# Patient Record
Sex: MALE | Race: WHITE | NOT HISPANIC OR LATINO | Employment: OTHER | URBAN - METROPOLITAN AREA
[De-identification: names, ages, dates, MRNs, and addresses within clinical notes are randomized per-mention and may not be internally consistent; named-entity substitution may affect disease eponyms.]

---

## 2017-02-24 ENCOUNTER — OFFICE VISIT (OUTPATIENT)
Dept: INTERNAL MEDICINE | Facility: CLINIC | Age: 62
End: 2017-02-24
Payer: COMMERCIAL

## 2017-02-24 VITALS
WEIGHT: 173.5 LBS | OXYGEN SATURATION: 95 % | TEMPERATURE: 98 F | HEART RATE: 79 BPM | HEIGHT: 66 IN | DIASTOLIC BLOOD PRESSURE: 72 MMHG | SYSTOLIC BLOOD PRESSURE: 118 MMHG | BODY MASS INDEX: 27.88 KG/M2

## 2017-02-24 DIAGNOSIS — C61 PROSTATE CANCER (H): ICD-10-CM

## 2017-02-24 DIAGNOSIS — Z00.00 ENCOUNTER FOR ROUTINE ADULT HEALTH EXAMINATION WITHOUT ABNORMAL FINDINGS: Primary | ICD-10-CM

## 2017-02-24 DIAGNOSIS — E78.5 HYPERLIPIDEMIA LDL GOAL <130: ICD-10-CM

## 2017-02-24 DIAGNOSIS — N52.31 ERECTILE DYSFUNCTION FOLLOWING RADICAL PROSTATECTOMY: ICD-10-CM

## 2017-02-24 DIAGNOSIS — D12.6 SERRATED ADENOMA OF COLON: ICD-10-CM

## 2017-02-24 DIAGNOSIS — I10 ESSENTIAL HYPERTENSION, BENIGN: ICD-10-CM

## 2017-02-24 DIAGNOSIS — Z12.5 SPECIAL SCREENING FOR MALIGNANT NEOPLASM OF PROSTATE: ICD-10-CM

## 2017-02-24 LAB
ANION GAP SERPL CALCULATED.3IONS-SCNC: 6 MMOL/L (ref 3–14)
BASOPHILS # BLD AUTO: 0.1 10E9/L (ref 0–0.2)
BASOPHILS NFR BLD AUTO: 1.1 %
BUN SERPL-MCNC: 14 MG/DL (ref 7–30)
CALCIUM SERPL-MCNC: 9.6 MG/DL (ref 8.5–10.1)
CHLORIDE SERPL-SCNC: 104 MMOL/L (ref 94–109)
CHOLEST SERPL-MCNC: 192 MG/DL
CO2 SERPL-SCNC: 31 MMOL/L (ref 20–32)
CREAT SERPL-MCNC: 1.01 MG/DL (ref 0.66–1.25)
DIFFERENTIAL METHOD BLD: ABNORMAL
EOSINOPHIL # BLD AUTO: 0.3 10E9/L (ref 0–0.7)
EOSINOPHIL NFR BLD AUTO: 4.7 %
ERYTHROCYTE [DISTWIDTH] IN BLOOD BY AUTOMATED COUNT: 13.6 % (ref 10–15)
GFR SERPL CREATININE-BSD FRML MDRD: 75 ML/MIN/1.7M2
GLUCOSE SERPL-MCNC: 108 MG/DL (ref 70–99)
HCT VFR BLD AUTO: 47.3 % (ref 40–53)
HDLC SERPL-MCNC: 63 MG/DL
HGB BLD-MCNC: 15.5 G/DL (ref 13.3–17.7)
LDLC SERPL CALC-MCNC: 111 MG/DL
LYMPHOCYTES # BLD AUTO: 2 10E9/L (ref 0.8–5.3)
LYMPHOCYTES NFR BLD AUTO: 32.1 %
MCH RBC QN AUTO: 33.3 PG (ref 26.5–33)
MCHC RBC AUTO-ENTMCNC: 32.8 G/DL (ref 31.5–36.5)
MCV RBC AUTO: 102 FL (ref 78–100)
MONOCYTES # BLD AUTO: 0.6 10E9/L (ref 0–1.3)
MONOCYTES NFR BLD AUTO: 9.4 %
NEUTROPHILS # BLD AUTO: 3.2 10E9/L (ref 1.6–8.3)
NEUTROPHILS NFR BLD AUTO: 52.7 %
NONHDLC SERPL-MCNC: 129 MG/DL
PLATELET # BLD AUTO: 224 10E9/L (ref 150–450)
POTASSIUM SERPL-SCNC: 4.3 MMOL/L (ref 3.4–5.3)
PSA SERPL-ACNC: 0.45 UG/L (ref 0–4)
RBC # BLD AUTO: 4.65 10E12/L (ref 4.4–5.9)
SODIUM SERPL-SCNC: 141 MMOL/L (ref 133–144)
TRIGL SERPL-MCNC: 92 MG/DL
WBC # BLD AUTO: 6.2 10E9/L (ref 4–11)

## 2017-02-24 PROCEDURE — 36415 COLL VENOUS BLD VENIPUNCTURE: CPT | Performed by: INTERNAL MEDICINE

## 2017-02-24 PROCEDURE — 80048 BASIC METABOLIC PNL TOTAL CA: CPT | Performed by: INTERNAL MEDICINE

## 2017-02-24 PROCEDURE — 85025 COMPLETE CBC W/AUTO DIFF WBC: CPT | Performed by: INTERNAL MEDICINE

## 2017-02-24 PROCEDURE — 80061 LIPID PANEL: CPT | Performed by: INTERNAL MEDICINE

## 2017-02-24 PROCEDURE — 99396 PREV VISIT EST AGE 40-64: CPT | Performed by: INTERNAL MEDICINE

## 2017-02-24 PROCEDURE — G0103 PSA SCREENING: HCPCS | Performed by: INTERNAL MEDICINE

## 2017-02-24 RX ORDER — LOSARTAN POTASSIUM AND HYDROCHLOROTHIAZIDE 25; 100 MG/1; MG/1
1 TABLET ORAL EVERY MORNING
Qty: 90 TABLET | Refills: 3 | Status: SHIPPED | OUTPATIENT
Start: 2017-02-24 | End: 2018-02-26

## 2017-02-24 RX ORDER — SILDENAFIL 100 MG/1
50-100 TABLET, FILM COATED ORAL DAILY PRN
Qty: 6 TABLET | Refills: 11 | Status: SHIPPED | OUTPATIENT
Start: 2017-02-24 | End: 2018-02-26

## 2017-02-24 NOTE — PROGRESS NOTES
SUBJECTIVE:                                                    Juancarlos Ma is a 62 year old male who presents to clinic today for the following health issues:        SUBJECTIVE:     CC: Juancarlos Ma is an 62 year old male who presents for preventative health visit.     Healthy Habits:    Do you get at least three servings of calcium containing foods daily (dairy, green leafy vegetables, etc.)? yes    Amount of exercise or daily activities, outside of work:     Problems taking medications regularly No    Medication side effects: No    Have you had an eye exam in the past two years? yes    Do you see a dentist twice per year? yes    Do you have sleep apnea, excessive snoring or daytime drowsiness?no      1.    Blood presure remains well controlled at home  Readings outside clinic are within normal limits.  Reviewed last 6 BP readings in chart:  BP Readings from Last 6 Encounters:   02/24/17 118/72   06/09/16 140/76   02/23/16 134/80   02/03/16 (!) 154/91   11/11/15 120/86   02/23/15 130/80     He has not experienced any significant side effects from medicaiotns for hypertension.    NO active cardiac complaints or symptoms with exercise.       Today's PHQ-2 Score:   PHQ-2 ( 1999 Pfizer) 2/24/2017 2/23/2016   Q1: Little interest or pleasure in doing things 0 0   Q2: Feeling down, depressed or hopeless 0 0   PHQ-2 Score 0 0       Abuse: Current or Past(Physical, Sexual or Emotional)- No  Do you feel safe in your environment - Yes    Social History   Substance Use Topics     Smoking status: Current Some Day Smoker     Packs/day: 0.50     Years: 40.00     Types: Cigarettes     Last attempt to quit: 12/1/2014     Smokeless tobacco: Never Used     Alcohol use 10.0 oz/week     20 Cans of beer per week      Comment: daily     The patient does not drink >3 drinks per day nor >7 drinks per week.    Last PSA:   PSA   Date Value Ref Range Status   02/23/2016 0.37 0 - 4 ug/L Final       Recent Labs   Lab Test  02/23/15   0817   01/22/13   1035   CHOL  184  208*   HDL  66  78   LDL  105  116   TRIG  65  70   CHOLHDLRATIO  2.8  2.7       Reviewed orders with patient. Reviewed health maintenance and updated orders accordingly - Yes    All Histories reviewed and updated in Epic.    Past Medical History:  ---------------------------  Past Medical History   Diagnosis Date     Diverticulosis of colon 10/2/12     incidental sigmoid diverticulosis seen on routine colonoscopy, no h/o diverticulitis     Essential hypertension, benign      Prostate cancer (H)      Serrated adenoma of colon 10/2/12     2 sessile serrated adenomatous polyps removed at colonoscopy; 1 tubular adenoma       Past Surgical History:  ---------------------------  Past Surgical History   Procedure Laterality Date     Hc tooth extraction w/forcep  1974     4 wisdom teeth removed without complication     Davinci prostatectomy  6/10/2013     Procedure: DAVINCI PROSTATECTOMY;  ROBOTIC ASSIST PROSTATECTOMY;  Surgeon: Damon Jarrell MD;  Location: [facility] OR       Current Medications:  ---------------------------  Current Outpatient Prescriptions   Medication Sig Dispense Refill     losartan-hydrochlorothiazide (HYZAAR) 100-25 MG per tablet Take 1 tablet by mouth every morning 90 tablet 3     sildenafil (REVATIO/VIAGRA) 100 MG cap/tab Take 0.5-1 tablets ( mg) by mouth daily as needed for erectile dysfunction Take 30 min to 4 hours before intercourse.  Never use with nitroglycerin, terazosin or doxazosin. 6 tablet 11     ASPIRIN PO Take 81 mg by mouth daily        [DISCONTINUED] losartan-hydrochlorothiazide (HYZAAR) 100-25 MG per tablet Take 1 tablet by mouth every morning 90 tablet 3       Allergies:  -------------  Allergies   Allergen Reactions     Lisinopril      Cough with Lisinopril     Penicillin [Penicillins] Hives       Social History:  -------------------  Social History     Social History     Marital status: Single     Spouse name: N/A     Number of children: N/A  "    Years of education: N/A     Occupational History      [employer]     Social History Main Topics     Smoking status: Current Some Day Smoker     Packs/day: 0.50     Years: 40.00     Types: Cigarettes     Last attempt to quit: 2014     Smokeless tobacco: Never Used     Alcohol use 10.0 oz/week     20 Cans of beer per week      Comment: daily     Drug use: No     Sexual activity: No     Other Topics Concern     Not on file     Social History Narrative       Family Medical History:  ------------------------------  Family History   Problem Relation Age of Onset     Hypertension Father       of cancer      DIABETES Brother      type II DM     CANCER Father      father  lymphoma at age 68     Respiratory Mother       at age 72 from silicosis, COPD, smoking     Family History Negative Sister      Family History Negative Brother         ROS:  C: NEGATIVE for fever, chills, change in weight  I: NEGATIVE for worrisome rashes, moles or lesions  E: NEGATIVE for vision changes or irritation  ENT: NEGATIVE for ear, mouth and throat problems  R: NEGATIVE for significant cough or SOB  CV: NEGATIVE for chest pain, palpitations or peripheral edema  GI: NEGATIVE for nausea, abdominal pain, heartburn, or change in bowel habits   male: negative for dysuria, hematuria, decreased urinary stream, erectile dysfunction, urethral discharge  M: NEGATIVE for significant arthralgias or myalgia  N: NEGATIVE for weakness, dizziness or paresthesias  P: NEGATIVE for changes in mood or affect      OBJECTIVE:     /72  Pulse 79  Temp 98  F (36.7  C) (Oral)  Ht 5' 5.75\" (1.67 m)  Wt 173 lb 8 oz (78.7 kg)  SpO2 95%  BMI 28.22 kg/m2  EXAM:  GENERAL alert and no distress.  EYES conjunctivae/corneas clear. PERRL, EOM's intact  HENT: NCAT,oral and posterior pharynx without lesions or erythema, facies symmetric  NECK: Neck supple. No LAD, without thyroidmegaly or JVD.  RESP: Clear to ausculation bilaterally " without wheezes or crackles. Normal BS in all fields.  CV: RRR normal S1S2 without  murmurs, rubs or gallops. PMI normal  LYMPH: no cervical lymph adenopathy appreciated  GI: NTND, no organomegaly, normal BS in all quadrants, without rebound or guarding  MS: No cyanosis, clubbing or edema noted bilaterally in Upper and/or Lower Extremities  SKIN: no significant ulcers, lesions or rashes on the visualized portions of the skin  NEURO: Alert and Oriented x 3, Gait normal. Reflexes normal and symmetric. Sensation grossly WNL..  PSYCH: Alert and oriented times 3; speech- coherent , normal rate and volume; able to articulate logical thoughts, able to abstract reason, no tangential thoughts, no hallucinations or delusions, affect- normal     ASSESSMENT/PLAN:       (Z00.00) Encounter for routine adult health examination without abnormal findings  (primary encounter diagnosis)  Comment: Discussed cardiac disease risk factor modification including screening for and treating HTN, lipids, DM, and smoking cessation.  Also discussed age appropriate cancer screening recommendations including testicular, prostate, colon and lung cancer as dictated by age group.  Recommended low fat, low salt diet and moderation in any alcohol intake.  Recommended always using seatbelts when in a car.  Recommended never driving after drinking or riding with someone who has been drinking as well.       Plan:     (I10) Essential hypertension, benign  Comment: This condition is currently controlled on the current medical regimen.  Continue current therapy.   Discussed hypertension in detail including JNC VIII guidelines for blood pressure goals.  Discussed indication for treatment and treatment options.  Discussed the importance for aggressive management of HTN to prevent vascular complications later.  Recommended lower fat, lower carbohydrate, and lower sodium (<2000 mg)diet.  Discussed required intervals for follow up on HTN, lab studies, and the  need to aggresive management of other cardiac disease risk factors.  Recommened pt. follow their blood pressures outside the clinic to ensure that BPs are remaining within guidelines, and to contact me if the readings are not within guidelines so we can adjust treatment as needed.   Plan: losartan-hydrochlorothiazide (HYZAAR) 100-25 MG        per tablet, CBC with platelets and         differential, Basic metabolic panel            (E78.5) Hyperlipidemia LDL goal <130  Comment: Discussed new guidelines recommending a statin cholesterol lowering medication for any patient with either diabetes and/or vascular disease, aiming for a LDL goal under 100 for sure, ideally under 70.    Reviewed statins and their side effects including muscle pain, muscle inflammation, GI upset.  Told the patient to stop the medication in question and to call if any side effects develop.   Recommended CoQ10 200-300 mg at the same time as taking the statin medication to help reduce the chance of muscle side effects from the statin.    Plan: Lipid Profile with reflex to direct LDL            (D12.6) Serrated adenoma of colon  Comment: next due 2021  Plan:     (C61) Prostate cancer (H)  Comment: doing well, urologist wants me to check PSA annually, return to urology as indiacted.   Plan:     (N52.31) Erectile dysfunction following radical prostatectomy  Comment: ED since prostataectomy.  This diagnosis could qualify for coverage for Viagra, he would like to try again.   If this does not worek, then he may need to return to urology to discuss other options.   Plan: sildenafil (REVATIO/VIAGRA) 100 MG cap/tab            (Z12.5) Special screening for malignant neoplasm of prostate  Comment:   Plan: PSA, screen               COUNSELING:  Reviewed preventive health counseling, as reflected in patient instructions       Regular exercise       Healthy diet/nutrition       Vision screening       Hearing screening       Aspirin Prophylaxsis       Colon  "cancer screening       Prostate cancer screening         reports that he has been smoking Cigarettes.  He has a 20.00 pack-year smoking history. He has never used smokeless tobacco.    Estimated body mass index is 28.22 kg/(m^2) as calculated from the following:    Height as of this encounter: 5' 5.75\" (1.67 m).    Weight as of this encounter: 173 lb 8 oz (78.7 kg).       Counseling Resources:  ATP IV Guidelines  Pooled Cohorts Equation Calculator  FRAX Risk Assessment  ICSI Preventive Guidelines  Dietary Guidelines for Americans, 2010  USDA's MyPlate  ASA Prophylaxis  Lung CA Screening    Wili Arevalo MD  [facility]  "

## 2017-02-24 NOTE — MR AVS SNAPSHOT
After Visit Summary   2/24/2017    Juancarlos Ma    MRN: [id number]           Patient Information     Date Of Birth          1955        Visit Information        Provider Department      2/24/2017 8:20 AM Wili Arevalo MD [facility]        Today's Diagnoses     Encounter for routine adult health examination without abnormal findings    -  1    Essential hypertension, benign        Hyperlipidemia LDL goal <130        Serrated adenoma of colon        Prostate cancer (H)        Erectile dysfunction following radical prostatectomy          Care Instructions    *  Continue all medications at the same doses.  Contact your usual pharmacy if you need refills.     *  Colonoscopy next due 2021      VIAGRA (SILDENAFIL):     *  Trial of Sildenafil (Viagra) for help with erections.      *  Brand name Viagra is Sildenafil that comes in a 25, 50, or 100 mg size tablet that is manufactured and marketed by the drug company Pfizer.    *  Sildenafil is also available as a generic medication that comes in a 20 or 40 mg tablet.  It is not technically called Viagra, but is the same medication.      *  Visit the Viagra web site (http://www.Dojo/) for more information and also for possible discount cards.      *  Viagra and generic Sildenafil are potentially available at much cheaper prices from some [facility], which can be very important if you are paying for the cost of this medication.  One example is www.RingTu.Transcarga.pe    *  Depending on which version of Sildenafil you have, Take a 20 mg, 40 mg or 50 mg dose (1/2 of 100 mg tablet) 30-60 minutes before sexual intercourse.  If the first time lower dose does not work, then retry the same dose the next time.  If that dose dose not work, then try 100 mg dose.    The medication rarely works the very first time you try it since you are likely to be nervous about the medication itself, but there has to be a first time.   "    *  Beware of possible side effects including dizziness, headaches, colosr vision, upset stomach, nausea, passing out, problems with urination and sustained erections.  Never take the medication with nitroglycerin containing medications.  If you experience any severe side effects stop the medication and do not take it again until you speak with our clinic.     *  There may be an increased rate of side effects from Sildenafil/Viagra when alcohol is used too close to viagra    *  I will prescribe either Sildenafil or Viagra as long as it helps improve erectile dysfunction and does not cause side effects.  Contact me after you finish the first prescription.  If it works well with no major side effects, then I will give you further prescriptions.  If Sildenafil does not work as desired, then we may consider another similar medication (Levitra or Cialis)    *  Contact me after you use the first prescripton and let me know if it works and that there are no side effects.   If it works and there are no side effects, then I will continue it.  Also let me know what form of Sildenafil you would like to continue and where you would like to get the prescriptions.          5 GOALS TO PREVENT VASCULAR DISEASE:     1.  Aggressive blood pressure control, under 130/80 ideally.  Using medications if needed.    Your blood pressure is under good control    BP Readings from Last 4 Encounters:   02/24/17 118/72   06/09/16 140/76   02/23/16 134/80   02/03/16 (!) 154/91       2.  Aggressive LDL cholesterol (\"bad cholesterol\") lowering as indicated.    Your goal is an LDL under 130 for sure, preferably under 100.  (If you have diabetes or previous vascular disease, the the LDL goals would be under 100 for sure, preferably under 70.)    New guidelines identify four high-risk groups who could benefit from statins:   *people with pre-existing heart disease, such as those who have had a heart attack;   *people ages 40 to 75 who have diabetes " "of any type  *patients ages 40 to 75 with at least a 7.5% risk of developing cardiovascular disease over the next decade, according to a formula described in the guidelines  *patients with the sort of super-high cholesterol that sometimes runs in families, as evidenced by an LDL of 190 milligrams per deciliter or higher    Your cholesterol levels are well controlled.    Recent Labs   Lab Test  02/23/15   0831  01/22/13   1035   CHOL  184  208*   HDL  66  78   LDL  105  116   TRIG  65  70   CHOLHDLRATIO  2.8  2.7       3.  Aggressive diabetic prevention, screening and/or management.      You do not have diabetes as of the most recent blood tests.     4.  No smoking    5.  Consider taking low dose aspirin (81 mg) tablet once per day over the age of 50, every day unless there is a specific reason that you cannot take aspirin (such as side effect, allergy, or you are on another \"blood thinner\").        --Based on your current cardiac risk factors, you should take Aspirin 81 mg once per day if you are over 50 years of age.           Preventive Health Recommendations  Male Ages 50 - 64    Yearly exam:             See your health care provider every year in order to  o   Review health changes.   o   Discuss preventive care.    o   Review your medicines if your doctor has prescribed any.     Have a cholesterol test every 5 years, or more frequently if you are at risk for high cholesterol/heart disease.     Have a diabetes test (fasting glucose) every three years. If you are at risk for diabetes, you should have this test more often.     Have a colonoscopy at age 50, or have a yearly FIT test (stool test). These exams will check for colon cancer.      Talk with your health care provider about whether or not a prostate cancer screening test (PSA) is right for you.    You should be tested each year for STDs (sexually transmitted diseases), if you re at risk.     Shots: Get a flu shot each year. Get a tetanus shot every 10 " "years.     Nutrition:    Eat at least 5 servings of fruits and vegetables daily.     Eat whole-grain bread, whole-wheat pasta and brown rice instead of white grains and rice.     Talk to your provider about Calcium and Vitamin D.        --Good Grains:  Oats, brown rice, Quinoa (these do not raise the blood sugar as much)     --Bad grains:  Anything made from wheat or white rice     (because these raise the blood sugars significantly, and the possible gluten issue from wheat for some people).      --Proteins:  Aim for \"lean proteins\" including chicken, fish, seafood, pork, turkey, and eggs (in moderation); Eat red meat only occasionally          Lifestyle    Exercise for at least 150 minutes a week (30 minutes a day, 5 days a week). This will help you control your weight and prevent disease.     Limit alcohol to one drink per day.     No smoking.     Wear sunscreen to prevent skin cancer.     See your dentist every six months for an exam and cleaning.     See your eye doctor every 1 to 2 years.          Follow-ups after your visit        Who to contact     If you have questions or need follow up information about today's clinic visit or your schedule please contact [facility] directly at [phone].  Normal or non-critical lab and imaging results will be communicated to you by [employer], letter or phone within 4 business days after the clinic has received the results. If you do not hear from us within 7 days, please contact the clinic through [employer] or phone. If you have a critical or abnormal lab result, we will notify you by phone as soon as possible.  Submit refill requests through [employer] or call your pharmacy and they will forward the refill request to us. Please allow 3 business days for your refill to be completed.          Additional Information About Your Visit        Badge Information     Badge gives you secure access to your electronic health record. If you see a primary care " "provider, you can also send messages to your care team and make appointments. If you have questions, please call your primary care clinic.  If you do not have a primary care provider, please call [phone] and they will assist you.        Care EveryWhere ID     This is your Care EveryWhere ID. This could be used by other organizations to access your [facility] medical records  [id number]        Your Vitals Were     Pulse Temperature Height Pulse Oximetry BMI (Body Mass Index)       79 98  F (36.7  C) (Oral) 5' 5.75\" (1.67 m) 95% 28.22 kg/m2        Blood Pressure from Last 3 Encounters:   02/24/17 118/72   06/09/16 140/76   02/23/16 134/80    Weight from Last 3 Encounters:   02/24/17 173 lb 8 oz (78.7 kg)   06/09/16 178 lb (80.7 kg)   02/23/16 170 lb 11.2 oz (77.4 kg)              Today, you had the following     No orders found for display         Today's Medication Changes          These changes are accurate as of: 2/24/17  9:11 AM.  If you have any questions, ask your nurse or doctor.               Start taking these medicines.        Dose/Directions    sildenafil 100 MG cap/tab   Commonly known as:  REVATIO/VIAGRA   Used for:  Erectile dysfunction following radical prostatectomy   Started by:  Wili Arevalo MD        Dose:   mg   Take 0.5-1 tablets ( mg) by mouth daily as needed for erectile dysfunction Take 30 min to 4 hours before intercourse.  Never use with nitroglycerin, terazosin or doxazosin.   Quantity:  6 tablet   Refills:  11            Where to get your medicines      These medications were sent to [facility] [address] [place] [address] [address] [facility] [address] [facility]  [address] [facility] [facility] [address]     Phone:  [phone]     losartan-hydrochlorothiazide 100-25 MG per tablet         Some of these will need a paper prescription and others can be bought over the counter.  Ask your nurse if you have questions.     Bring a paper prescription for each of these " medications     sildenafil 100 MG cap/tab                Primary Care Provider Office Phone # Fax #    Wili Arevalo [phone]       [facility] [address] [facility] [address]        Thank you!     Thank you for choosing [facility]  for your care. Our goal is always to provide you with excellent care. Hearing back from our patients is one way we can continue to improve our services. Please take a few minutes to complete the written survey that you may receive in the mail after your visit with us. Thank you!             Your Updated Medication List - Protect others around you: Learn how to safely use, store and throw away your medicines at www.disposemymeds.org.          This list is accurate as of: 2/24/17  9:11 AM.  Always use your most recent med list.                   Brand Name Dispense Instructions for use    ASPIRIN PO      Take 81 mg by mouth daily       losartan-hydrochlorothiazide 100-25 MG per tablet    HYZAAR    90 tablet    Take 1 tablet by mouth every morning       sildenafil 100 MG cap/tab    REVATIO/VIAGRA    6 tablet    Take 0.5-1 tablets ( mg) by mouth daily as needed for erectile dysfunction Take 30 min to 4 hours before intercourse.  Never use with nitroglycerin, terazosin or doxazosin.

## 2017-02-24 NOTE — NURSING NOTE
"Chief Complaint   Patient presents with     Hypertension     med refill       Initial /72  Pulse 79  Temp 98  F (36.7  C) (Oral)  Ht 5' 5.75\" (1.67 m)  Wt 173 lb 8 oz (78.7 kg)  SpO2 95%  BMI 28.22 kg/m2 Estimated body mass index is 28.22 kg/(m^2) as calculated from the following:    Height as of this encounter: 5' 5.75\" (1.67 m).    Weight as of this encounter: 173 lb 8 oz (78.7 kg).  Medication Reconciliation: complete   Nicole Walden CMA    "

## 2017-11-08 ENCOUNTER — TRANSFERRED RECORDS (OUTPATIENT)
Dept: HEALTH INFORMATION MANAGEMENT | Facility: CLINIC | Age: 62
End: 2017-11-08

## 2018-02-07 ENCOUNTER — TRANSFERRED RECORDS (OUTPATIENT)
Dept: HEALTH INFORMATION MANAGEMENT | Facility: CLINIC | Age: 63
End: 2018-02-07

## 2018-02-26 ENCOUNTER — OFFICE VISIT (OUTPATIENT)
Dept: INTERNAL MEDICINE | Facility: CLINIC | Age: 63
End: 2018-02-26
Payer: COMMERCIAL

## 2018-02-26 VITALS
WEIGHT: 173 LBS | RESPIRATION RATE: 17 BRPM | HEART RATE: 83 BPM | SYSTOLIC BLOOD PRESSURE: 116 MMHG | OXYGEN SATURATION: 93 % | TEMPERATURE: 98 F | DIASTOLIC BLOOD PRESSURE: 74 MMHG | HEIGHT: 66 IN | BODY MASS INDEX: 27.8 KG/M2

## 2018-02-26 DIAGNOSIS — Z12.5 SPECIAL SCREENING FOR MALIGNANT NEOPLASM OF PROSTATE: ICD-10-CM

## 2018-02-26 DIAGNOSIS — Z01.818 PREOPERATIVE EXAMINATION: ICD-10-CM

## 2018-02-26 DIAGNOSIS — G56.02 CARPAL TUNNEL SYNDROME OF LEFT WRIST: ICD-10-CM

## 2018-02-26 DIAGNOSIS — E78.5 HYPERLIPIDEMIA LDL GOAL <130: ICD-10-CM

## 2018-02-26 DIAGNOSIS — I10 ESSENTIAL HYPERTENSION, BENIGN: Primary | ICD-10-CM

## 2018-02-26 DIAGNOSIS — C61 PROSTATE CANCER (H): ICD-10-CM

## 2018-02-26 DIAGNOSIS — S62.347D CLOSED NONDISPLACED FRACTURE OF BASE OF FIFTH METACARPAL BONE OF LEFT HAND WITH ROUTINE HEALING, SUBSEQUENT ENCOUNTER: ICD-10-CM

## 2018-02-26 DIAGNOSIS — Z11.59 NEED FOR HEPATITIS C SCREENING TEST: ICD-10-CM

## 2018-02-26 PROBLEM — S62.307A FRACTURE OF FIFTH METACARPAL BONE OF LEFT HAND: Status: ACTIVE | Noted: 2017-11-07

## 2018-02-26 LAB
ANION GAP SERPL CALCULATED.3IONS-SCNC: 5 MMOL/L (ref 3–14)
BUN SERPL-MCNC: 11 MG/DL (ref 7–30)
CALCIUM SERPL-MCNC: 9.5 MG/DL (ref 8.5–10.1)
CHLORIDE SERPL-SCNC: 103 MMOL/L (ref 94–109)
CHOLEST SERPL-MCNC: 205 MG/DL
CO2 SERPL-SCNC: 28 MMOL/L (ref 20–32)
CREAT SERPL-MCNC: 0.88 MG/DL (ref 0.66–1.25)
ERYTHROCYTE [DISTWIDTH] IN BLOOD BY AUTOMATED COUNT: 12.2 % (ref 10–15)
GFR SERPL CREATININE-BSD FRML MDRD: 88 ML/MIN/1.7M2
GLUCOSE SERPL-MCNC: 110 MG/DL (ref 70–99)
HCT VFR BLD AUTO: 46.8 % (ref 40–53)
HCV AB SERPL QL IA: NONREACTIVE
HDLC SERPL-MCNC: 69 MG/DL
HGB BLD-MCNC: 15.5 G/DL (ref 13.3–17.7)
LDLC SERPL CALC-MCNC: 121 MG/DL
MCH RBC QN AUTO: 33.6 PG (ref 26.5–33)
MCHC RBC AUTO-ENTMCNC: 33.1 G/DL (ref 31.5–36.5)
MCV RBC AUTO: 102 FL (ref 78–100)
NONHDLC SERPL-MCNC: 136 MG/DL
PLATELET # BLD AUTO: 212 10E9/L (ref 150–450)
POTASSIUM SERPL-SCNC: 4.2 MMOL/L (ref 3.4–5.3)
PSA SERPL-ACNC: 0.52 UG/L (ref 0–4)
RBC # BLD AUTO: 4.61 10E12/L (ref 4.4–5.9)
SODIUM SERPL-SCNC: 136 MMOL/L (ref 133–144)
TRIGL SERPL-MCNC: 74 MG/DL
WBC # BLD AUTO: 5.6 10E9/L (ref 4–11)

## 2018-02-26 PROCEDURE — 99215 OFFICE O/P EST HI 40 MIN: CPT | Performed by: INTERNAL MEDICINE

## 2018-02-26 PROCEDURE — 86803 HEPATITIS C AB TEST: CPT | Performed by: INTERNAL MEDICINE

## 2018-02-26 PROCEDURE — 85027 COMPLETE CBC AUTOMATED: CPT | Performed by: INTERNAL MEDICINE

## 2018-02-26 PROCEDURE — G0103 PSA SCREENING: HCPCS | Performed by: INTERNAL MEDICINE

## 2018-02-26 PROCEDURE — 80048 BASIC METABOLIC PNL TOTAL CA: CPT | Performed by: INTERNAL MEDICINE

## 2018-02-26 PROCEDURE — 80061 LIPID PANEL: CPT | Performed by: INTERNAL MEDICINE

## 2018-02-26 PROCEDURE — 36415 COLL VENOUS BLD VENIPUNCTURE: CPT | Performed by: INTERNAL MEDICINE

## 2018-02-26 RX ORDER — LOSARTAN POTASSIUM AND HYDROCHLOROTHIAZIDE 25; 100 MG/1; MG/1
1 TABLET ORAL EVERY MORNING
Qty: 90 TABLET | Refills: 3 | Status: SHIPPED | OUTPATIENT
Start: 2018-02-26 | End: 2018-12-28

## 2018-02-26 NOTE — PATIENT INSTRUCTIONS
"*  Contact us with the date, time, place of any future surgery.  I can complete a pre-op exam based on the information today.     *  Continue all medications at the same doses.  Contact your usual pharmacy if you need refills.     *  Return to see me in 1 year, sooner if needed.  Call [phone] to schedule this appointment.       SHINGLES VACCINE:     I would recommend that you consider getting a \"shingles vaccine\".  The shingles vaccine does not stop you from getting shingles, but it decreases the intensity of the event, the duration of the event, and decreases the painful nerve condition that results     There are two options available:     --Shingrix (approved late 2017), 2 shots, 2-6 months apart    OR   --Zostavax, one shot    --Based on the available data, the Shingrix vaccine has superior benefit and should be considered even if you have had the Zostavax vaccine before.      --Contact your insurance provider and ask them if either shingles vaccine is covered and is so, how much it will cost you.  Usually this will be cheaper to get in a pharmacy given by the pharmacist.    --Regardless of the coverage, I would recommend that you consider the vaccine since shingles is very painful, just ask anyone who has ever had it.                   5 GOALS TO PREVENT VASCULAR DISEASE:     1.  Aggressive blood pressure control, under 130/80 ideally.  Using medications if needed.    Your blood pressure is under good control    BP Readings from Last 4 Encounters:   02/26/18 116/74   02/24/17 118/72   06/09/16 140/76   02/23/16 134/80       2.  Aggressive LDL cholesterol (\"bad cholesterol\") lowering as indicated.    Your goal is an LDL under 130 for sure, preferably under 100.  (If you have diabetes or previous vascular disease, the the LDL goals would be under 100 for sure, preferably under 70.)    New guidelines identify four high-risk groups who could benefit from statins:   *people with pre-existing heart disease, such " "as those who have had a heart attack;   *people ages 40 to 75 who have diabetes of any type  *patients ages 40 to 75 with at least a 7.5% risk of developing cardiovascular disease over the next decade, according to a formula described in the guidelines  *patients with the sort of super-high cholesterol that sometimes runs in families, as evidenced by an LDL of 190 milligrams per deciliter or higher    Your cholesterol levels are well controlled.    Recent Labs   Lab Test  02/24/17   0919  02/23/15   0831  01/22/13   1035   CHOL  192  184  208*   HDL  63  66  78   LDL  111*  105  116   TRIG  92  65  70   CHOLHDLRATIO   --   2.8  2.7       3.  Aggressive diabetic prevention, screening and/or management.      You do not have diabetes as of the most recent blood tests.     4.  No smoking    5.  Consider taking low dose aspirin (81 mg) tablet once per day over the age of 50, every day unless there is a specific reason that you cannot take aspirin (such as side effect, allergy, or you are on another \"blood thinner\").        --Based on your current cardiac risk factors, you should take Aspirin 81 mg once per day if you are over 50 years of age.           PRE-OPERATIVE INSTRUCTIONS:     *  No aspirin or NSAIDs (Mortin, advil ibuprofen, aleve, etc) within 7 days of surgery.    *  Tylenol (acetaminophen) OK to take if needed for pain or headache.  Follow instructions on the bottle    *  Stop any Fish Oil or vitamin E supplements for 7 days prior to surgery because these can affect platelet function.      *  ON THE MORNING OF SURGERY:     --Do NOT take the Losartan on the morning of surgery.      *  Resume all medications at the same doses after surgery, unless instructed otherwise by the medical staff.     *  Attend all follow up appointments with the surgeons (and/or therapists if applicable) as instructed.     *  Contact the surgeon's office for any specific questions about after-surgery cares and follow up instructions. "

## 2018-02-26 NOTE — MR AVS SNAPSHOT
"              After Visit Summary   2/26/2018    Juancarlos Ma    MRN: [id number]           Patient Information     Date Of Birth          1955        Visit Information        Provider Department      2/26/2018 7:20 AM Wili Arevalo MD [facility]        Today's Diagnoses     Essential hypertension, benign    -  1    Closed nondisplaced fracture of base of fifth metacarpal bone of left hand with routine healing, subsequent encounter        Preoperative examination        Prostate cancer (H)        Hyperlipidemia LDL goal <130        Carpal tunnel syndrome of left wrist        Special screening for malignant neoplasm of prostate        Need for hepatitis C screening test          Care Instructions    *  Contact us with the date, time, place of any future surgery.  I can complete a pre-op exam based on the information today.     *  Continue all medications at the same doses.  Contact your usual pharmacy if you need refills.     *  Return to see me in 1 year, sooner if needed.  Call [phone] to schedule this appointment.       SHINGLES VACCINE:     I would recommend that you consider getting a \"shingles vaccine\".  The shingles vaccine does not stop you from getting shingles, but it decreases the intensity of the event, the duration of the event, and decreases the painful nerve condition that results     There are two options available:     --Shingrix (approved late 2017), 2 shots, 2-6 months apart    OR   --Zostavax, one shot    --Based on the available data, the Shingrix vaccine has superior benefit and should be considered even if you have had the Zostavax vaccine before.      --Contact your insurance provider and ask them if either shingles vaccine is covered and is so, how much it will cost you.  Usually this will be cheaper to get in a pharmacy given by the pharmacist.    --Regardless of the coverage, I would recommend that you consider the vaccine since shingles is very " "painful, just ask anyone who has ever had it.                   5 GOALS TO PREVENT VASCULAR DISEASE:     1.  Aggressive blood pressure control, under 130/80 ideally.  Using medications if needed.    Your blood pressure is under good control    BP Readings from Last 4 Encounters:   02/26/18 116/74   02/24/17 118/72   06/09/16 140/76   02/23/16 134/80       2.  Aggressive LDL cholesterol (\"bad cholesterol\") lowering as indicated.    Your goal is an LDL under 130 for sure, preferably under 100.  (If you have diabetes or previous vascular disease, the the LDL goals would be under 100 for sure, preferably under 70.)    New guidelines identify four high-risk groups who could benefit from statins:   *people with pre-existing heart disease, such as those who have had a heart attack;   *people ages 40 to 75 who have diabetes of any type  *patients ages 40 to 75 with at least a 7.5% risk of developing cardiovascular disease over the next decade, according to a formula described in the guidelines  *patients with the sort of super-high cholesterol that sometimes runs in families, as evidenced by an LDL of 190 milligrams per deciliter or higher    Your cholesterol levels are well controlled.    Recent Labs   Lab Test  02/24/17   0919  02/23/15   0831  01/22/13   1035   CHOL  192  184  208*   HDL  63  66  78   LDL  111*  105  116   TRIG  92  65  70   CHOLHDLRATIO   --   2.8  2.7       3.  Aggressive diabetic prevention, screening and/or management.      You do not have diabetes as of the most recent blood tests.     4.  No smoking    5.  Consider taking low dose aspirin (81 mg) tablet once per day over the age of 50, every day unless there is a specific reason that you cannot take aspirin (such as side effect, allergy, or you are on another \"blood thinner\").        --Based on your current cardiac risk factors, you should take Aspirin 81 mg once per day if you are over 50 years of age.           PRE-OPERATIVE INSTRUCTIONS:     *  " No aspirin or NSAIDs (Mortin, advil ibuprofen, aleve, etc) within 7 days of surgery.    *  Tylenol (acetaminophen) OK to take if needed for pain or headache.  Follow instructions on the bottle    *  Stop any Fish Oil or vitamin E supplements for 7 days prior to surgery because these can affect platelet function.      *  ON THE MORNING OF SURGERY:     --Do NOT take the Losartan on the morning of surgery.      *  Resume all medications at the same doses after surgery, unless instructed otherwise by the medical staff.     *  Attend all follow up appointments with the surgeons (and/or therapists if applicable) as instructed.     *  Contact the surgeon's office for any specific questions about after-surgery cares and follow up instructions.                              Follow-ups after your visit        Follow-up notes from your care team     Return in about 1 year (around 2/26/2019).      Who to contact     If you have questions or need follow up information about today's clinic visit or your schedule please contact [facility] directly at [phone].  Normal or non-critical lab and imaging results will be communicated to you by [employer], letter or phone within 4 business days after the clinic has received the results. If you do not hear from us within 7 days, please contact the clinic through [employer] or phone. If you have a critical or abnormal lab result, we will notify you by phone as soon as possible.  Submit refill requests through [employer] or call your pharmacy and they will forward the refill request to us. Please allow 3 business days for your refill to be completed.          Additional Information About Your Visit        [employer] Information     [employer] gives you secure access to your electronic health record. If you see a primary care provider, you can also send messages to your care team and make appointments. If you have questions, please call your primary care clinic.  If you do not  "have a primary care provider, please call [phone] and they will assist you.        Care EveryWhere ID     This is your Care EveryWhere ID. This could be used by other organizations to access your [facility] medical records  [id number]        Your Vitals Were     Pulse Temperature Respirations Height Pulse Oximetry BMI (Body Mass Index)    83 98  F (36.7  C) (Oral) 17 5' 5.75\" (1.67 m) 93% 28.14 kg/m2       Blood Pressure from Last 3 Encounters:   02/26/18 116/74   02/24/17 118/72   06/09/16 140/76    Weight from Last 3 Encounters:   02/26/18 173 lb (78.5 kg)   02/24/17 173 lb 8 oz (78.7 kg)   06/09/16 178 lb (80.7 kg)              We Performed the Following     Basic metabolic panel     CBC with platelets     Hepatitis C antibody     Lipid panel reflex to direct LDL Fasting     PSA, screen          Where to get your medicines      These medications were sent to [facility] [address] [place], [place] [address] [address] [address]     Phone:  [phone]     losartan-hydrochlorothiazide 100-25 MG per tablet          Primary Care Provider Office Phone # Fax #    Wili Arevalo [phone]       [address] [facility] [address]        Equal Access to Services     NETO MCGHEE : Hadii aad ku hadasho Soomaali, waaxda luqadaha, qaybta kaalmada adeegyada, camille upton. So [facility] [phone].    ATENCIÓN: Si habla español, tiene a dwyer disposición servicios gratuitos de asistencia lingüística. Rosa al [phone].    We comply with applicable federal civil rights laws and [place] laws. We do not discriminate on the basis of race, color, national origin, age, disability, sex, sexual orientation, or gender identity.            Thank you!     Thank you for choosing [facility]  for your care. Our goal is always to provide you with excellent care. Hearing back from our patients is one way we can " continue to improve our services. Please take a few minutes to complete the written survey that you may receive in the mail after your visit with us. Thank you!             Your Updated Medication List - Protect others around you: Learn how to safely use, store and throw away your medicines at www.disposemymeds.org.          This list is accurate as of 2/26/18  8:01 AM.  Always use your most recent med list.                   Brand Name Dispense Instructions for use Diagnosis    ASPIRIN PO      Take 81 mg by mouth daily        losartan-hydrochlorothiazide 100-25 MG per tablet    HYZAAR    90 tablet    Take 1 tablet by mouth every morning    Essential hypertension, benign

## 2018-02-26 NOTE — PROGRESS NOTES
SUBJECTIVE:   Juancarlos Ma is a 63 year old male who presents to clinic today for the following health issues:      Hypertension Follow-up      Outpatient blood pressures are not being checked.    Low Salt Diet: not monitoring salt    Blood presure remains well controlled at home  Readings outside clinic are within normal limits.  Reviewed last 6 BP readings in chart:  BP Readings from Last 6 Encounters:   02/26/18 116/74   02/24/17 118/72   06/09/16 140/76   02/23/16 134/80   02/03/16 (!) 154/91   11/11/15 120/86     He has not experienced any significant side effects from medicaiotns for hypertension.    NO active cardiac complaints or symptoms with exercise.       Amount of exercise or physical activity: at work    Problems taking medications regularly: No    Medication side effects: none    Diet: regular (no restrictions)        Joint or Musculoskeletal Pain  Duration of complaint: would like to discuss L hand pain since 11/2017. Has seen ortho with out any resolve.     HAS BEEN TOLD THAT HE MAY NEED SURGERY, BUT NOTHING PLANNED AT THIS TIME.         *  No recent infectious illnesses.    *  No recent cardiac or pulmonary issues or symptoms.    *  No problems performing vigorous physical activity, no changes in exercise tolerance.    *  No personal or family history of anesthesia complications.    *  No personal or family history of bleeding or clotting disorders.           Problem list and histories reviewed & adjusted, as indicated.  Additional history: as documented        Reviewed and updated as needed this visit by clinical staff  Tobacco  Allergies  Meds  Soc Hx      Reviewed and updated as needed this visit by Provider           Past Medical History:  ---------------------------  Past Medical History:   Diagnosis Date     Diverticulosis of colon 10/2/12    incidental sigmoid diverticulosis seen on routine colonoscopy, no h/o diverticulitis     Essential hypertension, benign      Prostate cancer (H)       Serrated adenoma of colon 10/2/12    2 sessile serrated adenomatous polyps removed at colonoscopy; 1 tubular adenoma       Past Surgical History:  ---------------------------  Past Surgical History:   Procedure Laterality Date     DAVINCI PROSTATECTOMY  6/10/2013    Procedure: DAVINCI PROSTATECTOMY;  ROBOTIC ASSIST PROSTATECTOMY;  Surgeon: Damon Jarrell MD;  Location:  OR      TOOTH EXTRACTION W/FORCE  1974    4 wisdom teeth removed without complication       Current Medications:  ---------------------------  Current Outpatient Prescriptions   Medication Sig Dispense Refill     losartan-hydrochlorothiazide (HYZAAR) 100-25 MG per tablet Take 1 tablet by mouth every morning 90 tablet 3     ASPIRIN PO Take 81 mg by mouth daily        sildenafil (REVATIO/VIAGRA) 100 MG cap/tab Take 0.5-1 tablets ( mg) by mouth daily as needed for erectile dysfunction Take 30 min to 4 hours before intercourse.  Never use with nitroglycerin, terazosin or doxazosin. (Patient not taking: Reported on 2/26/2018) 6 tablet 11       Allergies:  -------------  Allergies   Allergen Reactions     Lisinopril      Cough with Lisinopril     Penicillin [Penicillins] Hives       Social History:  -------------------  Social History     Social History     Marital status: Single     Spouse name: N/A     Number of children: N/A     Years of education: N/A     Occupational History      [employer]     Social History Main Topics     Smoking status: Former Smoker     Packs/day: 0.50     Years: 40.00     Types: Cigarettes     Quit date: 12/1/2014     Smokeless tobacco: Never Used      Comment: quit fall of 2017     Alcohol use 10.0 oz/week     20 Cans of beer per week      Comment: daily     Drug use: No     Sexual activity: No     Other Topics Concern     Not on file     Social History Narrative       Family Medical History:  ------------------------------  Family History   Problem Relation Age of Onset     Hypertension  "Father       of cancer      DIABETES Brother      type II DM     CANCER Father      father  lymphoma at age 68     Respiratory Mother       at age 72 from silicosis, COPD, smoking     Family History Negative Sister      Family History Negative Brother          ROS:  REVIEW OF SYSTEMS:    RESP: negative for cough, dyspnea, wheezing, hemoptysis  CV: negative for chest pain, palpitations, PND, DENTON, orthopnea; reports no changes in their ability to perform physical activity (from cardiovascular standpoint)  GI: negative for dysphagia, N/V, pain, melena, diarrhea and constipation  NEURO: negative for numbness/tingling, paralysis, incoordination, or focal weakness     OBJECTIVE:                                                    /74  Pulse 83  Temp 98  F (36.7  C) (Oral)  Resp 17  Ht 5' 5.75\" (1.67 m)  Wt 173 lb (78.5 kg)  SpO2 93%  BMI 28.14 kg/m2     GENERAL alert and no distress  EYES:  Normal sclera,conjunctiva, EOMI  HENT: oral and posterior pharynx without lesions or erythema, facies symmetric  NECK: Neck supple. No LAD, without thyroidmegaly or JVD., Carotids without bruits.  RESP: Clear to ausculation bilaterally without wheezes or crackles. Normal BS in all fields.  CV: RRR normal S1S2 without murmurs, rubs or gallops. PMI normal  LYMPH: no cervical lymph adenopathy appreciated  MS: extremities- no gross deformities of the visible extremities noted, no edema  PSYCH: Alert and oriented times 3; speech- coherent  SKIN:  No obvious significant skin lesions on visible portions of face          ASSESSMENT/PLAN:                                                      (I10) Essential hypertension, benign  (primary encounter diagnosis)  Comment: This condition is currently controlled on the current medical regimen.  Continue current therapy.   Rkrystina   Plan: losartan-hydrochlorothiazide (HYZAAR) 100-25 MG        per tablet, CBC with platelets, Basic metabolic        panel            (S62.480D) " Closed nondisplaced fracture of base of fifth metacarpal bone of left hand with routine healing, subsequent encounter  Comment: This condition is currently controlled on the current medical regimen.  Continue current therapy.   Plan:     (Z01.818) Preoperative examination  Comment: seeing other about the metacarpal fracture that does not appear to be healing normally  Was told he may need surgery for this.     If there is a surgery scheduled in the next couple of months, he does not need to return for preop exam.  I can clear him for the procedure based on this exam.     Approval given to proceed with proposed procedure, without further diagnostic evaluation.  Discontinue ASA 7 days prior to procedure to reduce bleeding risk.  Aspirin held for 7 days prior to scheduled surgery & will be resumed post-op  Discontinue NSAIDS 5 days prior to procedure to reduce bleeding risk.  Pre and Post-op Beta blockers for maximum CV risk reduction:  Beta blockers not indicated for this procedure  On the morning of surgery, DO NOT TAKE LOSARTAN   Resume all medications post-operatively at the same doses.     Plan:     (C61) Prostate cancer (H)  Comment:   Plan: PSA, screen            (E78.5) Hyperlipidemia LDL goal <130  Comment:   Plan: Lipid panel reflex to direct LDL Fasting            (G56.02) Carpal tunnel syndrome of left wrist  Comment:   Plan:     (Z12.5) Special screening for malignant neoplasm of prostate  Comment:   Plan: PSA, screen            (Z11.59) Need for hepatitis C screening test  Comment:   Plan: Hepatitis C antibody                 See Patient Instructions    ENID AVILA M.D., MD  [facility]

## 2018-12-11 ENCOUNTER — ANCILLARY PROCEDURE (OUTPATIENT)
Dept: GENERAL RADIOLOGY | Facility: CLINIC | Age: 63
End: 2018-12-11
Attending: PHYSICIAN ASSISTANT
Payer: COMMERCIAL

## 2018-12-11 ENCOUNTER — OFFICE VISIT (OUTPATIENT)
Dept: INTERNAL MEDICINE | Facility: CLINIC | Age: 63
End: 2018-12-11
Payer: COMMERCIAL

## 2018-12-11 VITALS
BODY MASS INDEX: 28.41 KG/M2 | HEART RATE: 60 BPM | WEIGHT: 170.5 LBS | DIASTOLIC BLOOD PRESSURE: 72 MMHG | HEIGHT: 65 IN | RESPIRATION RATE: 16 BRPM | TEMPERATURE: 98 F | SYSTOLIC BLOOD PRESSURE: 122 MMHG

## 2018-12-11 DIAGNOSIS — R10.32 ABDOMINAL PAIN, LEFT LOWER QUADRANT: ICD-10-CM

## 2018-12-11 DIAGNOSIS — R10.32 ABDOMINAL PAIN, LEFT LOWER QUADRANT: Primary | ICD-10-CM

## 2018-12-11 DIAGNOSIS — K57.32 DIVERTICULITIS OF COLON: ICD-10-CM

## 2018-12-11 PROCEDURE — 74019 RADEX ABDOMEN 2 VIEWS: CPT | Mod: FY

## 2018-12-11 PROCEDURE — 99214 OFFICE O/P EST MOD 30 MIN: CPT | Performed by: PHYSICIAN ASSISTANT

## 2018-12-11 ASSESSMENT — MIFFLIN-ST. JEOR: SCORE: 1490.26

## 2018-12-11 NOTE — PROGRESS NOTES
"  SUBJECTIVE:   Juancarlos Ma is a 63 year old male who presents to clinic today for the following health issues:      Abdominal issue     Onset: x1 wk     Description:   Frequency of bowel movements: every morning .  Stool consistency: a little bit of liquid-   Small amount of liquid stool        Progression of Symptoms:  worsening    Accompanying Signs & Symptoms:  Abdominal pain (cramping?): YES  Blood in stool: no   Rectal pain: no   Nausea/vomiting: no   Weight loss or gain: no   Chills last night  Left side abdominal pain and lower abdominal pain  Feeling need to defecate but not able to have a BM     History:   History of abdominal surgery: no   Hx of diverticulitis in the past     Precipitating factors:   Recent use of narcotics, anticholinergics, calcium channel blockers, antacids, or iron supplements: YES- antacids-1 eugene a day   Chronic Laxative Use: no          Therapies Tried and outcome: laxatives-only one time-with no relief           -------------------------------------    Problem list and histories reviewed & adjusted, as indicated.  Additional history: as documented    Labs reviewed in EPIC    Reviewed and updated as needed this visit by clinical staff  Tobacco  Allergies  Meds       Reviewed and updated as needed this visit by Provider  Allergies  Meds         ROS:  Constitutional, HEENT, cardiovascular, pulmonary, gi and gu systems are negative, except as otherwise noted.    OBJECTIVE:     /72 (BP Location: Left arm, Patient Position: Chair, Cuff Size: Adult Regular)   Pulse 60   Temp 98  F (36.7  C) (Oral)   Resp 16   Ht 1.651 m (5' 5\")   Wt 77.3 kg (170 lb 8 oz)   BMI 28.37 kg/m    Body mass index is 28.37 kg/m .  GENERAL: healthy, alert and no distress  RESP: lungs clear to auscultation - no rales, rhonchi or wheezes  CV: regular rate and rhythm, normal S1 S2, no S3 or S4, no murmur, click or rub, no peripheral edema and peripheral pulses strong  ABDOMEN: tenderness LLQ and " mild and bowel sounds normal  MS: no gross musculoskeletal defects noted, no edema  SKIN: no suspicious lesions or rashes    Diagnostic Test Results:  ABDOMEN TWO-THREE VIEWS  12/11/2018 6:22 PM      HISTORY: Abdominal pain, left lower quadrant.     COMPARISON: None.     FINDINGS: Small amount of stool. No free air. There are no air filled  distended loops of small bowel. The colon is not distended. The lung  bases are unremarkable.                                                                      IMPRESSION: Nonobstructed bowel gas pattern.    ASSESSMENT/PLAN:             1. Abdominal pain, left lower quadrant    - XR Abdomen 2 Views; Future    2. Diverticulitis of colon    - amoxicillin-clavulanate (AUGMENTIN) 875-125 MG tablet; Take 1 tablet by mouth 3 times daily for 10 days  Dispense: 30 tablet; Refill: 0    Reviewed symptoms   With chills and abdominal pain last night worsening treatment as above  Reviewed hx of PCN allergy on his chart- states as a child only with rash. Has not had PCN since.   Would like to try augmentin as does not like Flagyl - upset stomach with it metallic taste and no alcohol use.     Patient fully aware if any rash to stop med and take benadryl  Reviewed sxs of severe reaction as well - sob tongue or throat swelling to ER    If abdominal pain does not improve with abx course then would refer for colonoscopy due to stool changes.         25 minutes spent with patient > 50% of time on counseling and plan of care.      See Patient Instructions    Julianne Shepherd PA-C  [facility]

## 2018-12-12 NOTE — PATIENT INSTRUCTIONS
Patient Education     Low-Fiber Diet     Eggs are high in protein and easy to digest.   Eating a low-fiber diet means eating foods that don t have much fiber. These foods are easy to digest.  Most of the fiber that you eat passes undigested through your bowel. This is what forms stool. Low-fiber foods can help to slow down your bowel movements. When you eat a low-fiber diet, you have fewer stools. This lets your intestine rest.  Your healthcare provider will tell you how long you need to be on this diet. It may only be for a short time. Low-fiber foods often don t give you all the nutrients you need to stay healthy. Your healthcare provider may have you take certain vitamins while you are on this diet.  Reasons to eat a low-fiber diet  The goal of a low-fiber diet is to limit the size and number of your stools. It may be prescribed if you:    Are going through chemotherapy or radiation treatments    Have had intestinal surgery    Have a condition that affects your intestine, such as irritable bowel syndrome, Crohn s disease, ulcerative colitis, or diverticulitis  General guidelines for a low-fiber diet  In general, a low-fiber diet means having fewer than 13 grams of fiber a day. Your healthcare provider may give you a list of things you can and can t eat or drink. Read food labels. Choose foods and drinks that have as close to zero grams of fiber as possible. Here are general guidelines to follow:  Breads, pasta, cereal, rice, and other starches (6 to 11 servings daily)    What to choose: white bread, biscuits, muffins, and white rolls; plain crackers; waffles; white pasta; white rice; cream of wheat; grits; white pancakes; corn flakes; cooked potatoes without skin. Fiber content of these foods should be less than 0.5 (1/2) gram per serving.    What to avoid: whole-wheat or whole-grain breads, crackers, and pasta; breads with seeds or nuts; wheat germ; eli crackers; cornbread; wild or brown rice; cereals with  whole-grain, bran, and granola; cereals with seeds, nuts, coconut, or dried fruit; potatoes with skin  Milk and dairy (2 servings daily)    What to choose: milk, buttermilk; yogurt or ice cream without seeds or nuts; custard or pudding; sour cream; cheese and cottage cheese    What to avoid: ice cream and yogurt with seeds, nuts, or fruit chunks  Fruit (2 to 4 servings daily)    What to choose: ripe banana; ripe nectarine, peach, apricot, papaya, and plum; soft honeydew melon and cantaloupe; cooked or canned fruit without skin or seeds (not sweetened with sorbitol); applesauce; strained fruit juice (without pulp)    What to avoid: raw or dried fruit; all berries; raisins; canned and raw pineapple; prunes and prune juice; fruit juice with pulp  Vegetables (3 to 5 servings daily)    What to choose: well-cooked or canned vegetables without seeds, such as spinach, eggplant, green and wax beans, carrots, yellow squash, pumpkin; lettuce on a sandwich    What to avoid: all raw or steamed vegetables; vegetables with seeds, such as unstrained tomato sauce; green peas; lima beans; broccoli; corn; parsnips  Meats and protein (4 to 6 ounces daily)    What to choose: tender, well-cooked meat, including ground meat, poultry, and fish; eggs; tofu; creamy peanut butter    What to avoid: tough, chewy meat with gristle; peas, including split, yellow, and black-eyed; beans, including navy, lima, black, garbanzo, soy, ojeda, and lentil; peanuts and crunchy peanut butter   Fats, oils, sauces, condiments (fewer than 8 teaspoons daily)    What to choose: butter, margarine, oils, whipped cream, sour cream, mayonnaise, smooth dressings and sauces; plain gravy; smooth condiments    What to avoid: dressing with seeds or fruit chunks; pickles and relishes  Other foods and drinks    What to choose: water; plain gelatin; plain puddings; pretzels; plain cookies and cakes; honey, syrup; decaffeinated drinks, including tea and coffee      What to  avoid: popcorn; potato chips; spicy foods; fried, greasy foods; alcohol (ask your healthcare provider); marmalade, jam, and preserves; desserts that have seeds, nuts, coconut, dried fruit, whole grains, or bran; candy that has seeds or nuts; drinks sweetened with sorbitol or other sugar substitutes; caffeinated drinks, including tea, coffee, soda, and energy drinks  Date Last Reviewed: 6/1/2017 2000-2018 [employer]. [address] [place] [address]. All rights reserved. This information is not intended as a substitute for professional medical care. Always follow your healthcare professional's instructions.

## 2018-12-16 ENCOUNTER — HOSPITAL ENCOUNTER (INPATIENT)
Facility: CLINIC | Age: 63
LOS: 3 days | Discharge: HOME | DRG: 389 | End: 2018-12-19
Attending: PHYSICIAN ASSISTANT | Admitting: INTERNAL MEDICINE
Payer: COMMERCIAL

## 2018-12-16 ENCOUNTER — APPOINTMENT (OUTPATIENT)
Dept: CT IMAGING | Facility: CLINIC | Age: 63
DRG: 389 | End: 2018-12-16
Attending: PHYSICIAN ASSISTANT
Payer: COMMERCIAL

## 2018-12-16 DIAGNOSIS — K56.609 SMALL BOWEL OBSTRUCTION (H): ICD-10-CM

## 2018-12-16 DIAGNOSIS — K57.32 DIVERTICULITIS OF SIGMOID COLON: Primary | ICD-10-CM

## 2018-12-16 DIAGNOSIS — N17.9 AKI (ACUTE KIDNEY INJURY) (H): ICD-10-CM

## 2018-12-16 LAB
ALBUMIN SERPL-MCNC: 3.5 G/DL (ref 3.4–5)
ALBUMIN UR-MCNC: 10 MG/DL
ALP SERPL-CCNC: 65 U/L (ref 40–150)
ALT SERPL W P-5'-P-CCNC: 17 U/L (ref 0–70)
ANION GAP SERPL CALCULATED.3IONS-SCNC: 12 MMOL/L (ref 3–14)
APPEARANCE UR: CLEAR
AST SERPL W P-5'-P-CCNC: 14 U/L (ref 0–45)
BASOPHILS # BLD AUTO: 0 10E9/L (ref 0–0.2)
BASOPHILS NFR BLD AUTO: 0.3 %
BILIRUB SERPL-MCNC: 0.8 MG/DL (ref 0.2–1.3)
BILIRUB UR QL STRIP: NEGATIVE
BUN SERPL-MCNC: 30 MG/DL (ref 7–30)
CALCIUM SERPL-MCNC: 9.8 MG/DL (ref 8.5–10.1)
CHLORIDE SERPL-SCNC: 101 MMOL/L (ref 94–109)
CO2 SERPL-SCNC: 25 MMOL/L (ref 20–32)
COLOR UR AUTO: YELLOW
CREAT SERPL-MCNC: 1.63 MG/DL (ref 0.66–1.25)
DIFFERENTIAL METHOD BLD: ABNORMAL
EOSINOPHIL # BLD AUTO: 0.2 10E9/L (ref 0–0.7)
EOSINOPHIL NFR BLD AUTO: 1.3 %
ERYTHROCYTE [DISTWIDTH] IN BLOOD BY AUTOMATED COUNT: 12.4 % (ref 10–15)
GFR SERPL CREATININE-BSD FRML MDRD: 43 ML/MIN/1.7M2
GLUCOSE SERPL-MCNC: 109 MG/DL (ref 70–99)
GLUCOSE UR STRIP-MCNC: NEGATIVE MG/DL
HCT VFR BLD AUTO: 40.8 % (ref 40–53)
HGB BLD-MCNC: 14.2 G/DL (ref 13.3–17.7)
HGB UR QL STRIP: NEGATIVE
HYALINE CASTS #/AREA URNS LPF: 49 /LPF (ref 0–2)
IMM GRANULOCYTES # BLD: 0 10E9/L (ref 0–0.4)
IMM GRANULOCYTES NFR BLD: 0.2 %
KETONES UR STRIP-MCNC: NEGATIVE MG/DL
LEUKOCYTE ESTERASE UR QL STRIP: NEGATIVE
LIPASE SERPL-CCNC: 100 U/L (ref 73–393)
LYMPHOCYTES # BLD AUTO: 1.6 10E9/L (ref 0.8–5.3)
LYMPHOCYTES NFR BLD AUTO: 12.5 %
MCH RBC QN AUTO: 33.3 PG (ref 26.5–33)
MCHC RBC AUTO-ENTMCNC: 34.8 G/DL (ref 31.5–36.5)
MCV RBC AUTO: 96 FL (ref 78–100)
MONOCYTES # BLD AUTO: 0.9 10E9/L (ref 0–1.3)
MONOCYTES NFR BLD AUTO: 7.3 %
MUCOUS THREADS #/AREA URNS LPF: PRESENT /LPF
NEUTROPHILS # BLD AUTO: 9.7 10E9/L (ref 1.6–8.3)
NEUTROPHILS NFR BLD AUTO: 78.4 %
NITRATE UR QL: NEGATIVE
NRBC # BLD AUTO: 0 10*3/UL
NRBC BLD AUTO-RTO: 0 /100
PH UR STRIP: 5.5 PH (ref 5–7)
PLATELET # BLD AUTO: 298 10E9/L (ref 150–450)
POTASSIUM SERPL-SCNC: 3.8 MMOL/L (ref 3.4–5.3)
PROT SERPL-MCNC: 8.1 G/DL (ref 6.8–8.8)
RBC # BLD AUTO: 4.27 10E12/L (ref 4.4–5.9)
RBC #/AREA URNS AUTO: <1 /HPF (ref 0–2)
SODIUM SERPL-SCNC: 138 MMOL/L (ref 133–144)
SOURCE: ABNORMAL
SP GR UR STRIP: 1.01 (ref 1–1.03)
UROBILINOGEN UR STRIP-MCNC: NORMAL MG/DL (ref 0–2)
WBC # BLD AUTO: 12.4 10E9/L (ref 4–11)
WBC #/AREA URNS AUTO: 1 /HPF (ref 0–5)

## 2018-12-16 PROCEDURE — 25000128 H RX IP 250 OP 636: Performed by: INTERNAL MEDICINE

## 2018-12-16 PROCEDURE — 96361 HYDRATE IV INFUSION ADD-ON: CPT

## 2018-12-16 PROCEDURE — 74176 CT ABD & PELVIS W/O CONTRAST: CPT

## 2018-12-16 PROCEDURE — 25000128 H RX IP 250 OP 636: Performed by: PHYSICIAN ASSISTANT

## 2018-12-16 PROCEDURE — 81001 URINALYSIS AUTO W/SCOPE: CPT | Performed by: PHYSICIAN ASSISTANT

## 2018-12-16 PROCEDURE — 99285 EMERGENCY DEPT VISIT HI MDM: CPT | Mod: 25

## 2018-12-16 PROCEDURE — 96365 THER/PROPH/DIAG IV INF INIT: CPT

## 2018-12-16 PROCEDURE — 85025 COMPLETE CBC W/AUTO DIFF WBC: CPT | Performed by: PHYSICIAN ASSISTANT

## 2018-12-16 PROCEDURE — 80053 COMPREHEN METABOLIC PANEL: CPT | Performed by: PHYSICIAN ASSISTANT

## 2018-12-16 PROCEDURE — 83690 ASSAY OF LIPASE: CPT | Performed by: PHYSICIAN ASSISTANT

## 2018-12-16 PROCEDURE — 99223 1ST HOSP IP/OBS HIGH 75: CPT | Mod: AI | Performed by: INTERNAL MEDICINE

## 2018-12-16 PROCEDURE — 25000128 H RX IP 250 OP 636: Performed by: EMERGENCY MEDICINE

## 2018-12-16 PROCEDURE — C9113 INJ PANTOPRAZOLE SODIUM, VIA: HCPCS | Performed by: INTERNAL MEDICINE

## 2018-12-16 PROCEDURE — 12000000 ZZH R&B MED SURG/OB

## 2018-12-16 RX ORDER — CIPROFLOXACIN 2 MG/ML
400 INJECTION, SOLUTION INTRAVENOUS ONCE
Status: DISCONTINUED | OUTPATIENT
Start: 2018-12-16 | End: 2018-12-16

## 2018-12-16 RX ORDER — NALOXONE HYDROCHLORIDE 0.4 MG/ML
.1-.4 INJECTION, SOLUTION INTRAMUSCULAR; INTRAVENOUS; SUBCUTANEOUS
Status: DISCONTINUED | OUTPATIENT
Start: 2018-12-16 | End: 2018-12-19 | Stop reason: HOSPADM

## 2018-12-16 RX ORDER — ONDANSETRON 4 MG/1
4 TABLET, ORALLY DISINTEGRATING ORAL EVERY 6 HOURS PRN
Status: DISCONTINUED | OUTPATIENT
Start: 2018-12-16 | End: 2018-12-19 | Stop reason: HOSPADM

## 2018-12-16 RX ORDER — OXYCODONE AND ACETAMINOPHEN 5; 325 MG/1; MG/1
1-2 TABLET ORAL EVERY 4 HOURS PRN
Status: DISCONTINUED | OUTPATIENT
Start: 2018-12-16 | End: 2018-12-19 | Stop reason: HOSPADM

## 2018-12-16 RX ORDER — PROCHLORPERAZINE 25 MG
25 SUPPOSITORY, RECTAL RECTAL EVERY 12 HOURS PRN
Status: DISCONTINUED | OUTPATIENT
Start: 2018-12-16 | End: 2018-12-19 | Stop reason: HOSPADM

## 2018-12-16 RX ORDER — DIPHENHYDRAMINE HCL 25 MG
25 TABLET ORAL EVERY 8 HOURS PRN
COMMUNITY
End: 2019-06-15

## 2018-12-16 RX ORDER — HYDROMORPHONE HYDROCHLORIDE 1 MG/ML
.3-.5 INJECTION, SOLUTION INTRAMUSCULAR; INTRAVENOUS; SUBCUTANEOUS
Status: DISCONTINUED | OUTPATIENT
Start: 2018-12-16 | End: 2018-12-19 | Stop reason: HOSPADM

## 2018-12-16 RX ORDER — ACETAMINOPHEN 325 MG/1
650 TABLET ORAL EVERY 4 HOURS PRN
Status: DISCONTINUED | OUTPATIENT
Start: 2018-12-16 | End: 2018-12-19 | Stop reason: HOSPADM

## 2018-12-16 RX ORDER — PROCHLORPERAZINE MALEATE 5 MG
10 TABLET ORAL EVERY 6 HOURS PRN
Status: DISCONTINUED | OUTPATIENT
Start: 2018-12-16 | End: 2018-12-19 | Stop reason: HOSPADM

## 2018-12-16 RX ORDER — CIPROFLOXACIN 2 MG/ML
400 INJECTION, SOLUTION INTRAVENOUS EVERY 12 HOURS
Status: DISCONTINUED | OUTPATIENT
Start: 2018-12-17 | End: 2018-12-19 | Stop reason: HOSPADM

## 2018-12-16 RX ORDER — SODIUM CHLORIDE 9 MG/ML
INJECTION, SOLUTION INTRAVENOUS CONTINUOUS
Status: DISCONTINUED | OUTPATIENT
Start: 2018-12-16 | End: 2018-12-19 | Stop reason: HOSPADM

## 2018-12-16 RX ORDER — ONDANSETRON 2 MG/ML
4 INJECTION INTRAMUSCULAR; INTRAVENOUS EVERY 6 HOURS PRN
Status: DISCONTINUED | OUTPATIENT
Start: 2018-12-16 | End: 2018-12-19 | Stop reason: HOSPADM

## 2018-12-16 RX ORDER — ASPIRIN 81 MG/1
81 TABLET ORAL DAILY
Status: ON HOLD | COMMUNITY
End: 2019-06-18

## 2018-12-16 RX ADMIN — SODIUM CHLORIDE 1000 ML: 9 INJECTION, SOLUTION INTRAVENOUS at 13:55

## 2018-12-16 RX ADMIN — PANTOPRAZOLE SODIUM 40 MG: 40 INJECTION, POWDER, FOR SOLUTION INTRAVENOUS at 19:20

## 2018-12-16 RX ADMIN — METRONIDAZOLE 500 MG: 500 INJECTION, SOLUTION INTRAVENOUS at 16:15

## 2018-12-16 RX ADMIN — CIPROFLOXACIN 400 MG: 2 INJECTION, SOLUTION INTRAVENOUS at 17:47

## 2018-12-16 RX ADMIN — METRONIDAZOLE 500 MG: 500 INJECTION, SOLUTION INTRAVENOUS at 23:39

## 2018-12-16 RX ADMIN — SODIUM CHLORIDE: 9 INJECTION, SOLUTION INTRAVENOUS at 23:39

## 2018-12-16 RX ADMIN — SODIUM CHLORIDE: 9 INJECTION, SOLUTION INTRAVENOUS at 19:20

## 2018-12-16 ASSESSMENT — ENCOUNTER SYMPTOMS
BLOOD IN STOOL: 0
ABDOMINAL PAIN: 1
APPETITE CHANGE: 1
DIARRHEA: 1
CHILLS: 1
VOMITING: 1
NAUSEA: 1
UNEXPECTED WEIGHT CHANGE: 1
FEVER: 1
BACK PAIN: 1
DYSURIA: 1
HEMATURIA: 0

## 2018-12-16 ASSESSMENT — ACTIVITIES OF DAILY LIVING (ADL)
ADLS_ACUITY_SCORE: 11
TRANSFERRING: 0-->INDEPENDENT
AMBULATION: 0-->INDEPENDENT
COGNITION: 0 - NO COGNITION ISSUES REPORTED
FALL_HISTORY_WITHIN_LAST_SIX_MONTHS: NO
RETIRED_COMMUNICATION: 0-->UNDERSTANDS/COMMUNICATES WITHOUT DIFFICULTY
TOILETING: 0-->INDEPENDENT
SWALLOWING: 0-->SWALLOWS FOODS/LIQUIDS WITHOUT DIFFICULTY
DRESS: 0-->INDEPENDENT
BATHING: 0-->INDEPENDENT
RETIRED_EATING: 0-->INDEPENDENT

## 2018-12-16 ASSESSMENT — MIFFLIN-ST. JEOR: SCORE: 1472.11

## 2018-12-16 NOTE — ED NOTES
"[facility]  ED Nurse Handoff Report    ED Chief complaint: Abdominal Pain (Patient taking antibiotics for diverticulitis on 12/11. Developed nausea and vomiting last night. Diarrhea today.)      ED Diagnosis:   Final diagnoses:   Small bowel obstruction (H)   MAG (acute kidney injury) (H)       Code Status: Full Code    Allergies:   Allergies   Allergen Reactions     Lisinopril      Cough with Lisinopril     Penicillin [Penicillins] Hives       Activity level - Baseline/Home:  Independent    Activity Level - Current:   Independent     Needed?: No    Isolation: No  Infection: Not Applicable  Bariatric?: No    Vital Signs:   Vitals:    12/16/18 1325   BP: 105/62   Pulse: 84   Resp: 16   Temp: 97.2  F (36.2  C)   TempSrc: Temporal   SpO2: 97%   Weight: 73.9 kg (163 lb)   Height: 1.676 m (5' 6\")       Cardiac Rhythm: ,        Pain level: 0-10 Pain Scale: 3    Is this patient confused?: No   Does this patient have a guardian?  No         If yes, is there guardianship documents in the Epic \"Code/ACP\" activity?  No         Guardian Notified?  N/A  [place] - Suicide Severity Rating Scale Completed?  No, secondary to presentation  If yes, what color did the patient score?  White    Patient Report: Initial Complaint: Abdominal pain. Vomiting last night but patient states it's because he ate too much applesauce.   Focused Assessment: A&Ox4. Denies/underrates pain.  Tests Performed: CT w/out contrast, Labs, urine.  Abnormal Results:   Results for orders placed or performed during the hospital encounter of 12/16/18   Abd/pelvis CT no contrast - Stone Protocol    Narrative    CT ABDOMEN AND PELVIS WITHOUT MJDGOLPU83/16/2018 3:01 PM     HISTORY: Abdominal pain, diverticulitis suspected. Concern for  diverticulitis, started on Augmentin 4 days ago, vomiting and  diarrhea. Elevated creat so no contrast.    TECHNIQUE:  Axial images with reconstructions. No IV contrast.  Radiation dose for this scan was " reduced using automated exposure  control, adjustment of the mA and/or kV according to patient size, or  iterative reconstruction technique.    COMPARISON:  2-3-16    FINDINGS:  Colonic diverticulosis. There appears to be sigmoid and  rectosigmoid wall thickening. The length of involvement is more  suggestive of colitis than diverticulitis. There are dilated small  bowel loops and decompressed distal small bowel loops, consistent with  obstruction. Unremarkable appendix. Minimal free fluid in the lower  pelvis.      Impression    IMPRESSION:  1. Small bowel obstruction.  2. Suspected sigmoid colitis.     CBC with platelets differential   Result Value Ref Range    WBC 12.4 (H) 4.0 - 11.0 10e9/L    RBC Count 4.27 (L) 4.4 - 5.9 10e12/L    Hemoglobin 14.2 13.3 - 17.7 g/dL    Hematocrit 40.8 40.0 - 53.0 %    MCV 96 78 - 100 fl    MCH 33.3 (H) 26.5 - 33.0 pg    MCHC 34.8 31.5 - 36.5 g/dL    RDW 12.4 10.0 - 15.0 %    Platelet Count 298 150 - 450 10e9/L    Diff Method Automated Method     % Neutrophils 78.4 %    % Lymphocytes 12.5 %    % Monocytes 7.3 %    % Eosinophils 1.3 %    % Basophils 0.3 %    % Immature Granulocytes 0.2 %    Nucleated RBCs 0 0 /100    Absolute Neutrophil 9.7 (H) 1.6 - 8.3 10e9/L    Absolute Lymphocytes 1.6 0.8 - 5.3 10e9/L    Absolute Monocytes 0.9 0.0 - 1.3 10e9/L    Absolute Eosinophils 0.2 0.0 - 0.7 10e9/L    Absolute Basophils 0.0 0.0 - 0.2 10e9/L    Abs Immature Granulocytes 0.0 0 - 0.4 10e9/L    Absolute Nucleated RBC 0.0    Comprehensive metabolic panel   Result Value Ref Range    Sodium 138 133 - 144 mmol/L    Potassium 3.8 3.4 - 5.3 mmol/L    Chloride 101 94 - 109 mmol/L    Carbon Dioxide 25 20 - 32 mmol/L    Anion Gap 12 3 - 14 mmol/L    Glucose 109 (H) 70 - 99 mg/dL    Urea Nitrogen 30 7 - 30 mg/dL    Creatinine 1.63 (H) 0.66 - 1.25 mg/dL    GFR Estimate 43 (L) >60 mL/min/1.7m2    GFR Estimate If Black 52 (L) >60 mL/min/1.7m2    Calcium 9.8 8.5 - 10.1 mg/dL    Bilirubin Total 0.8 0.2 - 1.3  mg/dL    Albumin 3.5 3.4 - 5.0 g/dL    Protein Total 8.1 6.8 - 8.8 g/dL    Alkaline Phosphatase 65 40 - 150 U/L    ALT 17 0 - 70 U/L    AST 14 0 - 45 U/L   Lipase   Result Value Ref Range    Lipase 100 73 - 393 U/L       Treatments provided: IV fluids, NPO    Family Comments: Sister bedside.    OBS brochure/video discussed/provided to patient/family: No              Name of person given brochure if not patient: N/a              Relationship to patient: N/A    ED Medications:   Medications   0.9% sodium chloride BOLUS (1,000 mLs Intravenous New Bag 12/16/18 1355)       Drips infusing?:  Yes, NS    For the majority of the shift this patient was Green.   Interventions performed were IV fluid, ice chips.    Severe Sepsis OR Septic Shock Diagnosis Present: No    To be done/followed up on inpatient unit:  N/A    ED NURSE PHONE NUMBER: [phone]

## 2018-12-16 NOTE — ED PROVIDER NOTES
History     Chief Complaint:  Abdominal pain    HPI   Juancarlos Ma is a 63 year old male with a history of diverticulitis and prostate cancer s/p prostatectomy, in remission, who presents to the emergency department for evaluation of abdominal pain. The patient saw a PA on 12/11 after a week of consistent abdominal pain, chills, back pain, diarrhea and low grade fever. XR of his abdomen was taken (see below), and he was started on Augmentin 875-125 TID for suspected diverticulitis. Today, the patient reports that his symptoms have continued with abdominal pain across his low abdomen, radiating around into his back, diarrhea and decreased stool, nausea, a weight loss of five pounds this week, decreased appetite, and his sister adds that he vomited twice last night.  He did not vomit today. He feels that the Augmentin has not helped his symptoms; he notes a history of allergic reactions to Penicillin, wherein he broke out in hives. However, he states that he did not want to take Flagyl (does not like side effects) and so took the Augmentin instead. Because his symptoms have not improved, he stopped taking the Augmentin last night, per his report. He also has stopped taking his daily 81mg Aspirin because he says that it was making his abdominal pain worse. The patient denies any other recent illness, melena, hematochezia, hematuria, hematemesis, and any injury to his abdomen or back recently. Of note, his last colonoscopy was in 2016 and he had one polyp retrieved then. The PA the patient saw on 12/11 discussed with him that colonoscopy was the next likely step if the abdominal pain did not improve with his antibiotics. He has no further concerns at this time.     ABDOMEN TWO-THREE VIEWS  12/11/2018 6:22 PM             IMPRESSION: Nonobstructed bowel gas pattern.     PATRICK LOZADA MD    Most recent Creatinine 02/2018  Component      Latest Ref Rng & Units 2/26/2018   Creatinine      0.66 - 1.25 mg/dL 0.88   GFR  "Estimate      >60 mL/min/1.7m2 88       Allergies:  Lisinopril  Penicillin [Penicillins]      Medications:    Augmentin  Aspirin    Hyzaar     Past Medical History:    Diverticulosis of colon  Essential hypertension   Fracture of fifth metacarpal bone of left hand  Prostate cancer  Tobacco use disorder  Hyperlipidemia    Advance care planning     Past Surgical History:    DaVinci prostatectomy  Tooth extraction w Forcep    Family History:    Hypertension  Type II DM   Lymphoma   COPD   Scoliosis    Social History:  The patient was accompanied to the ED by his sister.  Smoking Status: Former smoker 0.5 PPD for 40 years; cigarettes   Smokeless Tobacco: Never  Alcohol Use: Yes   Marital Status:  Single      Review of Systems   Constitutional: Positive for appetite change, chills, fever and unexpected weight change.   Gastrointestinal: Positive for abdominal pain, diarrhea, nausea and vomiting (no hematemesis). Negative for blood in stool.   Genitourinary: Positive for dysuria. Negative for hematuria.   Musculoskeletal: Positive for back pain.   All other systems reviewed and are negative.    Physical Exam     Patient Vitals for the past 24 hrs:   BP Temp Temp src Pulse Resp SpO2 Height Weight   12/16/18 1325 105/62 97.2  F (36.2  C) Temporal 84 16 97 % 1.676 m (5' 6\") 73.9 kg (163 lb)      Physical Exam  General: Resting comfortably.  Alert and oriented.   Head:  The scalp, face, and head appear normal   Eyes:  Conjunctivae and sclerae are normal    ENT:    The oropharynx is normal    Uvula is in the midline     Moist mucous membranes   Neck:  No lymphadenopathy  CV:  Regular rate and rhythm     Normal S1/S2  Resp:  Lungs are clear to auscultation    Non-labored    No rales or wheezing   GI:  Abdomen is soft, non-distended    Mild tenderness to his left side. No definite LLQ and RLQ tenderness.     No rebound or guarding    Normal bowel sounds   MS:  Moving all 4 extremities  Skin:  No rash or acute skin lesions noted "   Neuro: Speech is normal and fluent.     Emergency Department Course     Imaging:  Radiology findings were communicated with the patient and family who voiced understanding of the findings.    Abdominal/pelvis CT no contrast - Stone Protocol:  IMPRESSION:  1. Small bowel obstruction.  2. Suspected sigmoid colitis.  Report per radiology     Laboratory:  Laboratory findings were communicated with the patient and family who voiced understanding of the findings.    CBC: WBC 12.4 (H) o/w WNL. (HGB 14.2, )   CMP: Glucose 109 (H), Creatinine 1.63 (H), GFR Estimate 43 (L) o/w WNL     Lipase: 100    Interventions:  1355 - NS Bolus 1,000mL IV   1615 - Flagyl 500mg IV    Medications   ciprofloxacin (CIPRO) infusion 400 mg (not administered)   metroNIDAZOLE (FLAGYL) infusion 500 mg (500 mg Intravenous New Bag 12/16/18 1615)   0.9% sodium chloride BOLUS (1,000 mLs Intravenous New Bag 12/16/18 1355)        Emergency Department Course:  Nursing notes and vitals reviewed.  IV was inserted and blood was drawn for laboratory testing, results above.  The patient provided a urine sample here in the emergency department. This was sent for laboratory testing, findings above.    1345: I performed an exam of the patient as documented above.     1434: Patient rechecked and updated.    1535: Patient rechecked and updated.  I spoke to Dr. Bandar Calles about the patient's presentation and plan of care, and he agreed to staff the patient with me.    1547: I spoke with Dr. Bustos of the Hospitalist service regarding patient's presentation, findings, and plan of care.    1554: I spoke with Dr. Bustos of the Hospitalist service regarding patient's presentation, findings, and plan of care.    Findings and plan explained to the Patient and sister who consents to admission. Discussed the patient with Dr. Bustos, who will admit the patient to a inpatient medical bed for further monitoring, evaluation, and treatment.  I personally reviewed the  laboratory and imaging results with the Patient and sister and answered all related questions prior to admission.    Impression & Plan      Medical Decision Making:  Juancarlos Ma is a 63 year old male with a history of diverticulitis who presents to the emergency department for evaluation of abdominal pain.  He presents vitally stable and afebrile. He is well-appearing.  He states this has has abdominal pain ongoing over the last week and a half.  He did see his primary care doctor on 12/11 and was diagnosed with presumed diverticulitis and was started on Augmentin based on his symptoms and history.  He has been taking this medication, but despite this he has ongoing abdominal pain albeit slight and did have a couple of episodes of vomiting yesterday.  He is also noted loose stools in association with this.  He denies any fevers, chills, or any infectious symptoms.  CBC demonstrates mild leukocytosis.  BMP demonstrates acute kidney injury with a creatinine of 1.63.  His most recent creatinine on 02/26/2018 was 0.88.  CT demonstrates a small bowel obstruction. Given the need for admission, Dr. Calles was asked to staff this patient with me.  Please refer to his note for further details. The patient is not any any significant discomfort nor is he vomiting, therefore I do not believe NG tube is needed at this point.  Dr. Bustos was contacted and agrees to admission of this patient.  Patient is agreeable to admission.  All questions were answered prior to admission.    Disposition:  Admitted to an inpatient medical bed.    Scribe Disclosure:  Lizeth GUEVARA, am serving as a scribe at 1:54 PM on 12/16/2018 to document services personally performed by Mirtha Mendoza PA based on my observations and the provider's statements to me.   12/16/2018    EMERGENCY DEPARTMENT       Mirtha Mendoza PA-C  12/16/18 [id number]

## 2018-12-16 NOTE — PHARMACY-ADMISSION MEDICATION HISTORY
"Admission medication history interview status for the 12/16/2018  admission is complete. See EPIC admission navigator for prior to admission medications     Medication history source reliability:Good    Actions taken by pharmacist (provider contacted, etc):None     Additional medication history information not noted on PTA med list : patient stopped taking his Augmentin and aspirin recently due to stomach discomfort (he was \"doubled over\"). Previously, he was told by his pharmacist to take diphenhydramine before taking Augmentin to avoid an allergic reaction. Patient did as instructed and did not experience any allergy symptoms while on Augmentin.    Medication added: diphenhydramine    Medication reconciliation/reorder completed by provider prior to medication history? Yes    Time spent in this activity: 10 minutes    Prior to Admission medications    Medication Sig Last Dose Taking? Auth Provider   amoxicillin-clavulanate (AUGMENTIN) 875-125 MG tablet Take 1 tablet by mouth 3 times daily For 10 days. Day 1 was 12/11/2018 - patient had one dose on that day. 12/15/2018 at 2x Yes Unknown, Entered By History   aspirin 81 MG EC tablet Take 81 mg by mouth daily 12/15/2018 at Unknown time Yes Unknown, Entered By History   diphenhydrAMINE (BENADRYL) 25 MG tablet Take 25 mg by mouth every 8 hours as needed for itching or allergies  at prn Yes Unknown, Entered By History   losartan-hydrochlorothiazide (HYZAAR) 100-25 MG per tablet Take 1 tablet by mouth every morning 12/16/2018 at Unknown time Yes Wili Arevalo MD       "

## 2018-12-16 NOTE — LETTER
[facility]  [address] [place] [address]  [phone]          December 19, 2018    RE:  Juancarlos Ma                                                                                                                                                       [address] [facility]  [facility] [address]            To whom it may concern:    Juancarlos Ma is under my professional care for his recent hospitalization. Please excuse him from any missed work from 12/16/18 - 12/19/18. He is cleared to return to work on 12/20/18.        Sincerely,        Gaby Lopez PA-C  [facility]

## 2018-12-16 NOTE — PLAN OF CARE
RECEIVING UNIT ED HANDOFF REVIEW    ED Nurse Handoff Report was reviewed by: Rachel Acosta on December 16, 2018 at 4:33 PM

## 2018-12-16 NOTE — ED PROVIDER NOTES
"Emergency Department Attending Supervision Note  12/16/2018  3:39 PM      I evaluated this patient in conjunction with Mirtha Mendoza      Briefly, the patient presented with  Abdominal pain, vomiting yesterday      On my exam,   Vitals:    12/16/18 1325   BP: 105/62   Pulse: 84   Resp: 16   Temp: 97.2  F (36.2  C)   TempSrc: Temporal   SpO2: 97%   Weight: 73.9 kg (163 lb)   Height: 1.676 m (5' 6\")      Sitting Upright  Appears comfortable    Results:  Abd/pelvis CT no contrast - Stone Protocol   Preliminary Result   IMPRESSION:   1. Small bowel obstruction.   2. Suspected sigmoid colitis.            ED course:    My impression is colitis with small bowel obstruction.    Hospitalist recommends cipro + flagyl.    Admit      Diagnosis    ICD-10-CM    1. Small bowel obstruction (H) K56.609    2. MAG (acute kidney injury) (H) N17.9          Bandar Calles MD, Aaron Joseph, MD  12/16/18 1606    "

## 2018-12-17 LAB
ANION GAP SERPL CALCULATED.3IONS-SCNC: 9 MMOL/L (ref 3–14)
BUN SERPL-MCNC: 19 MG/DL (ref 7–30)
C COLI+JEJUNI+LARI FUSA STL QL NAA+PROBE: NOT DETECTED
C DIFF TOX B STL QL: NEGATIVE
CALCIUM SERPL-MCNC: 8.6 MG/DL (ref 8.5–10.1)
CHLORIDE SERPL-SCNC: 106 MMOL/L (ref 94–109)
CO2 SERPL-SCNC: 25 MMOL/L (ref 20–32)
CREAT SERPL-MCNC: 1.06 MG/DL (ref 0.66–1.25)
EC STX1 GENE STL QL NAA+PROBE: NOT DETECTED
EC STX2 GENE STL QL NAA+PROBE: NOT DETECTED
ENTERIC PATHOGEN COMMENT: NORMAL
ERYTHROCYTE [DISTWIDTH] IN BLOOD BY AUTOMATED COUNT: 12.4 % (ref 10–15)
GFR SERPL CREATININE-BSD FRML MDRD: 70 ML/MIN/1.7M2
GLUCOSE BLDC GLUCOMTR-MCNC: 102 MG/DL (ref 70–99)
GLUCOSE SERPL-MCNC: 96 MG/DL (ref 70–99)
HCT VFR BLD AUTO: 39.1 % (ref 40–53)
HGB BLD-MCNC: 13.3 G/DL (ref 13.3–17.7)
MCH RBC QN AUTO: 32.8 PG (ref 26.5–33)
MCHC RBC AUTO-ENTMCNC: 34 G/DL (ref 31.5–36.5)
MCV RBC AUTO: 97 FL (ref 78–100)
NOROV GI+II ORF1-ORF2 JNC STL QL NAA+PR: NOT DETECTED
PLATELET # BLD AUTO: 303 10E9/L (ref 150–450)
POTASSIUM SERPL-SCNC: 3.9 MMOL/L (ref 3.4–5.3)
RBC # BLD AUTO: 4.05 10E12/L (ref 4.4–5.9)
RVA NSP5 STL QL NAA+PROBE: NOT DETECTED
SALMONELLA SP RPOD STL QL NAA+PROBE: NOT DETECTED
SHIGELLA SP+EIEC IPAH STL QL NAA+PROBE: NOT DETECTED
SODIUM SERPL-SCNC: 140 MMOL/L (ref 133–144)
SPECIMEN SOURCE: NORMAL
V CHOL+PARA RFBL+TRKH+TNAA STL QL NAA+PR: NOT DETECTED
WBC # BLD AUTO: 9.3 10E9/L (ref 4–11)
Y ENTERO RECN STL QL NAA+PROBE: NOT DETECTED

## 2018-12-17 PROCEDURE — 99222 1ST HOSP IP/OBS MODERATE 55: CPT | Performed by: SURGERY

## 2018-12-17 PROCEDURE — 87493 C DIFF AMPLIFIED PROBE: CPT | Performed by: INTERNAL MEDICINE

## 2018-12-17 PROCEDURE — 85027 COMPLETE CBC AUTOMATED: CPT | Performed by: INTERNAL MEDICINE

## 2018-12-17 PROCEDURE — C9113 INJ PANTOPRAZOLE SODIUM, VIA: HCPCS | Performed by: INTERNAL MEDICINE

## 2018-12-17 PROCEDURE — 00000146 ZZHCL STATISTIC GLUCOSE BY METER IP

## 2018-12-17 PROCEDURE — 12000000 ZZH R&B MED SURG/OB

## 2018-12-17 PROCEDURE — 25000132 ZZH RX MED GY IP 250 OP 250 PS 637: Performed by: INTERNAL MEDICINE

## 2018-12-17 PROCEDURE — 80048 BASIC METABOLIC PNL TOTAL CA: CPT | Performed by: INTERNAL MEDICINE

## 2018-12-17 PROCEDURE — 36415 COLL VENOUS BLD VENIPUNCTURE: CPT | Performed by: INTERNAL MEDICINE

## 2018-12-17 PROCEDURE — 87506 IADNA-DNA/RNA PROBE TQ 6-11: CPT | Performed by: INTERNAL MEDICINE

## 2018-12-17 PROCEDURE — 99232 SBSQ HOSP IP/OBS MODERATE 35: CPT | Performed by: PHYSICIAN ASSISTANT

## 2018-12-17 PROCEDURE — 25000128 H RX IP 250 OP 636: Performed by: INTERNAL MEDICINE

## 2018-12-17 RX ADMIN — METRONIDAZOLE 500 MG: 500 INJECTION, SOLUTION INTRAVENOUS at 07:46

## 2018-12-17 RX ADMIN — OXYCODONE HYDROCHLORIDE AND ACETAMINOPHEN 1 TABLET: 5; 325 TABLET ORAL at 21:49

## 2018-12-17 RX ADMIN — PANTOPRAZOLE SODIUM 40 MG: 40 INJECTION, POWDER, FOR SOLUTION INTRAVENOUS at 09:24

## 2018-12-17 RX ADMIN — SODIUM CHLORIDE: 9 INJECTION, SOLUTION INTRAVENOUS at 10:39

## 2018-12-17 RX ADMIN — CIPROFLOXACIN 400 MG: 2 INJECTION, SOLUTION INTRAVENOUS at 06:09

## 2018-12-17 RX ADMIN — METRONIDAZOLE 500 MG: 500 INJECTION, SOLUTION INTRAVENOUS at 16:40

## 2018-12-17 RX ADMIN — OXYCODONE HYDROCHLORIDE AND ACETAMINOPHEN 1 TABLET: 5; 325 TABLET ORAL at 12:41

## 2018-12-17 RX ADMIN — SODIUM CHLORIDE: 9 INJECTION, SOLUTION INTRAVENOUS at 20:51

## 2018-12-17 RX ADMIN — CIPROFLOXACIN 400 MG: 2 INJECTION, SOLUTION INTRAVENOUS at 18:10

## 2018-12-17 ASSESSMENT — MIFFLIN-ST. JEOR: SCORE: 1464.86

## 2018-12-17 ASSESSMENT — ACTIVITIES OF DAILY LIVING (ADL)
ADLS_ACUITY_SCORE: 11

## 2018-12-17 NOTE — PLAN OF CARE
Pt up independently. Pt did complain of pain with increase in pain and medicated with Percocet.  Pt states decrease in pain. Pt remains NPO with small sips with water. Pt having some small brown loose stools.

## 2018-12-17 NOTE — PLAN OF CARE
Pt is A/Ox4. VSS on RA, except soft BP. Denies pain. Independent. LS clear. No SOB reported. Denies chest pain. +BS, denies abdominal pain. No nausea overnight. NPO. Surgery consulted. Stool sample sent for c.Diff rule out, awaiting results. Enteric isolation maintained. IVF infusing at 125 ml/hr. IV Flagyl and Cipro given overnight. Nursing continue to monitor.

## 2018-12-17 NOTE — PLAN OF CARE
Pt A&Ox4, up independently. NPO, using swabs for dry mouth. Denies N/V. VSS on RA, denies abdominal pain. States some discomfort but refused intervention. PIV infusing NS @ 125ml/hr, intermittent abx. Enteric precautions to r/o C diff, awaiting sample. LS diminished. BS hyperactive (+) flatus, pebble sized amount of loose stool. WBC  12.4, Creatinine 1.63, surgery consulted. Discharge date pending.

## 2018-12-17 NOTE — PROGRESS NOTES
[facility]    Hospitalist Progress Note    Assessment & Plan   A 63-year-old male with history of diverticulitis, prostate cancer, status post prostatectomy, hypertension presenting with:     Colitis with small-bowel obstruction.    The patient has been having diarrhea for the last 2 weeks or so intermittently and he started having nausea, vomiting and abdominal pain persisting since 12/05. Started Augmentin in outpatient setting with worsened symptoms. CT A/P on admission with dilated small bowel loops c/w obstruction as well as sigmoid and rectosigmoid wall thickening suggestive of colitis with diverticulitis.  - Continues on IV Cipro/Flagyl  - NPO, IVF  - Surgery consulted, following, anticipate transition to PO intake in AM  - Continues with PRN antiemetics  - Pain control with PRN dilaudid, oxycodone    Acute kidney injury   Cr 1.63 on admission, elevated from baseline of 0.8. Due to dehydration in the setting of the small-bowel obstruction and colitis contributing.  - Holding PTA losartan/HCTZ  - Continues on IVF while NPO  - Monitor BMP    Hypertension  PTA regimen consists of losartan/HCTZ 100/25mg daily. BP controlled presently.  - Holding regimen in setting of MAG      DVT Prophylaxis: Pneumatic Compression Devices  Code Status: Full Code    Disposition: Expected discharge in >2 days pending pain control, ability to tolerate oral intake, transition to PO antibiotics.    Derick Jansen PA-C    Interval History   Pt seen & evaluated. Resting in bed with RN at bedside. Reports he recently had some worsened pain up to 6/18, recently received oxycodone with improvement to current rate of 2/10. Denies nausea, vomiting. Is passing flatus. Bedside RN without concerns. Pt hopeful to start PO intake in AM.    -Data reviewed today: I reviewed all new labs and imaging results over the last 24 hours. I personally reviewed no images or EKG's today.    Physical Exam   Temp: 97.9  F (36.6  C) Temp src:  Oral BP: 118/65 Pulse: 78 Heart Rate: 71 Resp: 16 SpO2: 95 % O2 Device: None (Room air)    Vitals:    12/16/18 1325 12/17/18 0608   Weight: 73.9 kg (163 lb) 73.2 kg (161 lb 6.4 oz)     Vital Signs with Ranges  Temp:  [97.9  F (36.6  C)-99.2  F (37.3  C)] 97.9  F (36.6  C)  Pulse:  [74-78] 78  Heart Rate:  [52-77] 71  Resp:  [16-18] 16  BP: (102-120)/(63-79) 118/65  SpO2:  [94 %-97 %] 95 %  I/O last 3 completed shifts:  In: 2723 [P.O.:20; I.V.:2703]  Out: 1590 [Urine:1550; Stool:40]    GEN: well-developed, well-nourished, appears comfortable  PULM: lungs CTA bilaterally, no increased work of breathing, no wheeze, crackles, rhonchi  CV: RRR, S1 & S2, no murmur  GI: soft, nontender, hyperactive BS located diffusely, no guarding or rigidity  SKIN: warm & dry without rash, no pedal edema    Medications     sodium chloride 125 mL/hr at 12/17/18 1039       ciprofloxacin  400 mg Intravenous Q12H     metroNIDAZOLE  500 mg Intravenous Q8H     pantoprazole (PROTONIX) IV  40 mg Intravenous Daily with breakfast       Data   Recent Labs   Lab 12/17/18  0910 12/16/18  1345   WBC 9.3 12.4*   HGB 13.3 14.2   MCV 97 96    298    138   POTASSIUM 3.9 3.8   CHLORIDE 106 101   CO2 25 25   BUN 19 30   CR 1.06 1.63*   ANIONGAP 9 12   HARDY 8.6 9.8   GLC 96 109*   ALBUMIN  --  3.5   PROTTOTAL  --  8.1   BILITOTAL  --  0.8   ALKPHOS  --  65   ALT  --  17   AST  --  14   LIPASE  --  100       No results found for this or any previous visit (from the past 24 hour(s)).

## 2018-12-17 NOTE — CONSULTS
General Surgery Consultation    Juancarlos Ma MRN#: [id number]   Age: 63 year old YOB: 1955     Date of Admission:          12/16/2018  Reason for consult: Colitis, small bowel obstruction   Surgeon:      Guido Jean MD   Requesting provider:     Izabela Bustos MD    We are asked by Dr. Bustos to see this patient in consultation for small bowel obstruction and colitis.        Chief Complaint:   Abdominal pain         History of Present Illness:   This patient is a 63 year old male with a history of diverticulitis and c diff infection who has had abdominal pain and irregular/loose stools for 12 days. He was seen by primary care on 12/11 - an XR was unremarkable and he was given Augmentin for diverticulitis. Since starting the augmentin his abdominal pain has worsened and he continues to have diarrhea, this prompted him to present to the [facility] ED yesterday. In the ED a CT scan was performed which revealed colonic diverticulosis, sigmoid and rectosigmoid wall thickening with a length of involvement more suggestive of colitis than diverticulitis. CT also showed dilated small bowel loops and decompressed distal small bowel loops, no free air. Labs included a white count of 12.4, absolute neutrophil 9.7, hemoglobin 14.2, creatinine 1.63.Total bili, ALT, AST, alk phos, lipase all normal.    Since being in the hospital the patient's pain has significantly improved. He reports minimal pain, is declining any pain meds. The pain the past 12 days has been across his lower abdomen, but currently he feels very comfortable. Vomited last night. Appetite has fluctuated, currently feels hungry. Denies fever, chills, nausea. No recent travel, no sick contacts.   Of note, in 2014 he was admitted to [facility] with diverticulitis and microperforation, positive for c diff during that admission.     History is obtained from the patient and chart. Last ate yesterday. Surgical history includes robotic lap retropubic prostatectomy with  Dr. Jarrell in .         Past Medical History:   Diverticulosis of colon (10/2/12)  Essential hypertension, benign  Fracture of fifth metacarpal bone of left hand (2017)  Prostate cancer (H)  Serrated adenoma of colon (10/2/12).          Past Surgical History:     Past Surgical History:   Procedure Laterality Date     DAVINCI PROSTATECTOMY  6/10/2013    Procedure: DAVINCI PROSTATECTOMY;  ROBOTIC ASSIST PROSTATECTOMY;  Surgeon: Damon Jarrell MD;  Location:  OR      TOOTH EXTRACTION W/FORCEP  1974    4 wisdom teeth removed without complication            Medications:     Prior to Admission medications    Medication Sig Start Date End Date Taking? Authorizing Provider   amoxicillin-clavulanate (AUGMENTIN) 875-125 MG tablet Take 1 tablet by mouth 3 times daily For 10 days. Day 1 was 2018 - patient had one dose on that day.   Yes Unknown, Entered By History   aspirin 81 MG EC tablet Take 81 mg by mouth daily   Yes Unknown, Entered By History   diphenhydrAMINE (BENADRYL) 25 MG tablet Take 25 mg by mouth every 8 hours as needed for itching or allergies   Yes Unknown, Entered By History   losartan-hydrochlorothiazide (HYZAAR) 100-25 MG per tablet Take 1 tablet by mouth every morning 18  Yes Wili Arevalo MD            Allergies:     Allergies   Allergen Reactions     Lisinopril      Cough with Lisinopril     Penicillin [Penicillins] Hives            Social History:     Social History     Tobacco Use     Smoking status: Former Smoker     Packs/day: 0.50     Years: 40.00     Pack years: 20.00     Types: Cigarettes     Last attempt to quit: 2014     Years since quittin.0     Smokeless tobacco: Never Used     Tobacco comment: quit    Substance Use Topics     Alcohol use: Yes     Alcohol/week: 10.0 oz     Types: 20 Cans of beer per week     Comment: daily             Family History:   POSITIVE for hypertension, diabetes, lymphoma, COPD, scoliosis.          Review of  "Systems:   Brief ROS is negative other than noted in the HPI.  Constitutional: NEGATIVE for fever, chills, POSITIVE For 5-lb weight loss  Respiratory: NEGATIVE for significant cough or SOB  Cardiovascular: NEGATIVE for chest pain, palpitations or peripheral edema  Gastrointestinal: POSITIVE for abdominal pain, vomiting, diarrhea  Hematologic: NEGATIVE for bleeding problems         Physical Exam:   Blood pressure 106/63, pulse 74, temperature 98.1  F (36.7  C), temperature source Oral, resp. rate 16, height 1.676 m (5' 6\"), weight 73.2 kg (161 lb 6.4 oz), SpO2 94 %.  I/O last 3 completed shifts:  In: 1700 [I.V.:1700]  Out: 1050 [Urine:1050]    General - This is a very pleasant, well-developed, well-nourished male in no apparent distress. Alert and oriented x 3. Converses normally  Head - normocephalic, atraumatic, normal hair distribution  Eyes - pupils equally round and reactive to light, no scleral icterus, extraocular movements intact  Neck - supple without masses, trachea midline, no lymphadenopathy or thyromegaly  Respiratory: lungs clear to auscultation bilaterally without wheezes, rales or rhonchi   Cardiovascular: regular rate and rhythm; S1 and S2 distinct without murmur   Abdomen - Soft, nontender, nondistended with + bowel sounds. No peritoneal signs, no guarding  Extremities - Moves all extremities. Warm without edema. No calf tenderness  Neurologic - Nonfocal          Data:     Labs:  Recent Labs   Lab Test 12/16/18  1345 02/26/18  0811 02/24/17  0919   WBC 12.4* 5.6 6.2   HGB 14.2 15.5 15.5   HCT 40.8 46.8 47.3    212 224     Recent Labs   Lab Test 12/16/18  1345 02/26/18  0811 02/24/17  0919   POTASSIUM 3.8 4.2 4.3   CHLORIDE 101 103 104   CO2 25 28 31   BUN 30 11 14   CR 1.63* 0.88 1.01     Recent Labs   Lab Test 12/16/18  1345 02/03/16  0928 11/11/15  1129  02/06/14  1745   BILITOTAL 0.8 0.5 1.1   < > 0.9   ALT 17 23 20   < > 36   AST 14 17 13   < > 25   ALKPHOS 65 73 80   < > 61   LIPASE 100 " 139  --   --  105    < > = values in this interval not displayed.     Recent Labs   Lab Test 12/16/18  1345 02/26/18  0811 02/24/17  0919   HARDY 9.8 9.5 9.6     Recent Labs   Lab Test 12/16/18  1515 12/16/18  1345 02/26/18  0811 02/24/17  0919 02/03/16  1039 02/03/16  0928 11/11/15  1133 11/11/15  1129   ANIONGAP  --  12 5 6  --  9  --  8   PROTEIN 10*  --   --   --  Negative  --  Negative  --    ALBUMIN  --  3.5  --   --   --  3.8  --  4.0       CT scan of the abdomen:  FINDINGS:  Colonic diverticulosis. There appears to be sigmoid and  rectosigmoid wall thickening. The length of involvement is more  suggestive of colitis than diverticulitis. There are dilated small  bowel loops and decompressed distal small bowel loops, consistent with  obstruction. Unremarkable appendix. Minimal free fluid in the lower  pelvis.                                                                      IMPRESSION:  1. Small bowel obstruction.  2. Suspected sigmoid colitis.           Assessment:   Juancarlos Ma is a 63 year old male with colitis and associated small bowel obstruction/ileus. Benign abdominal exam. Possible sigmoid diverticulitis, but CT findings more consistent with colitis. No evidence of colonic perforation.         Plan:   - Small bowel obstruction vs ileus likely reactive in setting of colitis. Continue conservative management. No plans for surgical intervention at this time  - Continue bowel rest. NPO, IV fluids, IV anti-emetics prn, scheduled IV PPI. May be able to trial clear liquids later today if patient continues to do well  - If patient develops nausea, vomiting, worsening pain would recommend NG tube placement but currently not needed  - Continue IV cipro + flagyl for colitis/possible diverticulitis. Stool studies pending - does have hx of c diff in 2014  - Medical management per hospitalist. Recommend monitoring for sx of alcohol withdrawal given patient's history of daily etoh use.   - We will continue to  follow along. Thank you very much for this consultation       I have discussed the history, physical, and plan with Dr. Jean who will independently interview and examine the patient and update the plan as appropriate.        Gaby Lopez PA-C  [facility]  [phone]

## 2018-12-17 NOTE — H&P
Pt's H&P note dictated  63 yr old male with h/o diverticulitis and HTN, prostate cancer started having abdominal pain on Dec 5th started on oral augmentin for possible diverticulitis by clinic presented today with nausea, vomiting twice overnight with ongoing diarrhoea. CT showed colitis with SBO:  NPO  IVF  Cipro, flagyl for colitis  Check C.diff, stool cultures  Hold off on BG for now if becomes nauseous will place NG  General surgery consult     Izabela Bustos MD

## 2018-12-17 NOTE — H&P
Admitted:     12/16/2018      PRIMARY CARE PHYSICIAN:  Wili Arevalo MD.      CHIEF COMPLAINT:  Abdominal pain, diarrhea, nausea and vomiting.      HISTORY OF PRESENT ILLNESS:  Mr. Juancarlos Ma is a 63-year-old male with history of diverticulitis and history of prostate cancer status post prostatectomy and hypertension who presented to the emergency room with complaints of abdominal pain, mainly lower abdominal pain and back pain, started on 12/05, persisted since then and 12/11 he went to see a primary care provider in the clinic and an x-ray of the abdomen was taken and he was started on Augmentin oral t.i.d.  The patient does have history of ALLERGY TO PENICILLIN, so was told to take Benadryl before the Augmentin.  Since he started taking the Augmentin on the 11th, he started feeling more sick with doubling over in pain, so he stopped taking the Augmentin last night.  Since he has been having this abdominal pain, he has been having some diarrhea with loose stools, but no formed bowel movements, but he is passing gas and is only having minimal intake associated with chills, no fever per se.  He vomited twice last night, so his sister brought him into the emergency room today.  In the emergency room, his creatinine was found to be elevated at 1.63 with baseline creatinine of 0.8 to 1.  CT scan of the abdomen and pelvis with no contrast was performed and it showed colonic diverticulitis.  There appeared to be sigmoid and rectosigmoid wall thickening.  The length of involvement is more suggestive of colitis and diverticulitis.  There are dilated small bowel loops and decompressed distal small bowel loops consistent with bowel obstruction.  Unremarkable appendix.  Minimal free fluid in the lower pelvis.  So request to hospitalization was made for colitis with a small-bowel obstruction.      PAST MEDICAL HISTORY:   1.  History of diverticulitis, last episode was 5 years ago.   2.  Hypertension.   3.  History of  prostate cancer, status post prostate removal, in remission.      ALLERGIES:  ALLERGIC TO PENICILLIN AND LISINOPRIL, EVEN THOUGH HE TOLERATED AUGMENTIN OKAY.      SOCIAL HISTORY:  He was a lifelong smoker, was smoking on and off since he was 11 years of age.  Quit smoking 1 month ago.  He drinks alcohol, 8 beers per day, but his last drink was 5 days ago.  No signs of alcohol withdrawal.  As per the patient, he is single.  He is a .  He lives in [place].      FAMILY HISTORY:  Mother had lung cancer and COPD and father also had cancer.  One of his brothers  of stage IV colon cancer.      REVIEW OF SYSTEMS:  Ten-point review of systems was done and is negative except as in HPI.      PHYSICAL EXAMINATION:   VITAL SIGNS:  His temperature is 97.2 degrees Fahrenheit, pulse rate is 84, blood pressure is 105/62, respiratory rate of 16.  He is satting 97% on room air.   GENERAL:  He is alert, oriented, pleasant male sitting comfortably in bed, not in any acute distress.   HEENT:  Atraumatic, normocephalic.   NECK:  Supple.   HEART:  S1, S2 heard, no murmurs, rubs or gallops.   LUNGS:  Clear to auscultation.  No rales, rhonchi or wheezing.   ABDOMEN:  Soft, nontender currently.  Bowel sounds positive.  No guarding, rigidity or rebound.   EXTREMITIES:  No clubbing, cyanosis or edema.   PSYCHIATRIC:  Mood and affect are normal.   NEUROLOGIC:  No focal weakness.   SKIN:  Warm and dry.      LABORATORY AND RADIOLOGICAL INVESTIGATIONS:  As dictated above.  WBC count is elevated at 12.4, hemoglobin 14.2, platelet count at 298.  CT abdomen showed colitis with small-bowel obstruction.  Sodium 138, potassium 3.8, chloride 101, bicarbonate 25, BUN 30, creatinine 1.63 with baseline creatinine of 0.8 to 1.  All of his LFTs are normal.      ASSESSMENT AND PLAN:  A 63-year-old male with history of diverticulitis, prostate cancer, status post prostatectomy, hypertension presenting with:   1.  Colitis with small-bowel  obstruction.  The patient has been having diarrhea for the last 2 weeks or so intermittently and he started having nausea, vomiting and abdominal pain persisting since .  He was taking Augmentin 2 to 3 times a day.  As per the patient, it made him ill, so he will be admitted to the hospital and we will make him n.p.o. except for ice chips and supportive treatment with IV fluids, analgesia.  Antiemetics will be provided and will treat him with IV Cipro and Flagyl for the colitis and will send stool cultures and stool for C. diff assay with the diarrhea and will request a General Surgery consultation for further evaluation of the small-bowel obstruction.   2.  Acute kidney injury due to dehydration in the setting of the small-bowel obstruction and colitis contributing, so we will hold his losartan/hydrochlorothiazide due to the elevated creatinine and avoid any NSAIDs or other nephrotoxins and hydrate him with IV fluids and follow BMP closely.    3.  Deep vein thrombosis prophylaxis with PCDs and ambulation.   3.  GI prophylaxis with IV Protonix.      He will be admitted as inpatient as I am anticipating more than 2 nights stay.      CODE STATUS:  Full code.         EDUARDO CARO MD             D: 2018   T: 2018   MT: MANOJ      Name:     LIVIA SEAY   MRN:      [id number]        Account:      [id number]   :      1955        Admitted:     2018                   Document: [id number]       cc: Wili Arevalo MD

## 2018-12-18 LAB
ANION GAP SERPL CALCULATED.3IONS-SCNC: 8 MMOL/L (ref 3–14)
BUN SERPL-MCNC: 15 MG/DL (ref 7–30)
CALCIUM SERPL-MCNC: 8 MG/DL (ref 8.5–10.1)
CHLORIDE SERPL-SCNC: 109 MMOL/L (ref 94–109)
CO2 SERPL-SCNC: 24 MMOL/L (ref 20–32)
CREAT SERPL-MCNC: 0.9 MG/DL (ref 0.66–1.25)
ERYTHROCYTE [DISTWIDTH] IN BLOOD BY AUTOMATED COUNT: 12.3 % (ref 10–15)
GFR SERPL CREATININE-BSD FRML MDRD: 84 ML/MIN/1.7M2
GLUCOSE SERPL-MCNC: 85 MG/DL (ref 70–99)
HCT VFR BLD AUTO: 35 % (ref 40–53)
HGB BLD-MCNC: 11.8 G/DL (ref 13.3–17.7)
MCH RBC QN AUTO: 32.2 PG (ref 26.5–33)
MCHC RBC AUTO-ENTMCNC: 33.7 G/DL (ref 31.5–36.5)
MCV RBC AUTO: 96 FL (ref 78–100)
PLATELET # BLD AUTO: 269 10E9/L (ref 150–450)
POTASSIUM SERPL-SCNC: 3.5 MMOL/L (ref 3.4–5.3)
RBC # BLD AUTO: 3.66 10E12/L (ref 4.4–5.9)
SODIUM SERPL-SCNC: 141 MMOL/L (ref 133–144)
WBC # BLD AUTO: 6.8 10E9/L (ref 4–11)

## 2018-12-18 PROCEDURE — 80048 BASIC METABOLIC PNL TOTAL CA: CPT | Performed by: INTERNAL MEDICINE

## 2018-12-18 PROCEDURE — 25000132 ZZH RX MED GY IP 250 OP 250 PS 637: Performed by: INTERNAL MEDICINE

## 2018-12-18 PROCEDURE — 99231 SBSQ HOSP IP/OBS SF/LOW 25: CPT | Performed by: PHYSICIAN ASSISTANT

## 2018-12-18 PROCEDURE — 85027 COMPLETE CBC AUTOMATED: CPT | Performed by: INTERNAL MEDICINE

## 2018-12-18 PROCEDURE — 25000128 H RX IP 250 OP 636: Performed by: INTERNAL MEDICINE

## 2018-12-18 PROCEDURE — 36415 COLL VENOUS BLD VENIPUNCTURE: CPT | Performed by: INTERNAL MEDICINE

## 2018-12-18 PROCEDURE — 12000000 ZZH R&B MED SURG/OB

## 2018-12-18 PROCEDURE — C9113 INJ PANTOPRAZOLE SODIUM, VIA: HCPCS | Performed by: INTERNAL MEDICINE

## 2018-12-18 RX ORDER — SIMETHICONE 80 MG
80 TABLET,CHEWABLE ORAL EVERY 6 HOURS PRN
Status: DISCONTINUED | OUTPATIENT
Start: 2018-12-18 | End: 2018-12-19 | Stop reason: HOSPADM

## 2018-12-18 RX ADMIN — SODIUM CHLORIDE: 9 INJECTION, SOLUTION INTRAVENOUS at 17:49

## 2018-12-18 RX ADMIN — METRONIDAZOLE 500 MG: 500 INJECTION, SOLUTION INTRAVENOUS at 15:52

## 2018-12-18 RX ADMIN — PANTOPRAZOLE SODIUM 40 MG: 40 INJECTION, POWDER, FOR SOLUTION INTRAVENOUS at 08:02

## 2018-12-18 RX ADMIN — METRONIDAZOLE 500 MG: 500 INJECTION, SOLUTION INTRAVENOUS at 00:34

## 2018-12-18 RX ADMIN — CIPROFLOXACIN 400 MG: 2 INJECTION, SOLUTION INTRAVENOUS at 17:48

## 2018-12-18 RX ADMIN — SODIUM CHLORIDE: 9 INJECTION, SOLUTION INTRAVENOUS at 05:59

## 2018-12-18 RX ADMIN — Medication 1 MG: at 21:33

## 2018-12-18 RX ADMIN — Medication 1 MG: at 00:40

## 2018-12-18 RX ADMIN — CIPROFLOXACIN 400 MG: 2 INJECTION, SOLUTION INTRAVENOUS at 05:59

## 2018-12-18 RX ADMIN — METRONIDAZOLE 500 MG: 500 INJECTION, SOLUTION INTRAVENOUS at 08:02

## 2018-12-18 RX ADMIN — OXYCODONE HYDROCHLORIDE AND ACETAMINOPHEN 1 TABLET: 5; 325 TABLET ORAL at 21:33

## 2018-12-18 ASSESSMENT — ACTIVITIES OF DAILY LIVING (ADL)
ADLS_ACUITY_SCORE: 11

## 2018-12-18 NOTE — PLAN OF CARE
A&Ox4. VSS on RA. Up independently, ambulates in halls. Diet advanced to clears, tolerating well, no increase in pain, nausea. IVF infusing; continues on IV abx. LS clear. Denies pain. BS hypoactive; passing gas; BM x1 today. Surgery following. Nursing will continue to monitor.

## 2018-12-18 NOTE — PLAN OF CARE
A&Ox4. VSS on RA. Denies pain or nausea. Reports passing gas. Independent. NS infusing at 125 mL/hr. NPO except meds. PRN Melatonin given per pt request, effective. Discharge pending clinical improvement. Nursing will continue to monitor.

## 2018-12-18 NOTE — PROGRESS NOTES
[facility]    Hospitalist Progress Note    Assessment & Plan   A 63-year-old male with history of diverticulitis, prostate cancer, status post prostatectomy, hypertension presenting with:      Colitis with small-bowel obstruction.    The patient has been having diarrhea for the last 2 weeks or so intermittently and he started having nausea, vomiting and abdominal pain persisting since 12/05. Started Augmentin in outpatient setting with worsened symptoms. CT A/P on admission with dilated small bowel loops c/w obstruction as well as sigmoid and rectosigmoid wall thickening suggestive of colitis with diverticulitis.  - Continues on IV Cipro/Flagyl  - ADAT, titrate IVF  - Surgery consulted, following  - Continues with PRN antiemetics  - Pain control with PRN dilaudid, oxycodone     Acute kidney injury   Cr 1.63 on admission, elevated from baseline of 0.8. Due to dehydration in the setting of the small-bowel obstruction and colitis contributing.  - Cr 1.63--1.06--0.9  - Holding PTA losartan/HCTZ  - Continues on IVF while NPO     Hypertension  PTA regimen consists of losartan/HCTZ 100/25mg daily. BP controlled presently.  - Holding regimen in setting of MAG    DVT Prophylaxis: Ambulate every shift  Code Status: Full Code    Disposition: Expected discharge tomorrow pending ability to tolerate advanced diet, pain controlled.    Derick Jansen PA-C    Interval History   Pt seen & evaluated. Ambulating in halls on arrival, more comfortable today. Has minimally required oxycodone for discomfort. Advanced to clear liquid diet this morning and has tolerated, hopeful to discharge in AM. Discouraged regarding recommendation for two weeks of Flagyl for treatment, agreeable after discussion regarding indications. No issues per bedside RN.    -Data reviewed today: I reviewed all new labs and imaging results over the last 24 hours. I personally reviewed no images or EKG's today.    Physical Exam   Temp: 98.1  F (36.7   C) Temp src: Oral BP: 123/75   Heart Rate: 64 Resp: 16 SpO2: 97 % O2 Device: None (Room air)    Vitals:    12/16/18 1325 12/17/18 0608   Weight: 73.9 kg (163 lb) 73.2 kg (161 lb 6.4 oz)     Vital Signs with Ranges  Temp:  [97.9  F (36.6  C)-98.1  F (36.7  C)] 98.1  F (36.7  C)  Heart Rate:  [59-75] 64  Resp:  [16] 16  BP: (110-123)/(65-75) 123/75  SpO2:  [92 %-97 %] 97 %  I/O last 3 completed shifts:  In: 2017 [I.V.:2017]  Out: 1235 [Urine:1175; Stool:60]    GEN: well-developed, well-nourished, appears comfortable  PULM: lungs CTA bilaterally, no increased work of breathing, no wheeze, crackles, rhonchi  CV: RRR, S1 & S2, no murmur  GI: soft, nontender, nondistended, +BS in all 4 quadrants  SKIN: warm & dry without rash or wound, no pedal edema    Medications     sodium chloride 125 mL/hr at 12/18/18 0559       ciprofloxacin  400 mg Intravenous Q12H     metroNIDAZOLE  500 mg Intravenous Q8H     pantoprazole (PROTONIX) IV  40 mg Intravenous Daily with breakfast       Data   Recent Labs   Lab 12/18/18  0909 12/17/18  0910 12/16/18  1345   WBC 6.8 9.3 12.4*   HGB 11.8* 13.3 14.2   MCV 96 97 96    303 298    140 138   POTASSIUM 3.5 3.9 3.8   CHLORIDE 109 106 101   CO2 24 25 25   BUN 15 19 30   CR 0.90 1.06 1.63*   ANIONGAP 8 9 12   HARDY 8.0* 8.6 9.8   GLC 85 96 109*   ALBUMIN  --   --  3.5   PROTTOTAL  --   --  8.1   BILITOTAL  --   --  0.8   ALKPHOS  --   --  65   ALT  --   --  17   AST  --   --  14   LIPASE  --   --  100       No results found for this or any previous visit (from the past 24 hour(s)).

## 2018-12-18 NOTE — PLAN OF CARE
Pt is A/Ox4. VSS on RA. Denies pain most of the shift, but 6/10 at hc, percocet given. Independent. LS clear. No SOB reported. Denies chest pain. +BS, passing min gas. No nausea. NPO. Surgery following. Neg c.Diff  and stool panel. IVF infusing at 125 ml/hr. IV Flagyl and Cipro. Nursing continue to monitor.

## 2018-12-18 NOTE — PROGRESS NOTES
"General Surgery  Admission Date: 12/16/2018  Today's Date: 12/18/2018      SUBJECTIVE  Mild lower abdominal pain yesterday, relieved with percocet. Pt reports minimal pain, no nausea. Continues to pass gas. Had 2 BMs yesterday, diarrhea seems to have slowed. Feels hungry.        OBJECTIVE  /68 (BP Location: Right arm)   Pulse 78   Temp 98.1  F (36.7  C) (Oral)   Resp 16   Ht 1.676 m (5' 6\")   Wt 73.2 kg (161 lb 6.4 oz)   SpO2 92%   BMI 26.05 kg/m    I/O last 3 completed shifts:  In: 3040 [P.O.:20; I.V.:3020]  Out: 915 [Urine:875; Stool:40]  General: awake, alert, NAD, oriented x 3  Respiratory: non-labored breathing  Abdomen: soft, nondistended, mild tenderness to palpation lower abdomen L>R, + bowel sounds        ASSESSMENT/PLAN  Juancarlos Ma is a 63 year old male with colitis and associated small bowel obstruction/ileus. Benign abdominal exam. Possible sigmoid diverticulitis, but CT findings more consistent with colitis. No evidence of colonic perforation.  - Trial of clear liquids this morning. If he tolerates well can advance to full liquids later today  - Encourage ambulation  - Anti-emetics prn. If develops nausea, worsening pain or distention resume NPO status  - Continue IV cipro + flagyl for colitis/possible diverticulitis. Stool studies negative. Will transition to PO antibiotics at discharge and complete full course for diverticulitis  - Medical management per hospitalist. No plans for surgery at this time. We will continue to follow along. Possible discharge tomorrow            Gaby Lopez PA-C  Office: [phone]  "

## 2018-12-19 VITALS
DIASTOLIC BLOOD PRESSURE: 61 MMHG | WEIGHT: 161.4 LBS | TEMPERATURE: 97.8 F | BODY MASS INDEX: 25.94 KG/M2 | HEART RATE: 69 BPM | SYSTOLIC BLOOD PRESSURE: 102 MMHG | RESPIRATION RATE: 18 BRPM | OXYGEN SATURATION: 97 % | HEIGHT: 66 IN

## 2018-12-19 PROCEDURE — 25000128 H RX IP 250 OP 636: Performed by: INTERNAL MEDICINE

## 2018-12-19 PROCEDURE — C9113 INJ PANTOPRAZOLE SODIUM, VIA: HCPCS | Performed by: INTERNAL MEDICINE

## 2018-12-19 PROCEDURE — 99238 HOSP IP/OBS DSCHRG MGMT 30/<: CPT | Performed by: PHYSICIAN ASSISTANT

## 2018-12-19 RX ORDER — OXYCODONE AND ACETAMINOPHEN 5; 325 MG/1; MG/1
1 TABLET ORAL EVERY 4 HOURS PRN
Qty: 8 TABLET | Refills: 0 | Status: ON HOLD | OUTPATIENT
Start: 2018-12-19 | End: 2019-06-18

## 2018-12-19 RX ORDER — AMOXICILLIN 250 MG
1-2 CAPSULE ORAL 2 TIMES DAILY PRN
Qty: 30 TABLET | Refills: 1 | Status: ON HOLD | OUTPATIENT
Start: 2018-12-19 | End: 2019-06-18

## 2018-12-19 RX ORDER — METRONIDAZOLE 500 MG/1
500 TABLET ORAL 3 TIMES DAILY
Qty: 21 TABLET | Refills: 0 | Status: ON HOLD | OUTPATIENT
Start: 2018-12-19 | End: 2019-06-18

## 2018-12-19 RX ORDER — CIPROFLOXACIN 500 MG/1
500 TABLET, FILM COATED ORAL 2 TIMES DAILY
Qty: 14 TABLET | Refills: 0 | Status: ON HOLD | OUTPATIENT
Start: 2018-12-19 | End: 2019-06-18

## 2018-12-19 RX ADMIN — METRONIDAZOLE 500 MG: 500 INJECTION, SOLUTION INTRAVENOUS at 00:02

## 2018-12-19 RX ADMIN — METRONIDAZOLE 500 MG: 500 INJECTION, SOLUTION INTRAVENOUS at 08:07

## 2018-12-19 RX ADMIN — SODIUM CHLORIDE: 9 INJECTION, SOLUTION INTRAVENOUS at 05:13

## 2018-12-19 RX ADMIN — PANTOPRAZOLE SODIUM 40 MG: 40 INJECTION, POWDER, FOR SOLUTION INTRAVENOUS at 08:06

## 2018-12-19 RX ADMIN — CIPROFLOXACIN 400 MG: 2 INJECTION, SOLUTION INTRAVENOUS at 05:13

## 2018-12-19 ASSESSMENT — ACTIVITIES OF DAILY LIVING (ADL)
ADLS_ACUITY_SCORE: 11

## 2018-12-19 NOTE — PLAN OF CARE
[id number]. Pt is A/OX4, VSS on RA, denied pain at time of assessment, denies N/V.  Tolerated clears adv to full liquids.  BS hypoactive, +flatus, abd slightly distended/soft, voiding adequately.  Up ind.  Surgery following.

## 2018-12-19 NOTE — DISCHARGE SUMMARY
[facility]    Discharge Summary  Hospitalist    Date of Admission:  12/16/2018  Date of Discharge:  12/19/2018  Discharging Provider: Derick Jansen PA-C    Discharge Diagnoses      Small bowel obstruction (H)  MAG (acute kidney injury) (H)  Diverticulitis of sigmoid colon    History of Present Illness   Juancarlos Ma is an 63 year old male who presented with abdominal pain, identified to have colitis/diverticulitis with a small bowel obstruction.    HPI from admission H&P:  Mr. Juancarlos Ma is a 63-year-old male with history of diverticulitis and history of prostate cancer status post prostatectomy and hypertension who presented to the emergency room with complaints of abdominal pain, mainly lower abdominal pain and back pain, started on 12/05, persisted since then and 12/11 he went to see a primary care provider in the clinic and an x-ray of the abdomen was taken and he was started on Augmentin oral t.i.d.  The patient does have history of ALLERGY TO PENICILLIN, so was told to take Benadryl before the Augmentin.  Since he started taking the Augmentin on the 11th, he started feeling more sick with doubling over in pain, so he stopped taking the Augmentin last night.  Since he has been having this abdominal pain, he has been having some diarrhea with loose stools, but no formed bowel movements, but he is passing gas and is only having minimal intake associated with chills, no fever per se.  He vomited twice last night, so his sister brought him into the emergency room today.  In the emergency room, his creatinine was found to be elevated at 1.63 with baseline creatinine of 0.8 to 1.  CT scan of the abdomen and pelvis with no contrast was performed and it showed colonic diverticulitis.  There appeared to be sigmoid and rectosigmoid wall thickening.  The length of involvement is more suggestive of colitis and diverticulitis.  There are dilated small bowel loops and decompressed distal small bowel loops  consistent with bowel obstruction.  Unremarkable appendix.  Minimal free fluid in the lower pelvis.  So request to hospitalization was made for colitis with a small-bowel obstruction.     Hospital Course   Juancarlos Ma was admitted on 12/16/2018.  The following problems were addressed during his hospitalization:    Colitis with small-bowel obstruction.    The patient has been having diarrhea for the last 2 weeks or so intermittently and he started having nausea, vomiting and abdominal pain persisting since 12/05. Started Augmentin in outpatient setting with worsened symptoms. CT A/P on admission with dilated small bowel loops c/w obstruction as well as sigmoid and rectosigmoid wall thickening suggestive of colitis with diverticulitis. Pt was seen in consultation with General Surgery, managed with bowel rest and IV Cipro/Flagyl with improvement. He was able to advance to a low-fiber diet and felt comfortable discharging home. He will continue on Cipro/Flagyl for 10 days following discharge. He will follow-up with Surgery as an outpatient, appointment scheduled.     Acute kidney injury, resolved   Cr 1.63 on admission, elevated from baseline of 0.8. Due to dehydration in the setting of the small-bowel obstruction and colitis contributing. Improved with IV fluids, he may resume PTA losartan/HCTZ at discharge.     Hypertension  PTA regimen consists of losartan/HCTZ 100/25mg daily.     Derick Jansen PA-C    Significant Results and Procedures   As noted    Pending Results   These results will be followed up by n/a  Unresulted Labs Ordered in the Past 30 Days of this Admission     No orders found from 10/17/2018 to 12/17/2018.          Code Status   Full Code       Primary Care Physician   Wili Arevalo    Physical Exam   Temp: 98.5  F (36.9  C) Temp src: Oral BP: 115/66   Heart Rate: 66 Resp: 18 SpO2: 94 % O2 Device: None (Room air)    Vitals:    12/16/18 1325 12/17/18 0608   Weight: 73.9 kg (163 lb) 73.2 kg  (161 lb 6.4 oz)     Vital Signs with Ranges  Temp:  [97.8  F (36.6  C)-98.5  F (36.9  C)] 98.5  F (36.9  C)  Heart Rate:  [55-67] 66  Resp:  [16-18] 18  BP: (100-142)/(60-81) 115/66  SpO2:  [90 %-98 %] 94 %  I/O last 3 completed shifts:  In: 1330 [I.V.:1330]  Out: 1810 [Urine:1700; Stool:110]    GEN: well-developed, well-nourished, appears comfortable  PULM: lungs CTA bilaterally, no increased work of breathing, no wheeze, rales, rhonchi  CV: RRR, S1 & S2, no murmur  GI: soft, nontender, nondistended, no guarding or rigidity, +BS in all 4 quadrants  SKIN: warm & dry without rash, no pedal edema    Discharge Disposition   Discharged to home  Condition at discharge: Stable    Consultations This Hospital Stay   SURGERY GENERAL IP CONSULT  NUTRITION SERVICES ADULT IP CONSULT    Time Spent on this Encounter   I, Derick Jansen, personally saw the patient today and spent less than or equal to 30 minutes discharging this patient.    Discharge Orders      GASTROENTEROLOGY ADULT REF PROCEDURE ONLY      Reason for your hospital stay    Colitis vs sigmoid diverticulitis with associated small bowel obstruction requiring inpatient hospital stay     Follow-up and recommended labs and tests     Follow-up with Dr. Arevalo next week as scheduled.   Schedule a colonoscopy in 6-8 weeks as well. Referral has been placed, Dr. Arevalo's office can help you coordinate this.     Activity    Slowly resume your regular activity as tolerated. No heavy lifting or strenuous physical activity for 1 week.     Diet    Follow this diet upon discharge: Low fiber diet. Please see handout for details.     Discharge Medications   Current Discharge Medication List      START taking these medications    Details   ciprofloxacin (CIPRO) 500 MG tablet Take 1 tablet (500 mg) by mouth 2 times daily for 7 days  Qty: 14 tablet, Refills: 0    Associated Diagnoses: Diverticulitis of sigmoid colon      metroNIDAZOLE (FLAGYL) 500 MG tablet Take 1 tablet (500 mg) by  mouth 3 times daily for 7 days  Qty: 21 tablet, Refills: 0    Associated Diagnoses: Diverticulitis of sigmoid colon      oxyCODONE-acetaminophen (PERCOCET) 5-325 MG tablet Take 1 tablet by mouth every 4 hours as needed for moderate to severe pain  Qty: 8 tablet, Refills: 0    Associated Diagnoses: Diverticulitis of sigmoid colon      senna-docusate (SENOKOT-S/PERICOLACE) 8.6-50 MG tablet Take 1-2 tablets by mouth 2 times daily as needed for constipation  Qty: 30 tablet, Refills: 1    Associated Diagnoses: Diverticulitis of sigmoid colon         CONTINUE these medications which have NOT CHANGED    Details   aspirin 81 MG EC tablet Take 81 mg by mouth daily      diphenhydrAMINE (BENADRYL) 25 MG tablet Take 25 mg by mouth every 8 hours as needed for itching or allergies      losartan-hydrochlorothiazide (HYZAAR) 100-25 MG per tablet Take 1 tablet by mouth every morning  Qty: 90 tablet, Refills: 3    Associated Diagnoses: Essential hypertension, benign         STOP taking these medications       amoxicillin-clavulanate (AUGMENTIN) 875-125 MG tablet Comments:   Reason for Stopping:             Allergies   Allergies   Allergen Reactions     Lisinopril      Cough with Lisinopril     Penicillin [Penicillins] Hives     Data   Most Recent 3 CBC's:  Recent Labs   Lab Test 12/18/18  0909 12/17/18  0910 12/16/18  1345   WBC 6.8 9.3 12.4*   HGB 11.8* 13.3 14.2   MCV 96 97 96    303 298      Most Recent 3 BMP's:  Recent Labs   Lab Test 12/18/18  0909 12/17/18  0910 12/16/18  1345    140 138   POTASSIUM 3.5 3.9 3.8   CHLORIDE 109 106 101   CO2 24 25 25   BUN 15 19 30   CR 0.90 1.06 1.63*   ANIONGAP 8 9 12   HARDY 8.0* 8.6 9.8   GLC 85 96 109*     Most Recent 2 LFT's:  Recent Labs   Lab Test 12/16/18  1345 02/03/16  0928   AST 14 17   ALT 17 23   ALKPHOS 65 73   BILITOTAL 0.8 0.5     Most Recent INR's and Anticoagulation Dosing History:  Anticoagulation Dose History     Recent Dosing and Labs Latest Ref Rng & Units  2/6/2014    INR 0.86 - 1.14 0.95        Most Recent 3 Troponin's:No lab results found.  Most Recent Cholesterol Panel:  Recent Labs   Lab Test 02/26/18  0811   CHOL 205*   *   HDL 69   TRIG 74     Most Recent 6 Bacteria Isolates From Any Culture (See EPIC Reports for Culture Details):No lab results found.  Most Recent TSH, T4 and A1c Labs:No lab results found.  Results for orders placed or performed during the hospital encounter of 12/16/18   Abd/pelvis CT no contrast - [facility] Protocol    Narrative    CT ABDOMEN AND PELVIS WITHOUT MERQPHXI98/16/2018 3:01 PM     HISTORY: Abdominal pain, diverticulitis suspected. Concern for  diverticulitis, started on Augmentin 4 days ago, vomiting and  diarrhea. Elevated creat so no contrast.    TECHNIQUE:  Axial images with reconstructions. No IV contrast.  Radiation dose for this scan was reduced using automated exposure  control, adjustment of the mA and/or kV according to patient size, or  iterative reconstruction technique.    COMPARISON:  2-3-16    FINDINGS:  Colonic diverticulosis. There appears to be sigmoid and  rectosigmoid wall thickening. The length of involvement is more  suggestive of colitis than diverticulitis. There are dilated small  bowel loops and decompressed distal small bowel loops, consistent with  obstruction. Unremarkable appendix. Minimal free fluid in the lower  pelvis.      Impression    IMPRESSION:  1. Small bowel obstruction.  2. Suspected sigmoid colitis.    MICKY MARTIN MD

## 2018-12-19 NOTE — PLAN OF CARE
[id number]. Pt A/O#4, VSS on RA, independent, denies pain/N/V, tolerated Cl liq for supper, will advance to Full for breakfast, abd distended/soft, BS present/active, sm soft BM tonight, passing gas, continues on Cipro/Flagyl, Surgery following, will monitor.

## 2018-12-19 NOTE — PROGRESS NOTES
Discharge    Patient discharged to home via wheelchair with Brother in law  Care plan note:Pt denies any discomfort, has had a couple loose stools so far today. Tolerating low fiber diet. VSS. Plans to discharge to home today.        Listed belongings gathered and returned to patient. Yes  Care Plan and Patient education resolved: Yes  Prescriptions if needed, hard copies sent with patient  YES  Home and hospital acquired medications returned to patient: NA  Medication Bin checked and emptied on discharge Yes  Follow up appointment made for patient: Yes

## 2018-12-19 NOTE — DISCHARGE INSTRUCTIONS
Eating a Low-fiber Diet  What is fiber?  Fiber is the part of food that the body cannot digest. It helps form stools (bowel movements).   If you eat less fiber, you may:    Reduce belly pain, diarrhea (loose, watery stools) and other digestive problems    Have fewer and smaller stools    Decrease inflammation (pain, redness and swelling) in the GI (gastro-intestinal) tract    Promote healing in the GI tract.  For a list of foods allowed in a low-fiber diet, see the back of this page.  Why might I need a low-fiber diet?  You may need a low-fiber diet if you have:     Inflamed bowels    Crohn's disease    Diverticular disease    Ulcerative colitis    Radiation therapy to the belly area    Chemotherapy    An upcoming colonoscopy    Surgery on your intestines or in the belly area.  Sample Menu  Breakfast:   1 scrambled egg   1 slice white toast with 1 teaspoon margarine     cup Cream of Wheat with sugar     cup milk      cup pulp-free orange juice  Snack:     cup canned fruit cocktail (in juice)   6 saltine crackers  Lunch:   Tuna sandwich on white bread    1 cup cream of chicken soup     cup canned peaches (in light syrup)   1 cup lemonade  Snack:     cup cottage cheese   1 medium apple, sliced and peeled  Dinner:   3 ounces well-cooked chicken breast   1 cup white rice     cup cooked canned carrots   1 white dinner roll with 1 teaspoon margarine   1 slice josselyn food cake   1 cup herbal tea  Food group Allowed Avoid   Grains Foods that contain refined white flour   (1 gram fiber or less per serving), such as bread, pasta, muffins, cereals, crackers, etc.; white rice; Cream of Wheat; Cream of Rice Whole grains (whole wheat bread, oatmeal, barley, brown or wild rice); foods containing nuts, seeds or bran   Vegetables Canned or well-cooked vegetables; mashed potatoes; non-gas-forming vegetables; vegetables without skin, seeds or pulp; vegetable juice ( 1/2 cup per day or less) Raw vegetables; cooked greens or spinach;  "  gas-forming vegetables (broccoli, cauliflower, brussels sprouts)   Fruits Peeled fresh fruit (bananas, apples, melons, nectarines); canned fruit (in juice or light syrup); fruit juice without pulp Dried fruit; fruit with pulp (oranges, grapefruit, pineapple); unpeeled fruit; prune juice   Meats and   other proteins Tender, well-cooked or ground meats; fish; eggs; tofu; smooth nut butters (peanut, soy, almond, sunflower) Crunchy nut butters; tough meats; meats with gristle (correa, sausage); dried beans or peas (legumes)   Milk products Milk, soy milk, rice milk, almond milk, coconut milk; yogurt, soy yogurt; cottage cheese, mild cheese; ice cream, sherbet If you are lactose intolerant: avoid milk, dairy products and foods made with milk  Note: Some people become lactose intolerant after surgery. This may or may not improve over time.   Other Salad dressings; oil, butter, margarine; jelly, honey, syrup Any food containing nuts or seeds; coconut; marmalade; carbonated (\"fizzy\") drinks   For informational purposes only. Not to replace the advice of your health care provider.   Copyright   2007 [facility]. All rights reserved. [employer] [id number] - REV 09/15.       "

## 2018-12-19 NOTE — PLAN OF CARE
Pt is A+OX4, VSS on RA. Denies pain. Denies n/v. Up independently in room. IVF infusing at 125/hr. IV antibiotics continued. Surgery following. Possibly discharging today.

## 2018-12-19 NOTE — PROGRESS NOTES
"General Surgery  Admission Date: 12/16/2018  Today's Date: 12/19/2018      SUBJECTIVE  Tolerating liquids without difficulty. Feels a lot more \"movement\" in his lower abdomen. Passing gas, had a soft BM this morning and feels he needs to use the toilet again. Minimal pain, much improved. No nausea. Most recent colonoscopy in September 2016, pt states his brother recently passed away from colon cancer.        OBJECTIVE  /66 (BP Location: Left arm)   Pulse 78   Temp 98.5  F (36.9  C) (Oral)   Resp 18   Ht 1.676 m (5' 6\")   Wt 73.2 kg (161 lb 6.4 oz)   SpO2 94%   BMI 26.05 kg/m    I/O last 3 completed shifts:  In: 1330 [I.V.:1330]  Out: 1810 [Urine:1700; Stool:110]  General: awake, alert, NAD, oriented x 3  Cardiovascular: regular rate and rhythm; S1 and S2 distinct without murmur  Respiratory: lungs clear to auscultation bilaterally without wheezes, rales or rhonchi   Abdomen: soft, nondistended, nontender, + bowel sounds        ASSESSMENT/PLAN  Juancarlos Ma is a 63 year old male with colitis vs sigmoid diverticulitis and associated small bowel obstruction/ileus - resolving  - Low fiber diet. If tolerates without difficulty should be able to discharge to home today  - Continue cipro + flagyl x total of 10 days for diverticulitis. Pt aware to avoid etoh while on flagyl  - Will provide a few percocet and stool softeners at discharge to use as needed. Pt having good bowel function  - Follow-up scheduled with PCP next Friday. Coordinate colonoscopy in 6-8 weeks, referral placed          Gaby Lopez PA-C  Office: [phone]  "

## 2018-12-19 NOTE — PLAN OF CARE
Pt denies any discomfort, has had a couple loose stools so far today. Tolerating low fiber diet. VSS. Plans to discharge to home today.

## 2018-12-19 NOTE — CONSULTS
"CLINICAL NUTRITION SERVICES  -  ASSESSMENT NOTE      Malnutrition:   % Weight Loss:  > 2% in 1 week (severe malnutrition)  % Intake:  <75% for > 7 days (non-severe malnutrition)  Subcutaneous Fat Loss:  Orbital region mild depletion  Muscle Loss:  Temporal region mild depletion  Fluid Retention:  None noted    Malnutrition Diagnosis: Non-Severe malnutrition  In Context of:  Acute illness or injury        REASON FOR ASSESSMENT  Juancarlos Ma is a 63 year old male seen by Registered Dietitian for Admission Nutrition Risk Screen - reduced oral intake over the last 2 months and Provider Order - Nutrition Education - low fiber education per pt request      NUTRITION HISTORY  - Information obtained from pt   -Pt follows a regular diet, with no known food allergies  -Pt consumes meals twice daily, typically skips breakfast, consumes lunch and then evening meal around 9pm.  -Pt states that he does not like a lot of fresh fruits of vegetables. Pt does consume some frozen meals and consumes canned products. Pt reports reading food labels    -Pt reports no decrease in appetite or PO intake PTA      CURRENT NUTRITION ORDERS  Diet Order:     Low Fiber, previously NPO vs clear liquid     Current Intake/Tolerance:  -Pt state that he has a great appetite, with no recent decrease.  -Pt ate 100% of breakfast this morning   -Pt states that he has noticed some wt, suspect that due to overall medical status. Pt states that his UBW is around 170-175lbs         PHYSICAL FINDINGS  Observed  No nutrition-related physical findings observed  Obtained from Chart/Interdisciplinary Team  Per MD note 12/19: \"colitis vs sigmoid diverticulitis and associated small bowel obstruction/ileus - resolving\"    ANTHROPOMETRICS  Height: 5' 6\"  Weight: 161 lbs 6.4 oz  Body mass index is 26.05 kg/m .  Weight Status:  Overweight BMI 25-29.9  IBW: 64.5 kg   % IBW: 113%  Weight History: wt loss of 4.1 kg (5.3%) over the last week   Wt Readings from Last 10 " Encounters:   12/17/18 73.2 kg (161 lb 6.4 oz)   12/11/18 77.3 kg (170 lb 8 oz)   02/26/18 78.5 kg (173 lb)   02/24/17 78.7 kg (173 lb 8 oz)   06/09/16 80.7 kg (178 lb)   02/23/16 77.4 kg (170 lb 11.2 oz)   02/03/16 77.1 kg (170 lb)   11/11/15 78.7 kg (173 lb 9.6 oz)   02/23/15 80.3 kg (177 lb)   02/22/14 69.9 kg (154 lb 1.6 oz)         LABS  Labs reviewed    MEDICATIONS  Medications reviewed      ASSESSED NUTRITION NEEDS PER APPROVED PRACTICE GUIDELINES:    Dosing Weight 73.2 kg (actual wt)  Estimated Energy Needs: 7994-4140 kcals (25-30 Kcal/Kg)  Justification: maintenance  Estimated Protein Needs: 73-88 grams protein (1-1.2 g pro/Kg)  Justification: preservation of lean body mass  Estimated Fluid Needs: 1830-220 mL (1 mL/Kcal)  Justification: maintenance    MALNUTRITION:  % Weight Loss:  > 2% in 1 week (severe malnutrition)  % Intake:  <75% for > 7 days (non-severe malnutrition)  Subcutaneous Fat Loss:  Orbital region mild depletion  Muscle Loss:  Temporal region mild depletion  Fluid Retention:  None noted    Malnutrition Diagnosis: Non-Severe malnutrition  In Context of:  Acute illness or injury    NUTRITION DIAGNOSIS:  Unintended weight loss related to altered GI as evidenced by recent advancement of diet and wt loss of 4.1 kg (5.3%) over the last week       NUTRITION INTERVENTIONS  Recommendations / Nutrition Prescription  Diet per MD      Implementation  Nutrition Education (Content):   A)  Provided handout: Fiber content of foods    B)  Discussed: low fiber diet, how long to follow a low fiber diet, importance of fiber, fiber content of foods, consuming three meals daily and how to read a food label       *  Nutrition Education (Application):   A)  Discussed current eating habits and recommended alternative food choices      *  Anticipate good compliance      *  Diet Education - refer to Education Flowsheet    Goals:  -Patient will verbalize understanding of diet by listing food that are high in fiber   -Pt  to consume at least 75% of meals TID      MONITORING AND EVALUATION:  Progress towards goals will be monitored and evaluated per protocol and Practice Guidelines      Seema Thomason RD, LD

## 2018-12-20 ENCOUNTER — TELEPHONE (OUTPATIENT)
Dept: INTERNAL MEDICINE | Facility: CLINIC | Age: 63
End: 2018-12-20

## 2018-12-20 NOTE — TELEPHONE ENCOUNTER
"ED/Discharge Protocol    \"Hi, my name is Deborah Otero, a registered nurse, and I am calling on behalf of Dr. mchugh's office at [place].  I am calling to follow up and see how things are going for you after your recent visit.\"    \"I see that you were in the (ER/UC/IP) on 12-.    How are you doing now that you are home?\" good     Is patient experiencing symptoms that may require a hospital visit?  no    Discharge Instructions    \"Let's review your discharge instructions.  What is/are the follow-up recommendations?  Pt. Response: follow up with PCP     \"Were you instructed to make a follow-up appointment?\"  Pt. Response: Yes.  Has appointment been made?   Yes      \"When you see the provider, I would recommend that you bring your discharge instructions with you.    Medications    \"How many new medications are you on since your hospitalization/ED visit?\"    0-1  \"How many of your current medicines changed (dose, timing, name, etc.) while you were in the hospital/ED visit?\"   0-1  \"Do you have questions about your medications?\"   No  \"Were you newly diagnosed with heart failure, COPD, diabetes or did you have a heart attack?\"   No  For patients on insulin: \"Did you start on insulin in the hospital or did you have your insulin dose changed?\"   No    Medication reconciliation completed? Yes    Was MTM referral placed (*Make sure to put transitions as reason for referral)?   No    Call Summary    \"Do you have any questions or concerns about your condition or care plan at the moment?\"    No  Triage nurse advice given: asked if we would be doing labs again- pt will come fasting and is not diabetic    Patient was in ER once in the past year (assess appropriateness of ER visits.)      \"If you have questions or things don't continue to improve, we encourage you contact us through the main clinic number,  [phone].  Even if the clinic is not open, triage nurses are available 24/7 to help you.     We would " "like you to know that our clinic has extended hours (provide information).  We also have urgent care (provide details on closest location and hours/contact info)\"      \"Thank you for your time and take care!\"      "

## 2018-12-28 ENCOUNTER — OFFICE VISIT (OUTPATIENT)
Dept: INTERNAL MEDICINE | Facility: CLINIC | Age: 63
End: 2018-12-28
Payer: COMMERCIAL

## 2018-12-28 VITALS
BODY MASS INDEX: 26.47 KG/M2 | TEMPERATURE: 98.2 F | SYSTOLIC BLOOD PRESSURE: 102 MMHG | DIASTOLIC BLOOD PRESSURE: 70 MMHG | WEIGHT: 164 LBS | OXYGEN SATURATION: 95 % | HEART RATE: 66 BPM

## 2018-12-28 DIAGNOSIS — Z12.5 SCREENING FOR PROSTATE CANCER: ICD-10-CM

## 2018-12-28 DIAGNOSIS — I10 ESSENTIAL HYPERTENSION, BENIGN: ICD-10-CM

## 2018-12-28 DIAGNOSIS — K57.20 DIVERTICULITIS OF LARGE INTESTINE WITH PERFORATION, UNSPECIFIED BLEEDING STATUS: Primary | ICD-10-CM

## 2018-12-28 DIAGNOSIS — K56.609 INTESTINAL OBSTRUCTION, UNSPECIFIED CAUSE, UNSPECIFIED WHETHER PARTIAL OR COMPLETE (H): ICD-10-CM

## 2018-12-28 LAB
ERYTHROCYTE [DISTWIDTH] IN BLOOD BY AUTOMATED COUNT: 12.4 % (ref 10–15)
HCT VFR BLD AUTO: 43.9 % (ref 40–53)
HGB BLD-MCNC: 14.3 G/DL (ref 13.3–17.7)
MCH RBC QN AUTO: 32.1 PG (ref 26.5–33)
MCHC RBC AUTO-ENTMCNC: 32.6 G/DL (ref 31.5–36.5)
MCV RBC AUTO: 98 FL (ref 78–100)
PLATELET # BLD AUTO: 398 10E9/L (ref 150–450)
PSA SERPL-ACNC: 0.92 UG/L (ref 0–4)
RBC # BLD AUTO: 4.46 10E12/L (ref 4.4–5.9)
WBC # BLD AUTO: 6.1 10E9/L (ref 4–11)

## 2018-12-28 PROCEDURE — 85027 COMPLETE CBC AUTOMATED: CPT | Performed by: INTERNAL MEDICINE

## 2018-12-28 PROCEDURE — 99495 TRANSJ CARE MGMT MOD F2F 14D: CPT | Performed by: INTERNAL MEDICINE

## 2018-12-28 PROCEDURE — 36415 COLL VENOUS BLD VENIPUNCTURE: CPT | Performed by: INTERNAL MEDICINE

## 2018-12-28 PROCEDURE — G0103 PSA SCREENING: HCPCS | Performed by: INTERNAL MEDICINE

## 2018-12-28 RX ORDER — LOSARTAN POTASSIUM AND HYDROCHLOROTHIAZIDE 25; 100 MG/1; MG/1
1 TABLET ORAL EVERY MORNING
Qty: 90 TABLET | Refills: 3 | Status: SHIPPED | OUTPATIENT
Start: 2018-12-28 | End: 2019-02-12

## 2018-12-28 NOTE — PROGRESS NOTES
SUBJECTIVE:   Juancarlos Ma is a 64 year old male who presents to clinic today for the following health issues:              Hospital Follow-up Visit:    Hospital/Nursing Home/IP Rehab Facility: [facility]  Date of Admission: 12/16/18  Date of Discharge: 12/19/18  Reason(s) for Admission:     Discharge Summary  Hospitalist     Date of Admission:  12/16/2018  Date of Discharge:  12/19/2018  Discharging Provider: Derick Jansen PA-C        Discharge Diagnoses        Small bowel obstruction (H)  MAG (acute kidney injury) (H)  Diverticulitis of sigmoid colon               Problems taking medications regularly:  None       Medication changes since discharge: None       Problems adhering to non-medication therapy:  None    Summary of hospitalization:  [facility] discharge summary reviewed  Diagnostic Tests/Treatments reviewed.  Follow up needed: none  Other Healthcare Providers Involved in Patient s Care:         None  Update since discharge: improved.     Post Discharge Medication Reconciliation: discharge medications reconciled, continue medications without change.  Plan of care communicated with patient     Coding guidelines for this visit:  Type of Medical   Decision Making Face-to-Face Visit       within 7 Days of discharge Face-to-Face Visit        within 14 days of discharge   Moderate Complexity [id number]   High Complexity [id number]     Since home from hospital, they report that they are feeling better.   Energy level and stamina are slowly improving.    Appetite and intake are improving.   No fevers, no chills  No shortness of breath.   No abdominal pain.   Still maintaining low resiude diet without troubles.   They have not returned to prior routine and activities.        2.    Blood presure remains well controlled at home  Readings outside clinic are within normal limits.  Reviewed last 6 BP readings in chart:  BP Readings from Last 6 Encounters:   12/28/18 102/70   12/19/18  102/61   12/11/18 122/72   02/26/18 116/74   02/24/17 118/72   06/09/16 140/76     He has not experienced any significant side effects from medicaiotns for hypertension.    NO active cardiac complaints or symptoms with exercise.           Problem list and histories reviewed & adjusted, as indicated.  Additional history: as documented        Reviewed and updated as needed this visit by clinical staff  Tobacco  Allergies  Meds  Problems  Med Hx  Surg Hx  Fam Hx       Reviewed and updated as needed this visit by Provider  Tobacco  Allergies  Problems  Med Hx  Surg Hx  Fam Hx           Past Medical History:  ---------------------------  Past Medical History:   Diagnosis Date     Diverticulosis of colon 10/2/12    incidental sigmoid diverticulosis seen on routine colonoscopy, no h/o diverticulitis     Essential hypertension, benign      Fracture of fifth metacarpal bone of left hand 11/07/2017    intra-articular left 5th metacarpal base fracture with minimally dispalced left radial styloid fracture; no operative management     Prostate cancer (H) 2013     Serrated adenoma of colon 10/2/12    2 sessile serrated adenomatous polyps removed at colonoscopy; 1 tubular adenoma       Past Surgical History:  ---------------------------  Past Surgical History:   Procedure Laterality Date     DAVINCI PROSTATECTOMY  6/10/2013    Procedure: DAVINCI PROSTATECTOMY;  ROBOTIC ASSIST PROSTATECTOMY;  Surgeon: Damon Jarrell MD;  Location:  OR      TOOTH EXTRACTION W/[facility]  1974    4 wisdom teeth removed without complication       Current Medications:  ---------------------------  Current Outpatient Medications   Medication Sig Dispense Refill     aspirin 81 MG EC tablet Take 81 mg by mouth daily       diphenhydrAMINE (BENADRYL) 25 MG tablet Take 25 mg by mouth every 8 hours as needed for itching or allergies       losartan-hydrochlorothiazide (HYZAAR) 100-25 MG tablet Take 1 tablet by mouth every morning 90 tablet  3     senna-docusate (SENOKOT-S/PERICOLACE) 8.6-50 MG tablet Take 1-2 tablets by mouth 2 times daily as needed for constipation 30 tablet 1       Allergies:  -------------  Allergies   Allergen Reactions     Lisinopril      Cough with Lisinopril     Penicillin [Penicillins] Hives       Social History:  -------------------  Social History     Socioeconomic History     Marital status: Single     Spouse name: Not on file     Number of children: Not on file     Years of education: Not on file     Highest education level: Not on file   Social Needs     Financial resource strain: Not on file     Food insecurity - worry: Not on file     Food insecurity - inability: Not on file     Transportation needs - medical: Not on file     Transportation needs - non-medical: Not on file   Occupational History     Employer: [employer]   Tobacco Use     Smoking status: Former Smoker     Packs/day: 0.50     Years: 40.00     Pack years: 20.00     Types: Cigarettes     Last attempt to quit: 2014     Years since quittin.1     Smokeless tobacco: Never Used     Tobacco comment: quit    Substance and Sexual Activity     Alcohol use: Yes     Alcohol/week: 10.0 oz     Types: 20 Cans of beer per week     Comment: daily     Drug use: No     Sexual activity: No   Other Topics Concern     Parent/sibling w/ CABG, MI or angioplasty before 65F 55M? Not Asked   Social History Narrative     Not on file       Family Medical History:  ------------------------------  Family History   Problem Relation Age of Onset     Hypertension Father          of cancer      Diabetes Brother         type II DM     Cancer Father         father  lymphoma at age 68     Respiratory Mother          at age 72 from silicosis, COPD, smoking     Family History Negative Sister      Family History Negative Brother          ROS:  REVIEW OF SYSTEMS: (since hospital d/c)    RESP: negative for cough, dyspnea, wheezing,  hemoptysis  CV: negative for chest pain, palpitations, PND, DENTON, orthopnea; reports no changes in their ability to perform physical activity (from cardiovascular standpoint)  GI: negative for dysphagia, N/V, pain, melena, diarrhea and constipation  NEURO: negative for numbness/tingling, paralysis, incoordination, or focal weakness     OBJECTIVE:                                                    /70   Pulse 66   Temp 98.2  F (36.8  C) (Oral)   Wt 74.4 kg (164 lb)   SpO2 95%   BMI 26.47 kg/m       GENERAL alert and no distress  EYES:  Normal sclera,conjunctiva, EOMI  HENT: oral and posterior pharynx without lesions or erythema, facies symmetric  NECK: Neck supple. No LAD, without thyroidmegaly or JVD., Carotids without bruits.  RESP: Clear to ausculation bilaterally without wheezes or crackles. Normal BS in all fields.  CV: RRR normal S1S2 without murmurs, rubs or gallops. PMI normal  LYMPH: no cervical lymph adenopathy appreciated  MS: extremities- no gross deformities of the visible extremities noted, no edema  PSYCH: Alert and oriented times 3; speech- coherent  SKIN:  No obvious significant skin lesions on visible portions of face  ABD:  Soft, nontedner, normal che;l sounds         ASSESSMENT/PLAN:                                                      (K57.20) Diverticulitis of large intestine with perforation, unspecified bleeding status  (primary encounter diagnosis)  Comment: recurrent episodes.   See color rectal surgery to see if he would be candidate for elective partial colectomy of affected segmetns.  Plan: COLORECTAL SURGERY REFERRAL, CBC with platelets    *  Continue lower residue (low fiber diet) for the next couple of weeks to allow less irritation in the inflamed part of the colon.     *  After a couple weeks, increase the fiber, add fiber supplement, tablets, powder, etc to help give nice formed stools.     *  See [facility] to evaluate the recurrent episodes of  diverticulitis (serious cases in 2014, 2016, 2018).  You may be considered for removal of the damged portion of the colon.     *  Continue all medications at the same doses.  Contact your usual pharmacy if you need refills.     *  I would strongly recommend that you get the annual influenza vaccine to prevent getting Influenza.  Past performance is not indicative of future results.  Not having had influenza before is NOT adequate protection against getting it in the future.       *  Return to see me in approximately 1 year, sooner if needed.  Please get labs done in the [facility] lab (or any other [facility] lab) 1-2 days before this appointment.  If you get the labs done at another [facility] location, contact them directly to make the lab appointment.  Call [phone] to schedule [facility] appointments.         (I10) Essential hypertension, benign  Comment: This condition is currently controlled on the current medical regimen.  Continue current therapy.   Discussed current hypertension treatment guidelines, including indications for treatment and treatment options.  Discussed the importance for aggressive management of HTN to prevent vascular complications later.  Recommended lower fat, lower carbohydrate, and lower sodium (<2000 mg)diet.  Discussed required intervals for follow up on HTN, lab studies.  Recommened pt. follow their blood pressures outside the clinic to ensure that BPs are remaining within guidelines, and to contact me if the readings are not within guidelines on a regular basis so we can adjust treatment as needed.   Plan: losartan-hydrochlorothiazide (HYZAAR) 100-25 MG        tablet, CBC with platelets            (K56.609) Intestinal obstruction, unspecified cause, unspecified whether partial or complete (H)  Comment:   Plan: CBC with platelets            (Z12.5) Screening for prostate cancer  Comment:   Plan: PSA, screen              See Patient Instructions    ENID AVILA M.D.,  MD  [facility]    (Chart documentation may have been completed, in part, with [employer] voice-recognition software. Even though reviewed, some grammatical, spelling, and word errors may remain.)

## 2018-12-28 NOTE — PATIENT INSTRUCTIONS
"*  Continue lower residue (low fiber diet) for the next couple of weeks to allow less irritation in the inflamed part of the colon.     *  After a couple weeks, increase the fiber, add fiber supplement, tablets, powder, etc to help give nice formed stools.     *  See [facility] clinic to evaluate the recurrent episodes of diverticulitis (serious cases in 2014, 2016, 2018).  You may be considered for removal of the damged portion of the colon.     *  Continue all medications at the same doses.  Contact your usual pharmacy if you need refills.     *  I would strongly recommend that you get the annual influenza vaccine to prevent getting Influenza.  Past performance is not indicative of future results.  Not having had influenza before is NOT adequate protection against getting it in the future.       *  Return to see me in approximately 1 year, sooner if needed.  Please get labs done in the [facility] lab (or any other [facility] lab) 1-2 days before this appointment.  If you get the labs done at another [facility] location, contact them directly to make the lab appointment.  Call [phone] to schedule [facility] appointments.       SHINGLES VACCINE:     I would recommend that you consider getting a \"shingles vaccine\".  The shingles vaccine does not stop you from getting shingles, but it decreases the intensity of the event, the duration of the event, and decreases the painful nerve condition that results     There are two options available:     --Shingrix (available starting early 2018), 2 shots, 2-6 months apart  **recommended**   OR   --Zostavax, one shot    --Based on the available data, the Shingrix vaccine has superior benefit and should be considered even if you have had the Zostavax vaccine before.      --Contact your insurance provider and ask them if either shingles vaccine is covered and is so, how much it will cost you.  Usually this will be cheaper to get in a pharmacy given by the " "pharmacist.    --Regardless of the coverage, I would recommend that you consider the vaccine since shingles is very painful, just ask anyone who has ever had it.                   5 GOALS TO PREVENT VASCULAR DISEASE:     1.  Aggressive blood pressure control, under 130/80 ideally.  Using medications if needed.    Your blood pressure is under good control    BP Readings from Last 4 Encounters:   12/28/18 102/70   12/19/18 102/61   12/11/18 122/72   02/26/18 116/74       2.  Aggressive LDL cholesterol (\"bad cholesterol\") lowering as indicated.    Your goal is an LDL under 130 for sure, preferably under 100.  (If you have diabetes or previous vascular disease, the the LDL goals would be under 100 for sure, preferably under 70.)    New guidelines identify four high-risk groups who could benefit from statins:   *people with pre-existing heart disease, such as those who have had a heart attack;   *people ages 40 to 75 who have diabetes of any type  *patients ages 40 to 75 with at least a 7.5% risk of developing cardiovascular disease over the next decade, according to a formula described in the guidelines  *patients with the sort of super-high cholesterol that sometimes runs in families, as evidenced by an LDL of 190 milligrams per deciliter or higher    Your cholesterol levels are well controlled.    Recent Labs   Lab Test 02/26/18  0811 02/24/17  0919 02/23/15  0831 01/22/13  1035   CHOL 205* 192 184 208*   HDL 69 63 66 78   * 111* 105 116   TRIG 74 92 65 70   CHOLHDLRATIO  --   --  2.8 2.7       3.  Aggressive diabetic prevention, screening and/or management.      You do not have diabetes as of the most recent blood tests.     4.  No smoking    5.  Daily aspirin: Have a discussion about the relative benefits and risks of taking daily low dose aspirin (81 mg) tablet once per day over the age of 50, unless there is a specific reason that you cannot take aspirin (such as side effect, allergy, or you are on another " "\"blood thinner\").        --Based on your current cardiac risk factors, you should take Aspirin 81 mg once per day, unless you have any reasons (side effects, intolerance, etc.) that you cannot take aspirin.             "

## 2019-02-12 ENCOUNTER — TELEPHONE (OUTPATIENT)
Dept: INTERNAL MEDICINE | Facility: CLINIC | Age: 64
End: 2019-02-12

## 2019-02-12 DIAGNOSIS — I10 ESSENTIAL HYPERTENSION, BENIGN: Primary | ICD-10-CM

## 2019-02-12 RX ORDER — HYDROCHLOROTHIAZIDE 25 MG/1
25 TABLET ORAL EVERY MORNING
Qty: 90 TABLET | Refills: 3 | Status: SHIPPED | OUTPATIENT
Start: 2019-02-12 | End: 2019-12-31

## 2019-02-12 RX ORDER — LOSARTAN POTASSIUM 100 MG/1
100 TABLET ORAL EVERY MORNING
Qty: 90 TABLET | Refills: 3 | Status: SHIPPED | OUTPATIENT
Start: 2019-02-12 | End: 2019-12-31

## 2019-02-12 NOTE — TELEPHONE ENCOUNTER
losartan-hydrochlorothiazide (HYZAAR) 100-25 MG tablet  Pt called and his pharmacy told him there was a maria g'l backorder of this med. They suggested he get scripts for the meds separately. Please call him at [phone], ok to lv detailed message  [facility] in [place] [facility]  Ph. [phone]

## 2019-02-12 NOTE — TELEPHONE ENCOUNTER
Patient would need new prescriptions.  Routed to PCP to advise.  Medications loaded, please approve if okay.

## 2019-05-13 ENCOUNTER — TRANSFERRED RECORDS (OUTPATIENT)
Dept: HEALTH INFORMATION MANAGEMENT | Facility: CLINIC | Age: 64
End: 2019-05-13

## 2019-06-15 ENCOUNTER — HOSPITAL ENCOUNTER (INPATIENT)
Facility: CLINIC | Age: 64
LOS: 3 days | Discharge: HOME | DRG: 392 | End: 2019-06-18
Attending: EMERGENCY MEDICINE | Admitting: INTERNAL MEDICINE
Payer: COMMERCIAL

## 2019-06-15 ENCOUNTER — APPOINTMENT (OUTPATIENT)
Dept: CT IMAGING | Facility: CLINIC | Age: 64
DRG: 392 | End: 2019-06-15
Attending: EMERGENCY MEDICINE
Payer: COMMERCIAL

## 2019-06-15 DIAGNOSIS — K57.32 DIVERTICULITIS OF COLON: ICD-10-CM

## 2019-06-15 DIAGNOSIS — K57.20 COLONIC DIVERTICULAR ABSCESS: ICD-10-CM

## 2019-06-15 DIAGNOSIS — K57.32 DIVERTICULITIS OF SIGMOID COLON: ICD-10-CM

## 2019-06-15 DIAGNOSIS — K57.92 DIVERTICULITIS: Primary | ICD-10-CM

## 2019-06-15 DIAGNOSIS — K56.609 SBO (SMALL BOWEL OBSTRUCTION) (H): ICD-10-CM

## 2019-06-15 LAB
ALBUMIN SERPL-MCNC: 3.7 G/DL (ref 3.4–5)
ALBUMIN UR-MCNC: NEGATIVE MG/DL
ALP SERPL-CCNC: 68 U/L (ref 40–150)
ALT SERPL W P-5'-P-CCNC: 17 U/L (ref 0–70)
ANION GAP SERPL CALCULATED.3IONS-SCNC: 8 MMOL/L (ref 3–14)
APPEARANCE UR: CLEAR
AST SERPL W P-5'-P-CCNC: 8 U/L (ref 0–45)
BASOPHILS # BLD AUTO: 0 10E9/L (ref 0–0.2)
BASOPHILS NFR BLD AUTO: 0.2 %
BILIRUB SERPL-MCNC: 1.7 MG/DL (ref 0.2–1.3)
BILIRUB UR QL STRIP: NEGATIVE
BUN SERPL-MCNC: 24 MG/DL (ref 7–30)
CALCIUM SERPL-MCNC: 9.4 MG/DL (ref 8.5–10.1)
CHLORIDE SERPL-SCNC: 102 MMOL/L (ref 94–109)
CO2 SERPL-SCNC: 27 MMOL/L (ref 20–32)
COLOR UR AUTO: YELLOW
CREAT SERPL-MCNC: 1.29 MG/DL (ref 0.66–1.25)
DIFFERENTIAL METHOD BLD: ABNORMAL
EOSINOPHIL # BLD AUTO: 0.1 10E9/L (ref 0–0.7)
EOSINOPHIL NFR BLD AUTO: 0.7 %
ERYTHROCYTE [DISTWIDTH] IN BLOOD BY AUTOMATED COUNT: 12.9 % (ref 10–15)
GFR SERPL CREATININE-BSD FRML MDRD: 58 ML/MIN/{1.73_M2}
GLUCOSE SERPL-MCNC: 129 MG/DL (ref 70–99)
GLUCOSE UR STRIP-MCNC: NEGATIVE MG/DL
HCT VFR BLD AUTO: 41 % (ref 40–53)
HGB BLD-MCNC: 13.9 G/DL (ref 13.3–17.7)
HGB UR QL STRIP: NEGATIVE
IMM GRANULOCYTES # BLD: 0 10E9/L (ref 0–0.4)
IMM GRANULOCYTES NFR BLD: 0.1 %
KETONES UR STRIP-MCNC: NEGATIVE MG/DL
LEUKOCYTE ESTERASE UR QL STRIP: NEGATIVE
LYMPHOCYTES # BLD AUTO: 1.1 10E9/L (ref 0.8–5.3)
LYMPHOCYTES NFR BLD AUTO: 9.2 %
MCH RBC QN AUTO: 33 PG (ref 26.5–33)
MCHC RBC AUTO-ENTMCNC: 33.9 G/DL (ref 31.5–36.5)
MCV RBC AUTO: 97 FL (ref 78–100)
MONOCYTES # BLD AUTO: 0.7 10E9/L (ref 0–1.3)
MONOCYTES NFR BLD AUTO: 5.8 %
NEUTROPHILS # BLD AUTO: 9.6 10E9/L (ref 1.6–8.3)
NEUTROPHILS NFR BLD AUTO: 84 %
NITRATE UR QL: NEGATIVE
NRBC # BLD AUTO: 0 10*3/UL
NRBC BLD AUTO-RTO: 0 /100
PH UR STRIP: 6 PH (ref 5–7)
PLATELET # BLD AUTO: 261 10E9/L (ref 150–450)
POTASSIUM SERPL-SCNC: 3.7 MMOL/L (ref 3.4–5.3)
PROT SERPL-MCNC: 8.2 G/DL (ref 6.8–8.8)
RBC # BLD AUTO: 4.21 10E12/L (ref 4.4–5.9)
SODIUM SERPL-SCNC: 137 MMOL/L (ref 133–144)
SOURCE: NORMAL
SP GR UR STRIP: 1.03 (ref 1–1.03)
UROBILINOGEN UR STRIP-MCNC: NORMAL MG/DL (ref 0–2)
WBC # BLD AUTO: 11.4 10E9/L (ref 4–11)

## 2019-06-15 PROCEDURE — 99285 EMERGENCY DEPT VISIT HI MDM: CPT | Mod: 25

## 2019-06-15 PROCEDURE — 96375 TX/PRO/DX INJ NEW DRUG ADDON: CPT

## 2019-06-15 PROCEDURE — 25000128 H RX IP 250 OP 636: Performed by: INTERNAL MEDICINE

## 2019-06-15 PROCEDURE — 12000000 ZZH R&B MED SURG/OB

## 2019-06-15 PROCEDURE — 96376 TX/PRO/DX INJ SAME DRUG ADON: CPT

## 2019-06-15 PROCEDURE — 74177 CT ABD & PELVIS W/CONTRAST: CPT

## 2019-06-15 PROCEDURE — 81003 URINALYSIS AUTO W/O SCOPE: CPT | Performed by: EMERGENCY MEDICINE

## 2019-06-15 PROCEDURE — 99222 1ST HOSP IP/OBS MODERATE 55: CPT | Mod: AI | Performed by: INTERNAL MEDICINE

## 2019-06-15 PROCEDURE — 25000125 ZZHC RX 250: Performed by: EMERGENCY MEDICINE

## 2019-06-15 PROCEDURE — 25800030 ZZH RX IP 258 OP 636: Performed by: INTERNAL MEDICINE

## 2019-06-15 PROCEDURE — 25000128 H RX IP 250 OP 636: Performed by: EMERGENCY MEDICINE

## 2019-06-15 PROCEDURE — 85025 COMPLETE CBC W/AUTO DIFF WBC: CPT | Performed by: EMERGENCY MEDICINE

## 2019-06-15 PROCEDURE — 96365 THER/PROPH/DIAG IV INF INIT: CPT

## 2019-06-15 PROCEDURE — 80053 COMPREHEN METABOLIC PANEL: CPT | Performed by: EMERGENCY MEDICINE

## 2019-06-15 RX ORDER — ONDANSETRON 2 MG/ML
4 INJECTION INTRAMUSCULAR; INTRAVENOUS EVERY 30 MIN PRN
Status: DISCONTINUED | OUTPATIENT
Start: 2019-06-15 | End: 2019-06-18 | Stop reason: HOSPADM

## 2019-06-15 RX ORDER — SODIUM CHLORIDE, SODIUM LACTATE, POTASSIUM CHLORIDE, CALCIUM CHLORIDE 600; 310; 30; 20 MG/100ML; MG/100ML; MG/100ML; MG/100ML
INJECTION, SOLUTION INTRAVENOUS CONTINUOUS
Status: DISCONTINUED | OUTPATIENT
Start: 2019-06-15 | End: 2019-06-18

## 2019-06-15 RX ORDER — DIPHENHYDRAMINE HCL 25 MG
25 CAPSULE ORAL EVERY 8 HOURS PRN
Status: DISCONTINUED | OUTPATIENT
Start: 2019-06-15 | End: 2019-06-18 | Stop reason: HOSPADM

## 2019-06-15 RX ORDER — CIPROFLOXACIN 2 MG/ML
400 INJECTION, SOLUTION INTRAVENOUS EVERY 12 HOURS
Status: DISCONTINUED | OUTPATIENT
Start: 2019-06-15 | End: 2019-06-18 | Stop reason: HOSPADM

## 2019-06-15 RX ORDER — MORPHINE SULFATE 4 MG/ML
4 INJECTION, SOLUTION INTRAMUSCULAR; INTRAVENOUS
Status: DISCONTINUED | OUTPATIENT
Start: 2019-06-15 | End: 2019-06-18 | Stop reason: HOSPADM

## 2019-06-15 RX ORDER — MORPHINE SULFATE 4 MG/ML
4 INJECTION, SOLUTION INTRAMUSCULAR; INTRAVENOUS
Status: COMPLETED | OUTPATIENT
Start: 2019-06-15 | End: 2019-06-15

## 2019-06-15 RX ORDER — ACETAMINOPHEN 325 MG/1
650 TABLET ORAL EVERY 4 HOURS PRN
Status: DISCONTINUED | OUTPATIENT
Start: 2019-06-15 | End: 2019-06-18 | Stop reason: HOSPADM

## 2019-06-15 RX ORDER — CIPROFLOXACIN 2 MG/ML
400 INJECTION, SOLUTION INTRAVENOUS ONCE
Status: DISCONTINUED | OUTPATIENT
Start: 2019-06-15 | End: 2019-06-15

## 2019-06-15 RX ORDER — BISACODYL 10 MG
10 SUPPOSITORY, RECTAL RECTAL DAILY PRN
Status: DISCONTINUED | OUTPATIENT
Start: 2019-06-15 | End: 2019-06-18 | Stop reason: HOSPADM

## 2019-06-15 RX ORDER — HYDROCHLOROTHIAZIDE 25 MG/1
25 TABLET ORAL EVERY MORNING
Status: DISCONTINUED | OUTPATIENT
Start: 2019-06-16 | End: 2019-06-15

## 2019-06-15 RX ORDER — HYDROMORPHONE HYDROCHLORIDE 1 MG/ML
0.2 INJECTION, SOLUTION INTRAMUSCULAR; INTRAVENOUS; SUBCUTANEOUS
Status: DISCONTINUED | OUTPATIENT
Start: 2019-06-15 | End: 2019-06-18 | Stop reason: HOSPADM

## 2019-06-15 RX ORDER — ASPIRIN 81 MG/1
81 TABLET ORAL DAILY
Status: DISCONTINUED | OUTPATIENT
Start: 2019-06-16 | End: 2019-06-18 | Stop reason: HOSPADM

## 2019-06-15 RX ORDER — LOSARTAN POTASSIUM 100 MG/1
100 TABLET ORAL EVERY MORNING
Status: DISCONTINUED | OUTPATIENT
Start: 2019-06-16 | End: 2019-06-18 | Stop reason: HOSPADM

## 2019-06-15 RX ORDER — CIPROFLOXACIN 2 MG/ML
400 INJECTION, SOLUTION INTRAVENOUS EVERY 12 HOURS
Status: DISCONTINUED | OUTPATIENT
Start: 2019-06-15 | End: 2019-06-15

## 2019-06-15 RX ORDER — ONDANSETRON 4 MG/1
4 TABLET, ORALLY DISINTEGRATING ORAL EVERY 6 HOURS PRN
Status: DISCONTINUED | OUTPATIENT
Start: 2019-06-15 | End: 2019-06-18 | Stop reason: HOSPADM

## 2019-06-15 RX ORDER — ONDANSETRON 2 MG/ML
4 INJECTION INTRAMUSCULAR; INTRAVENOUS EVERY 6 HOURS PRN
Status: DISCONTINUED | OUTPATIENT
Start: 2019-06-15 | End: 2019-06-18 | Stop reason: HOSPADM

## 2019-06-15 RX ORDER — IOPAMIDOL 755 MG/ML
86 INJECTION, SOLUTION INTRAVASCULAR ONCE
Status: COMPLETED | OUTPATIENT
Start: 2019-06-15 | End: 2019-06-15

## 2019-06-15 RX ORDER — AMOXICILLIN 250 MG
2 CAPSULE ORAL 2 TIMES DAILY PRN
Status: DISCONTINUED | OUTPATIENT
Start: 2019-06-15 | End: 2019-06-18 | Stop reason: HOSPADM

## 2019-06-15 RX ORDER — NALOXONE HYDROCHLORIDE 0.4 MG/ML
.1-.4 INJECTION, SOLUTION INTRAMUSCULAR; INTRAVENOUS; SUBCUTANEOUS
Status: DISCONTINUED | OUTPATIENT
Start: 2019-06-15 | End: 2019-06-18 | Stop reason: HOSPADM

## 2019-06-15 RX ORDER — POLYETHYLENE GLYCOL 3350 17 G/17G
17 POWDER, FOR SOLUTION ORAL DAILY PRN
Status: DISCONTINUED | OUTPATIENT
Start: 2019-06-15 | End: 2019-06-18 | Stop reason: HOSPADM

## 2019-06-15 RX ORDER — AMOXICILLIN 250 MG
1 CAPSULE ORAL 2 TIMES DAILY PRN
Status: DISCONTINUED | OUTPATIENT
Start: 2019-06-15 | End: 2019-06-18 | Stop reason: HOSPADM

## 2019-06-15 RX ADMIN — SODIUM CHLORIDE 66 ML: 9 INJECTION, SOLUTION INTRAVENOUS at 12:05

## 2019-06-15 RX ADMIN — CIPROFLOXACIN 400 MG: 2 INJECTION, SOLUTION INTRAVENOUS at 14:32

## 2019-06-15 RX ADMIN — METRONIDAZOLE 500 MG: 500 INJECTION, SOLUTION INTRAVENOUS at 13:00

## 2019-06-15 RX ADMIN — MORPHINE SULFATE 4 MG: 4 INJECTION INTRAVENOUS at 19:43

## 2019-06-15 RX ADMIN — IOPAMIDOL 86 ML: 755 INJECTION, SOLUTION INTRAVENOUS at 12:05

## 2019-06-15 RX ADMIN — MORPHINE SULFATE 4 MG: 4 INJECTION INTRAVENOUS at 11:19

## 2019-06-15 RX ADMIN — MORPHINE SULFATE 4 MG: 4 INJECTION INTRAVENOUS at 13:26

## 2019-06-15 RX ADMIN — ONDANSETRON 4 MG: 2 INJECTION INTRAMUSCULAR; INTRAVENOUS at 11:19

## 2019-06-15 RX ADMIN — SODIUM CHLORIDE, POTASSIUM CHLORIDE, SODIUM LACTATE AND CALCIUM CHLORIDE: 600; 310; 30; 20 INJECTION, SOLUTION INTRAVENOUS at 14:33

## 2019-06-15 RX ADMIN — METRONIDAZOLE 500 MG: 500 INJECTION, SOLUTION INTRAVENOUS at 19:43

## 2019-06-15 ASSESSMENT — ENCOUNTER SYMPTOMS
FEVER: 0
DYSURIA: 0
CONSTIPATION: 1
VOMITING: 0
ABDOMINAL PAIN: 1

## 2019-06-15 ASSESSMENT — ACTIVITIES OF DAILY LIVING (ADL)
ADLS_ACUITY_SCORE: 11
ADLS_ACUITY_SCORE: 11

## 2019-06-15 ASSESSMENT — MIFFLIN-ST. JEOR: SCORE: 1507.94

## 2019-06-15 NOTE — PHARMACY-ADMISSION MEDICATION HISTORY
Admission medication history interview status for the 6/15/2019  admission is complete. See EPIC admission navigator for prior to admission medications     Medication history source reliability:Good    Actions taken by pharmacist (provider contacted, etc):None     Additional medication history information not noted on PTA med list : patient reported only taking these 3 medications. Last doses were this morning prior to admission.     Medication reconciliation/reorder completed by provider prior to medication history? Yes    Time spent in this activity: 5    Prior to Admission medications    Medication Sig Last Dose Taking? Auth Provider   aspirin 81 MG EC tablet Take 81 mg by mouth daily 6/15/2019 at AM Yes Unknown, Entered By History   hydrochlorothiazide (HYDRODIURIL) 25 MG tablet Take 1 tablet (25 mg) by mouth every morning 6/15/2019 at AM Yes Wili Arevalo MD   losartan (COZAAR) 100 MG tablet Take 1 tablet (100 mg) by mouth every morning 6/15/2019 at AM Yes Wili Arevalo MD

## 2019-06-15 NOTE — ED PROVIDER NOTES
"  History     Chief Complaint:  Abdominal Pain    The history is provided by the patient.      Juancarlos Ma is a 64 year old male with a history of bowel obstructions and diverticulitis who presents with abdominal pain. The patient has had abdominal pain for the last 5 days. The patient states that he states it feels like when he has had in the past. He has had bowel movements over this time up until this morning and he has been passing gas. He has some nausea. The patient denies any fever, vomiting, and dysuria.     Allergies:  Lisinopril  Penicillin     Medications:    Aspirin 81 mg ec tablet  Diphenhydramine (benadryl) 25 mg tablet  Hydrochlorothiazide (hydrodiuril) 25 mg tablet  Losartan (cozaar) 100 mg tablet    Past Medical History:    Diverticulosis of colon   Essential hypertension, benign   Fracture of fifth metacarpal bone of left hand   Prostate cancer   Serrated adenoma of colon   Elevated PSA  Hyperlipidemia   Tobacco use disorder  Bowel obstruction     Past Surgical History:    Davinci prostatectomy  [place] teeth extraction    Family History:    Hypertension  Cancer  Respiratory  Diabetes    Social History:  Patient is single  Tobacco Use: Former smoker  Alcohol Use: Yes  PCP: Wili Arevalo     Review of Systems   Constitutional: Negative for fever.   Gastrointestinal: Positive for abdominal pain and constipation. Negative for vomiting.   Genitourinary: Negative for dysuria.   All other systems reviewed and are negative.    Physical Exam   First Vitals:  Patient Vitals for the past 24 hrs:   BP Temp Temp src Pulse Resp SpO2 Height Weight   06/15/19 1330 108/65 -- -- 75 -- 92 % -- --   06/15/19 1329 -- -- -- -- -- 95 % -- --   06/15/19 1300 114/71 -- -- 76 -- 94 % -- --   06/15/19 1140 -- -- -- -- -- 95 % -- --   06/15/19 1130 107/67 -- -- 81 -- (!) 89 % -- --   06/15/19 1044 108/62 97.2  F (36.2  C) Temporal 82 16 96 % 1.676 m (5' 6\") 78 kg (172 lb)     Physical Exam  General: Alert, " interactive in mild distress  Head:  Scalp is atraumatic  Eyes:  The pupils are equal, round, and reactive to light    EOM's intact    No scleral icterus  ENT:      Nose:  The external nose is normal  Ears:  External ears are normal  Mouth/Throat: The oropharynx is normal    Mucus membranes are moist       Neck:  Normal range of motion.      There is no rigidity.    Trachea is in the midline         CV:  Regular rate and rhythm    No murmur   Resp:  Breath sounds are clear bilaterally    Non-labored, no retractions or accessory muscle use      GI:  Abdomen is soft, no distension. Mild diffuse abdominal pain.    Hypoactive BS  MS:  Normal strength in all 4 extremities  Skin:  Warm and dry, No rash or lesions noted.  Neuro: Strength 5/5 x4.  Sensation intact  In all 4 extremities.        Psych:  Awake. Alert.  Normal affect.      Appropriate interactions.    Emergency Department Course     Imaging:  Radiographic findings were communicated with the patient who voiced understanding of the findings.  CT abdomen pelvis w contrast:  1. Bowel wall thickening with surrounding fat stranding in the mid  sigmoid colon is consistent with acute diverticulitis.  2. An adjacent 5 cm fluid and gas collection likely represents a  diverticular abscess.  3. This suspected diverticular abscess abuts a loop of distal small  bowel, in the right lower quadrant, and likely causes a distal small  bowel obstruction. A coloenteric fistula in this location cannot be  excluded.  4. Trace amount of free fluid in the pelvis is nonspecific but is most  likely related to diverticulitis and small bowel obstruction. Per radiology read.     Laboratory:  CBC:  WBC 11.4 high, HGB 13.9,   CMP: Glucose 129 high, Creatinine 1.29 high, GFR 58 low, Bilirubin 1.7 high, o/w WNL.   UA: WNL    Interventions:  1119: Zofran 4mg IV  1119: Morphine 4mg IV  1205: Isovue 86mL IV  1300: Flagyl 500mg IV  1326: Morphine 4mg IV  Ordered: Cipro 400mg IV    Emergency  Department Course:  10:52 AM Nursing notes and vitals reviewed.  I performed an exam of the patient as documented above.     Findings and plan explained to the patient who consents to admission.   1:10 PM I discussed the patient with Dr. Luz of the hospitalist service, who will admit the patient to a medical bed for further monitoring, evaluation, and treatment.    Impression & Plan      Medical Decision Making:  Juancarlos Ma is a 64 year old male patient that was seen and evaluated. Findings are consistent with acute diverticulitis with a small abscess. There is no signs of severe sepsis, septic shock, or more concerning illness. He does not have any perineal signs to suggest an acute surgical emergency. He is not vomiting therefore a nasogastric tube was not placed. I spoke with Dr. Luz from the hospitalist service who is in agreement for admission for further evaluation and treatment and IV antibiotic. The patient may require a colorectal consultation at some point for operative intervention.      Diagnosis:    ICD-10-CM    1. Diverticulitis of colon K57.32    2. Colonic diverticular abscess K57.20    3. SBO (small bowel obstruction) (H) K56.609        Disposition:  Admitted to the hospitalist    I, Bradley Aasen, am serving as a scribe on 6/15/2019 at 10:52 AM to personally document services performed by Irvin Jackson MD based on my observations and the provider's statements to me.        Irvin Jackson MD  06/15/19 [id number]

## 2019-06-15 NOTE — ED NOTES
"[facility]  ED Nurse Handoff Report    ED Chief complaint: Abdominal Pain      ED Diagnosis:   Final diagnoses:   Diverticulitis of colon   Colonic diverticular abscess   SBO (small bowel obstruction) (H)       Code Status: Full Code    Allergies:   Allergies   Allergen Reactions     Lisinopril      Cough with Lisinopril     Penicillin [Penicillins] Hives       Activity level - Baseline/Home:  Independent  Activity Level - Current:   Independent    Patient's Preferred language: English   Needed?: No    Isolation: No  Infection: Not Applicable  Bariatric?: No    Vital Signs:   Vitals:    06/15/19 1044 06/15/19 1130 06/15/19 1140   BP: 108/62 107/67    Pulse: 82 81    Resp: 16     Temp: 97.2  F (36.2  C)     TempSrc: Temporal     SpO2: 96% (!) 89% 95%   Weight: 78 kg (172 lb)     Height: 1.676 m (5' 6\")         Cardiac Rhythm: ,        Pain level: 0-10 Pain Scale: 6    Is this patient confused?: No   Does this patient have a guardian?  No         If yes, is there guardianship documents in the Epic \"Code/ACP\" activity?  No         Guardian Notified?  N/A  [facility] - Suicide Severity Rating Scale Completed?  No, secondary to n/a  If yes, what color did the patient score?  N/A    Patient Report: Initial Complaint: Abdominal pain, history of bowel obstruction and diverticulitis.  Focused Assessment: VSS, noted to have 9/10 cramping abdominal pain, distended abdomen. Reports passing minimal flatus and loose stool yesterday.   Tests Performed: Labs, UA, CT of abdomen  Abnormal Results:   Labs Ordered and Resulted from Time of ED Arrival Up to the Time of Departure from the ED   CBC WITH PLATELETS DIFFERENTIAL - Abnormal; Notable for the following components:       Result Value    WBC 11.4 (*)     RBC Count 4.21 (*)     Absolute Neutrophil 9.6 (*)     All other components within normal limits   COMPREHENSIVE METABOLIC PANEL - Abnormal; Notable for the following components:    Glucose 129 (*)     " Creatinine 1.29 (*)     GFR Estimate 58 (*)     Bilirubin Total 1.7 (*)     All other components within normal limits   UA MACROSCOPIC WITH REFLEX TO MICRO AND CULTURE   PERIPHERAL IV CATHETER   FREE WATER       Treatments provided: Medication, Flagyl currently infusing, did not hang Cipro yet.    Family Comments: Patient is single. Sister is main contact.     OBS brochure/video discussed/provided to patient/family: No              Name of person given brochure if not patient: n/a              Relationship to patient: n/a    ED Medications:   Medications   ondansetron (ZOFRAN) injection 4 mg (4 mg Intravenous Given 6/15/19 1119)   ciprofloxacin (CIPRO) infusion 400 mg (has no administration in time range)   metroNIDAZOLE (FLAGYL) infusion 500 mg (has no administration in time range)   morphine (PF) injection 4 mg (4 mg Intravenous Given 6/15/19 1119)   iopamidol (ISOVUE-370) solution 86 mL (86 mLs Intravenous Given 6/15/19 1205)   Saline flush (66 mLs Intravenous Given 6/15/19 1205)       Drips infusing?:  Yes    For the majority of the shift this patient was Green.   Interventions performed were n/a.    Severe Sepsis OR Septic Shock Diagnosis Present: No    To be done/followed up on inpatient unit:  Continue plan of care    ED NURSE PHONE NUMBER: [id number]

## 2019-06-15 NOTE — H&P
Admitted:     06/15/2019      PRIMARY CARE PHYSICIAN:  Wili Arevalo MD      PRIMARY COLORECTAL SURGEON:  Neel Grimes MD      CHIEF COMPLAINT:  Abdominal pain.      HISTORY OF PRESENT ILLNESS:  Mr. Juancarlos Ma is a 64-year-old gentleman with history of diverticulitis and small bowel obstruction in 12/2018.  At that time, he was seen by Colorectal Surgery and he deferred surgery.  The patient has been having abdominal pain for the last 5 days and has been treating himself by being on clear liquid diet and has been doing reasonably well until yesterday when he started eating a little bit more solid food and has had more pain and discomfort.  The patient denied any fever, vomiting or urinary complaints.  The patient came to [facility] for further assessment.      In the emergency room, the patient was seen by Dr. Irvin Jackson.  The patient had temperature of 99.3, otherwise stable vital signs.  Blood work revealed normal electrolytes, BUN of 24, creatinine 1.2 grams, calculated GFR of 58.  Total bilirubin slightly low at 1.7, but otherwise LFTs are unremarkable.  Glucose is 149.  White count 11.4, hemoglobin 13.9, platelets 261 with a left shift.  Urinalysis is unremarkable.        CT of the abdomen and pelvis revealed bowel wall thickening with surrounding fat stranding in the mid sigmoid colon consistent with acute diverticulitis.  There is an adjacent 5 cm fluid and gas collection, likely represents a diverticular abscess.  This diverticular abscess abuts a loop of distal small bowel in the right lower quadrant and likely causing distal small bowel obstruction.  A coloenteric fistula in this location cannot be excluded and trace amount of free fluid noted.  The patient is being admitted for further workup.      In the Emergency Department, due to his PENICILLIN ALLERGY, the patient received Cipro and Flagyl.  The patient also received morphine and Zofran.  The patient will be admitted as  inpatient admission.      PAST MEDICAL HISTORY:   1.  Chronic diverticulosis.   2.  Hypertension.   3.  History of fifth metacarpal bone on the left hand.   4.  Prostate cancer.   5.  Serrated adenoma of the colon.   6.  Elevated PSA   7.  Hyperlipidemia.   8.  Tobacco use disorder.   9.  History of bowel obstruction.      PAST SURGICAL HISTORY:     1.  [facility] prostatectomy.   2.  [facility] tooth extraction.      FAMILY HISTORY:  Hypertension, cancer and diabetes.      SOCIAL HISTORY:  He is single and lives with his mother.  Former smoker, occasional alcohol.        CODE STATUS:  Full code.      ALLERGIES:  LISINOPRIL AND PENICILLIN.      CURRENT MEDICATIONS:   1.  Aspirin 81 mg once a day.   2.  Hydrochlorothiazide 25 mg daily.   3.  Cozaar 100 mg once day.   4.  Senna 1-2 tablets twice a day as needed.      REVIEW OF SYSTEMS:  Ten-point systems was reviewed and otherwise negative.      PHYSICAL EXAMINATION:   VITAL SIGNS:  Temperature 99.3, heart rate 82, respirations 16, blood pressure 107/89, sats 99% on room air.   GENERAL:  The patient is not toxic appearing, pleasant, cooperative, in no distress.   HEENT:  Pupils equal, round, react to light.  Extraocular movements intact.  Sclerae anicteric.  Oropharynx without lesions.  Mucous membranes are tacky.   NECK:  Neck veins are flat.  No JVP elevation.   PULMONARY:  Lungs are clear to auscultation.   CARDIOVASCULAR:  S1, S2, regular rate and rhythm.   ABDOMEN:  Soft with some tenderness in the left lower quadrant.  There is no guarding or rebound.  Bowel sounds are hypoactive.     MUSCULOSKELETAL:  No edema.   NEUROLOGIC:  The patient was grossly nonfocal.  Cranial nerves grossly intact.     PSYCHIATRIC:  Mood and affect stable, congruent.   SKIN:  Warm, dry, well perfused.      LABORATORY DATA:  As dictated above.        ASSESSMENT:  Juancarlos Ma is a 64 years old with history of diverticular disease including prior history of diverticulitis, now admitted with  diverticulitis as well as a diverticular abscess as well as possible distal partial small bowel obstruction.  He is being admitted for further treatment.      PLAN:   1.  Diverticulitis.  The patient has PENICILLIN ALLERGY.  The patient will be treated with ciprofloxacin and Flagyl.   2.  Diverticular abscess.  The patient has a 5 cm area of diverticular abscess.  The patient will be seen by Colorectal Surgery.  We will also obtain a consultation from Interventional Radiology for possible percutaneous drainage of this.   3.  Distal small bowel obstruction.  The patient denies any symptoms of nausea or vomiting.  The patient will be given lactated Ringer's.  We will keep the patient n.p.o. and await surgical assistance and direction in this person.   4.  Hypertension.  We will continue the patient on his Cozaar but hold his hydrochlorothiazide.   5.  Deep venous thrombosis prophylaxis:  The patient will receive compression boots.   6.  CODE STATUS:  Full.         LETICIA ARORA MD             D: 06/15/2019   T: 06/15/2019   MT: PK      Name:     LIVIA SEAY   MRN:      [id number]        Account:      [id number]   :      1955        Admitted:     06/15/2019                   Document: [id number]       cc: Wili Grimes MD

## 2019-06-15 NOTE — PROGRESS NOTES
RECEIVING UNIT ED HANDOFF REVIEW    ED Nurse Handoff Report was reviewed by: Tori Wright on Carlene 15, 2019 at 1:29 PM

## 2019-06-16 LAB
ANION GAP SERPL CALCULATED.3IONS-SCNC: 7 MMOL/L (ref 3–14)
BUN SERPL-MCNC: 24 MG/DL (ref 7–30)
CALCIUM SERPL-MCNC: 8.8 MG/DL (ref 8.5–10.1)
CHLORIDE SERPL-SCNC: 103 MMOL/L (ref 94–109)
CO2 SERPL-SCNC: 26 MMOL/L (ref 20–32)
CREAT SERPL-MCNC: 1.14 MG/DL (ref 0.66–1.25)
ERYTHROCYTE [DISTWIDTH] IN BLOOD BY AUTOMATED COUNT: 13 % (ref 10–15)
GFR SERPL CREATININE-BSD FRML MDRD: 67 ML/MIN/{1.73_M2}
GLUCOSE SERPL-MCNC: 94 MG/DL (ref 70–99)
HCT VFR BLD AUTO: 38.5 % (ref 40–53)
HGB BLD-MCNC: 13.1 G/DL (ref 13.3–17.7)
MCH RBC QN AUTO: 32.9 PG (ref 26.5–33)
MCHC RBC AUTO-ENTMCNC: 34 G/DL (ref 31.5–36.5)
MCV RBC AUTO: 97 FL (ref 78–100)
PLATELET # BLD AUTO: 242 10E9/L (ref 150–450)
POTASSIUM SERPL-SCNC: 3.6 MMOL/L (ref 3.4–5.3)
RBC # BLD AUTO: 3.98 10E12/L (ref 4.4–5.9)
SODIUM SERPL-SCNC: 136 MMOL/L (ref 133–144)
WBC # BLD AUTO: 8.7 10E9/L (ref 4–11)

## 2019-06-16 PROCEDURE — 12000000 ZZH R&B MED SURG/OB

## 2019-06-16 PROCEDURE — 40000893 ZZH STATISTIC PT IP EVAL DEFER

## 2019-06-16 PROCEDURE — 25000132 ZZH RX MED GY IP 250 OP 250 PS 637: Performed by: INTERNAL MEDICINE

## 2019-06-16 PROCEDURE — 25000128 H RX IP 250 OP 636: Performed by: INTERNAL MEDICINE

## 2019-06-16 PROCEDURE — 85027 COMPLETE CBC AUTOMATED: CPT | Performed by: INTERNAL MEDICINE

## 2019-06-16 PROCEDURE — 80048 BASIC METABOLIC PNL TOTAL CA: CPT | Performed by: INTERNAL MEDICINE

## 2019-06-16 PROCEDURE — 99232 SBSQ HOSP IP/OBS MODERATE 35: CPT | Performed by: INTERNAL MEDICINE

## 2019-06-16 PROCEDURE — 36415 COLL VENOUS BLD VENIPUNCTURE: CPT | Performed by: INTERNAL MEDICINE

## 2019-06-16 PROCEDURE — 25800030 ZZH RX IP 258 OP 636: Performed by: INTERNAL MEDICINE

## 2019-06-16 RX ADMIN — METRONIDAZOLE 500 MG: 500 INJECTION, SOLUTION INTRAVENOUS at 00:57

## 2019-06-16 RX ADMIN — CIPROFLOXACIN 400 MG: 2 INJECTION, SOLUTION INTRAVENOUS at 02:00

## 2019-06-16 RX ADMIN — SODIUM CHLORIDE, POTASSIUM CHLORIDE, SODIUM LACTATE AND CALCIUM CHLORIDE: 600; 310; 30; 20 INJECTION, SOLUTION INTRAVENOUS at 23:43

## 2019-06-16 RX ADMIN — METRONIDAZOLE 500 MG: 500 INJECTION, SOLUTION INTRAVENOUS at 21:32

## 2019-06-16 RX ADMIN — ASPIRIN 81 MG: 81 TABLET, COATED ORAL at 08:03

## 2019-06-16 RX ADMIN — SODIUM CHLORIDE, POTASSIUM CHLORIDE, SODIUM LACTATE AND CALCIUM CHLORIDE: 600; 310; 30; 20 INJECTION, SOLUTION INTRAVENOUS at 00:57

## 2019-06-16 RX ADMIN — CIPROFLOXACIN 400 MG: 2 INJECTION, SOLUTION INTRAVENOUS at 14:54

## 2019-06-16 RX ADMIN — METRONIDAZOLE 500 MG: 500 INJECTION, SOLUTION INTRAVENOUS at 13:32

## 2019-06-16 RX ADMIN — METRONIDAZOLE 500 MG: 500 INJECTION, SOLUTION INTRAVENOUS at 08:03

## 2019-06-16 RX ADMIN — Medication 1 MG: at 21:35

## 2019-06-16 RX ADMIN — SODIUM CHLORIDE, POTASSIUM CHLORIDE, SODIUM LACTATE AND CALCIUM CHLORIDE: 600; 310; 30; 20 INJECTION, SOLUTION INTRAVENOUS at 14:56

## 2019-06-16 ASSESSMENT — ACTIVITIES OF DAILY LIVING (ADL)
ADLS_ACUITY_SCORE: 11

## 2019-06-16 ASSESSMENT — MIFFLIN-ST. JEOR: SCORE: 1489.34

## 2019-06-16 NOTE — PLAN OF CARE
[id number]: A&Ox4. VSS on RA, soft BPs. 6/10 left lower abdominal pain, improved with IV morphine. BS active, not passing, no BM. Abd rounded, tender. Denies nausea. Strict NPO. LR @ 100. LS clear. Up independently. Plan for possible resection eventually.    [id number]:  Slept well overnight. BS active, passing gas, no BM. Decreased pain, no morphine given.

## 2019-06-16 NOTE — PROGRESS NOTES
[facility]    Medicine Progress Note - Hospitalist Service       Date of Admission:  6/15/2019    Assessment & Plan      64 years old with history of diverticular disease including prior history of diverticulitis, now admitted with diverticulitis as well as a diverticular abscess as well as possible distal partial small bowel obstruction.      Diverticulitis with diverticular abscess  Per patient report he has a history of recurrent diverticulitis.  This is his fourth admission  He was seen by colorectal surgery as outpatient  Surgery was recommended but he decided against that  He ended up with another episode of diverticulitis abscess  He is on IV Cipro and Flagyl.  He is improving  He was seen by colorectal surgery and their input is appreciated  Recommend nonoperative management  Agreed for IR to see if they can drain the abscess  If conservative treatment fails then they would consider surgery  I spoke to the on-call IR physician Dr. Noble  They will schedule this procedure for tomorrow  He recommended me to change the orders to use CT-guided drainage  Hold aspirin  Hold any anticoagulation for procedure tomorrow  He is on IV hydration  Continue IV antibiotic and IV hydration and n.p.o. except for sips of water and ice chips    Hypertension:  -continue the patient on his Cozaar but hold his hydrochlorothiazide.     Diet: NPO for Medical/Clinical Reasons Except for: Meds, Ice Chips    DVT Prophylaxis: Pneumatic Compression Devices  Velazquez Catheter: not present  Code Status: Full Code      Disposition Plan   Expected discharge: 2 - 3 days,  Entered: Barb Javed MD 06/16/2019, 5:11 PM       The patient's care was discussed with the Bedside Nurse and Patient.    Barb Javed MD  Hospitalist Service  [facility]    ______________________________________________________________________    Interval History   Pain is better than before  No  Nausea or vomiting  No chest pain  or any shortness of breath    Data reviewed today: I reviewed all medications, new labs and imaging results over the last 24 hours. I personally reviewed .    Physical Exam   Vital Signs: Temp: 99.1  F (37.3  C) Temp src: Oral BP: 109/63   Heart Rate: 67 Resp: 17 SpO2: 93 % O2 Device: None (Room air)    Weight: 167 lbs 14.4 oz      GENERAL APPEARANCE: healthy, alert and no distress  EYES: Eyes grossly normal to inspection, PERRL and conjunctivae and sclerae normal  NECK: no adenopathy  RESP: lungs clear to auscultation - no rales, rhonchi or wheezes  CV: regular rates and rhythm, normal S1 S2, no S3  Abdomen is soft there is no guarding or rigidity bowel sounds are positive  He has minimal in both lower quadrant          Data   Recent Labs   Lab 06/16/19  0800 06/15/19  1113   WBC 8.7 11.4*   HGB 13.1* 13.9   MCV 97 97    261    137   POTASSIUM 3.6 3.7   CHLORIDE 103 102   CO2 26 27   BUN 24 24   CR 1.14 1.29*   ANIONGAP 7 8   HARDY 8.8 9.4   GLC 94 129*   ALBUMIN  --  3.7   PROTTOTAL  --  8.2   BILITOTAL  --  1.7*   ALKPHOS  --  68   ALT  --  17   AST  --  8     Disclaimer: This note consists of symbols derived from keyboarding, dictation and/or voice recognition software. As a result, there may be errors in the script that have gone undetected. Please consider this when interpreting information found in this chart.

## 2019-06-16 NOTE — PLAN OF CARE
PT Note 6/16/2019 3:11 PM    PT eval attempted at scheduled time. Per chart review and discussion with RN pt is independent with ambulation. Pt reported ambulating in the hallway independently without RN staff's assist. Pt reported having no concerns for strength or balance at this time. Pt is admitted with diverticulitis and small bowel obstruction for which he may undergo surgery in the next few days. Pt is encouraged to ambulate in the hallway as tolerated with nursing staff supervision. Pt with no acute PT needs at this time and therefore will complete orders. Discussed with pt and RN. Pt and RN agreed to the plan.

## 2019-06-16 NOTE — PLAN OF CARE
"3p-7p shift note  Overall improving. Very minimal, mild abd discomfort - refusing offer of pain meds. Abdomen flat, soft, nontender to palpation, passing flatus.  Is on Bowel rest (NPO), IV fluids and antibiotics for treatment of current diverticulits, SBO and diverticular abscess. Dr. Luz planning on IR dept to see pt Monday to determine if a drain would need to be placed for this abscess. Writer questioned Dr. Luz if the daily aspirin needs to be held for this potential procedure and the MD said \"no, just continue the aspirin\". Pt is A&O, gets up indep in room. States is hoping that any expensive procedures can be held till July 1st because then his \"Insurance adjustment thru [employer] will have added benefits and thus better coverage and no out of pocket expenses\". Continue to monitor  "

## 2019-06-16 NOTE — CONSULTS
[facility]  Colon and Rectal Surgery Consult Note  Name: Juancarlos Ma    MRN: [id number]  YOB: 1955    Age: 64 year old  Date of admission: 6/15/2019  Primary care provider: Wili Arevalo     Requesting Physician:  Dr. Luz  Reason for consult:  diverticulitis           History of Present Illness:   Juancarlos Ma is a 64 year old male, seen at the request of Dr. Otero, presents with abdominal pain.  Has a history of recurrent uncomplicated diverticulitis.  Developed recurrent symptoms about 5 days ago.  Self treated with clear liquid diet, unfortunately this did not resolve and he presented to [facility] for evaluation. WBC noted to be mildly elevated.  CT showed a pelvic abscess with diverticulitis, Hinchey 1/2 with associated loop of small bowel stuck to abscess, possibly obstructing.      Previously had seen me in the office to discuss surgery and this was offered to him and he declined    Had nausea yesterday.  No nausea today.  No emesis.  Passing gas today.  No BM.            Colonoscopy History:  [facility] 2016.  Had diverticulosis and one small tubular adenoma.            Past Medical History:     Past Medical History:   Diagnosis Date     Diverticulosis of colon 10/2/12    incidental sigmoid diverticulosis seen on routine colonoscopy, no h/o diverticulitis     Essential hypertension, benign      Fracture of fifth metacarpal bone of left hand 11/07/2017    intra-articular left 5th metacarpal base fracture with minimally dispalced left radial styloid fracture; no operative management     Prostate cancer (H) 2013     Serrated adenoma of colon 10/2/12    2 sessile serrated adenomatous polyps removed at colonoscopy; 1 tubular adenoma             Past Surgical History:     Past Surgical History:   Procedure Laterality Date     DAVINCI PROSTATECTOMY  6/10/2013    Procedure: DAVINCI PROSTATECTOMY;  ROBOTIC ASSIST PROSTATECTOMY;  Surgeon: Damon Jarrell MD;  Location:  OR     [facility]  "EXTRACTION W/FORCEP  1974    4 wisdom teeth removed without complication               Social History:     Social History     Tobacco Use     Smoking status: Former Smoker     Packs/day: 0.50     Years: 40.00     Pack years: 20.00     Types: Cigarettes     Last attempt to quit: 2014     Years since quittin.5     Smokeless tobacco: Never Used     Tobacco comment: quit    Substance Use Topics     Alcohol use: Yes     Alcohol/week: 10.0 oz     Types: 20 Cans of beer per week     Comment: daily             Family History:     Family History   Problem Relation Age of Onset     Hypertension Father          of cancer      Cancer Father         father  lymphoma at age 68     Respiratory Mother          at age 72 from silicosis, COPD, smoking     Diabetes Brother         type II DM     Family History Negative Sister      Family History Negative Brother              Allergies:     Allergies   Allergen Reactions     Lisinopril      Cough with Lisinopril     Penicillin [Penicillins] Hives             Medications:       aspirin  81 mg Oral Daily     ciprofloxacin  400 mg Intravenous Q12H     losartan  100 mg Oral QAM     metroNIDAZOLE  500 mg Intravenous Q6H             Review of Systems:   A comprehensive greater than 10 system review of systems was carried out.  Pertinent positives and negatives are noted above.  Otherwise negative for contributory info.            Physical Exam:     Blood pressure 98/58, pulse 75, temperature 99.3  F (37.4  C), temperature source Oral, resp. rate 16, height 1.676 m (5' 6\"), weight 76.2 kg (167 lb 14.4 oz), SpO2 91 %.    Intake/Output Summary (Last 24 hours) at 2019 0920  Last data filed at 2019 0739  Gross per 24 hour   Intake 1600 ml   Output 900 ml   Net 700 ml     Exam:  General - Awake alert and oriented, appears  stated age  Head, Eyes, ENT: normocephalic, atraumatic, pupils equal, round and sclera anicteric  Pulm - Non-labored breathing " with normal respiratory effort  CVS - reg rate and rhythm, no peripheral edema  Abd - mostly soft, mildly firm in RLQ, non distended, minimally tender, Rectal exam was not performed.   Neuro - CN II-XII grossly intact  Musculoskeletal - extremities with no clubbing, cyanosis or edema; able to ambulate  Psych - responsive, alert, cooperative; oriented x3; appropriate mood and affect  External/skin - inspection reveals no rashes, lesions or ulcers, normal coloring           Data Reviewed:     Recent Results (from the past 48 hour(s))   CT Abdomen Pelvis w Contrast    Narrative    CT ABDOMEN AND PELVIS WITH CONTRAST   6/15/2019 12:10 PM     HISTORY: Abdominal pain.    TECHNIQUE: 86 mL Isovue-370 IV were administered. After contrast  administration, volumetric helical sections were acquired from the  lung bases to the ischial tuberosities. Coronal images were also  reconstructed. Radiation dose for this scan was reduced using  automated exposure control, adjustment of the mA and/or kV according  to patient size, or iterative reconstruction technique.    COMPARISON: CT of the abdomen and pelvis performed 12/16/2018.     FINDINGS: Colonic diverticulosis. Focal bowel wall thickening in the  mid sigmoid colon with mild surrounding fat stranding consistent with  acute diverticulitis. A fluid and gas collection adjacent to the  thickened sigmoid colon on the right likely represents a diverticular  abscess and measures approximately 3.7 x 3.4 x 5 cm (series 2 image  59). This diverticular abscess abuts a loop of distal small bowel and  appears to cause moderate dilatation of several loops of small bowel  (series 2 image 54). There is a small amount of nonspecific free fluid  in the pelvis.  The uterus is not seen and is likely surgically  absent. Moderate atherosclerotic aortoiliac calcification. The liver,  gallbladder, spleen, adrenal glands, pancreas, and kidneys are  unremarkable. No hydronephrosis. No free intraperitoneal  air is  identified. The visualized lung bases are clear.  Degenerative changes  are noted in the visualized thoracolumbar spine.      Impression    IMPRESSION:   1. Bowel wall thickening with surrounding fat stranding in the mid  sigmoid colon is consistent with acute diverticulitis.  2. An adjacent 5 cm fluid and gas collection likely represents a  diverticular abscess.  3. This suspected diverticular abscess abuts a loop of distal small  bowel, in the right lower quadrant, and likely causes a distal small  bowel obstruction. A coloenteric fistula in this location cannot be  excluded.  4. Trace amount of free fluid in the pelvis is nonspecific but is most  likely related to diverticulitis and small bowel obstruction.      MERLYN WINSLOW MD       Recent Labs   Lab 06/16/19  0800 06/15/19  1113   WBC 8.7 11.4*   HGB 13.1* 13.9   HCT 38.5* 41.0   MCV 97 97    261     Recent Labs   Lab 06/16/19  0800 06/15/19  1113    137   POTASSIUM 3.6 3.7   CHLORIDE 103 102   CO2 26 27   ANIONGAP 7 8   GLC 94 129*   BUN 24 24   CR 1.14 1.29*   GFRESTIMATED 67 58*   GFRESTBLACK 78 67   HARDY 8.8 9.4   PROTTOTAL  --  8.2   ALBUMIN  --  3.7   BILITOTAL  --  1.7*   ALKPHOS  --  68   AST  --  8   ALT  --  17         Assessment and Plan:   65 yo M with recurrent diverticulitis now with complicated diverticulitis with abscess and evidence of possible partial small bowel obstruction. Clinically not obstructed.  I reviewed physical exam findings and CT findings with him.  Had previously reviewed risks of surgery with him in the office and again briefly reviewed these today.    Advised him that given size of abscess, treatment with antibiotics alone is unlikely to be sufficient.  IR has been consulted for possible percutaneous drainage.  Unclear if there is an adequate window for drainage, but if there is a window for drainage, perc drain would be preferable to operative drainage.     No plans for urgent surgery.  Await to see if  IR can drain and to see how he responds to antibiotics.  If these fail, then would need surgery this admission.  Otherwise, if able to drain via IR, would plan for surgery on more elective basis.  Will follow.       Plan:  1. Admit to hospitalists  2. Surgery: non operative management  3. Diet: NPO, ok for meds and ice chips  4. IV Fluids: per hospitalists  5. Antibiotics:  per hospitalists  6. Medications:  OK for home meds   7. I&O s:  strict I&O s  8. Labs:   - Reviewed: cbc, bmp  - Ordered: none   9. Imaging:   - I have reviewed the CT' of abdomen and pelvis  - Ordered: none  10. Activity:  up ad cesar  11. DVT prophylaxis: SCD s,     Patient specific identified risk factors considered as part of today s evaluation include: prior abdominal surgery.     Additional history obtained from outpatient chart.    Total time spent on consultation: 25    Time spent on counseling and coordinating care activities: 20

## 2019-06-16 NOTE — PLAN OF CARE
Pt Ao4.  Independently walked throughout unit and room. Voids per urinal.  Denies pain.  NPO strict with exception of ice chips and oral meds.

## 2019-06-17 ENCOUNTER — APPOINTMENT (OUTPATIENT)
Dept: CT IMAGING | Facility: CLINIC | Age: 64
DRG: 392 | End: 2019-06-17
Attending: INTERNAL MEDICINE
Payer: COMMERCIAL

## 2019-06-17 LAB
ANION GAP SERPL CALCULATED.3IONS-SCNC: 6 MMOL/L (ref 3–14)
APTT PPP: 37 SEC (ref 22–37)
BUN SERPL-MCNC: 17 MG/DL (ref 7–30)
CALCIUM SERPL-MCNC: 9.3 MG/DL (ref 8.5–10.1)
CHLORIDE SERPL-SCNC: 103 MMOL/L (ref 94–109)
CO2 SERPL-SCNC: 25 MMOL/L (ref 20–32)
CREAT SERPL-MCNC: 0.92 MG/DL (ref 0.66–1.25)
ERYTHROCYTE [DISTWIDTH] IN BLOOD BY AUTOMATED COUNT: 12.6 % (ref 10–15)
GFR SERPL CREATININE-BSD FRML MDRD: 86 ML/MIN/{1.73_M2}
GLUCOSE SERPL-MCNC: 89 MG/DL (ref 70–99)
HCT VFR BLD AUTO: 40.1 % (ref 40–53)
HGB BLD-MCNC: 13.6 G/DL (ref 13.3–17.7)
INR PPP: 1.2 (ref 0.86–1.14)
MCH RBC QN AUTO: 32.8 PG (ref 26.5–33)
MCHC RBC AUTO-ENTMCNC: 33.9 G/DL (ref 31.5–36.5)
MCV RBC AUTO: 97 FL (ref 78–100)
PLATELET # BLD AUTO: 234 10E9/L (ref 150–450)
POTASSIUM SERPL-SCNC: 4.3 MMOL/L (ref 3.4–5.3)
RBC # BLD AUTO: 4.15 10E12/L (ref 4.4–5.9)
SODIUM SERPL-SCNC: 134 MMOL/L (ref 133–144)
WBC # BLD AUTO: 6.6 10E9/L (ref 4–11)

## 2019-06-17 PROCEDURE — 25000132 ZZH RX MED GY IP 250 OP 250 PS 637: Performed by: INTERNAL MEDICINE

## 2019-06-17 PROCEDURE — 25800030 ZZH RX IP 258 OP 636: Performed by: INTERNAL MEDICINE

## 2019-06-17 PROCEDURE — 36415 COLL VENOUS BLD VENIPUNCTURE: CPT | Performed by: INTERNAL MEDICINE

## 2019-06-17 PROCEDURE — 25000128 H RX IP 250 OP 636: Performed by: INTERNAL MEDICINE

## 2019-06-17 PROCEDURE — 12000000 ZZH R&B MED SURG/OB

## 2019-06-17 PROCEDURE — 85610 PROTHROMBIN TIME: CPT | Performed by: INTERNAL MEDICINE

## 2019-06-17 PROCEDURE — 40000863 ZZH STATISTIC RADIOLOGY XRAY, US, CT, MAR, NM

## 2019-06-17 PROCEDURE — 0WJG3ZZ INSPECTION OF PERITONEAL CAVITY, PERCUTANEOUS APPROACH: ICD-10-PCS | Performed by: RADIOLOGY

## 2019-06-17 PROCEDURE — 27210258 CT RETROPERITONEAL ABSCESS ASPIRATION

## 2019-06-17 PROCEDURE — 85027 COMPLETE CBC AUTOMATED: CPT | Performed by: INTERNAL MEDICINE

## 2019-06-17 PROCEDURE — 85730 THROMBOPLASTIN TIME PARTIAL: CPT | Performed by: INTERNAL MEDICINE

## 2019-06-17 PROCEDURE — 99233 SBSQ HOSP IP/OBS HIGH 50: CPT | Performed by: INTERNAL MEDICINE

## 2019-06-17 PROCEDURE — 80048 BASIC METABOLIC PNL TOTAL CA: CPT | Performed by: INTERNAL MEDICINE

## 2019-06-17 RX ORDER — NICOTINE POLACRILEX 4 MG
15-30 LOZENGE BUCCAL
Status: DISCONTINUED | OUTPATIENT
Start: 2019-06-17 | End: 2019-06-18 | Stop reason: HOSPADM

## 2019-06-17 RX ORDER — LIDOCAINE 40 MG/G
CREAM TOPICAL
Status: DISCONTINUED | OUTPATIENT
Start: 2019-06-17 | End: 2019-06-18 | Stop reason: HOSPADM

## 2019-06-17 RX ORDER — LIDOCAINE HYDROCHLORIDE 10 MG/ML
30 INJECTION, SOLUTION EPIDURAL; INFILTRATION; INTRACAUDAL; PERINEURAL ONCE
Status: DISCONTINUED | OUTPATIENT
Start: 2019-06-17 | End: 2019-06-18 | Stop reason: HOSPADM

## 2019-06-17 RX ORDER — DEXTROSE MONOHYDRATE 25 G/50ML
25-50 INJECTION, SOLUTION INTRAVENOUS
Status: DISCONTINUED | OUTPATIENT
Start: 2019-06-17 | End: 2019-06-18 | Stop reason: HOSPADM

## 2019-06-17 RX ADMIN — METRONIDAZOLE 500 MG: 500 INJECTION, SOLUTION INTRAVENOUS at 21:54

## 2019-06-17 RX ADMIN — ASPIRIN 81 MG: 81 TABLET, COATED ORAL at 08:35

## 2019-06-17 RX ADMIN — SODIUM CHLORIDE, POTASSIUM CHLORIDE, SODIUM LACTATE AND CALCIUM CHLORIDE: 600; 310; 30; 20 INJECTION, SOLUTION INTRAVENOUS at 13:09

## 2019-06-17 RX ADMIN — CIPROFLOXACIN 400 MG: 2 INJECTION, SOLUTION INTRAVENOUS at 03:13

## 2019-06-17 RX ADMIN — HYDROMORPHONE HYDROCHLORIDE 0.2 MG: 1 INJECTION, SOLUTION INTRAMUSCULAR; INTRAVENOUS; SUBCUTANEOUS at 10:16

## 2019-06-17 RX ADMIN — Medication 1 MG: at 22:02

## 2019-06-17 RX ADMIN — ACETAMINOPHEN 650 MG: 325 TABLET, FILM COATED ORAL at 04:40

## 2019-06-17 RX ADMIN — METRONIDAZOLE 500 MG: 500 INJECTION, SOLUTION INTRAVENOUS at 04:38

## 2019-06-17 RX ADMIN — CIPROFLOXACIN 400 MG: 2 INJECTION, SOLUTION INTRAVENOUS at 15:26

## 2019-06-17 RX ADMIN — LOSARTAN POTASSIUM 100 MG: 100 TABLET, FILM COATED ORAL at 08:35

## 2019-06-17 RX ADMIN — METRONIDAZOLE 500 MG: 500 INJECTION, SOLUTION INTRAVENOUS at 10:16

## 2019-06-17 RX ADMIN — METRONIDAZOLE 500 MG: 500 INJECTION, SOLUTION INTRAVENOUS at 16:49

## 2019-06-17 ASSESSMENT — ACTIVITIES OF DAILY LIVING (ADL)
ADLS_ACUITY_SCORE: 11

## 2019-06-17 ASSESSMENT — MIFFLIN-ST. JEOR: SCORE: 1492.51

## 2019-06-17 NOTE — PROCEDURES
RADIOLOGY POST PROCEDURE NOTE    Patient name: Juancarlos Ma  MRN: [id number]  : 1955    Pre-procedure diagnosis: pelvic abscess  Post-procedure diagnosis: Same    Procedure Date/Time: 2019  11:26 AM  Procedure: ct guided abscess aspiration.   Estimated blood loss: None  Specimen(s) collected with description: none    The patient tolerated the procedure well with no immediate complications.  Significant findings: Multiple attempts were made to aspirate the abscess, however each aspiration only resulted in a syringe full of air.  The procedure was terminated.     See imaging dictation for procedural details.    Provider name: Lauryn De Luna  Assistant(s):None

## 2019-06-17 NOTE — PROGRESS NOTES
COLON & RECTAL SURGERY  PROGRESS NOTE    June 17, 2019  Hospital day #2, diverticulitis with abscess    SUBJECTIVE:  Pt up in chair upon arrival. Had a BM this morning, loose and non-bloody. +flatus. Denies pain or nausea. Plan for IR drainage today.     OBJECTIVE:  Temp:  [96.8  F (36  C)-99.1  F (37.3  C)] 97.2  F (36.2  C)  Pulse:  [72] 72  Heart Rate:  [67-77] 77  Resp:  [16-17] 16  BP: (109-138)/(63-65) 114/65  SpO2:  [93 %-94 %] 93 %    Intake/Output Summary (Last 24 hours) at 6/17/2019 0937  Last data filed at 6/17/2019 0700  Gross per 24 hour   Intake --   Output 1300 ml   Net -1300 ml       GENERAL:  Awake, alert, no acute distress, up in chair  HEAD: Normocephalic atraumatic  SCLERA: Anicteric  EXTREMITIES: Warm and well perfused  ABDOMEN:  Soft, appropriately tender, non-distended. No guarding, rigidity, or peritoneal signs.     LABS:  Lab Results   Component Value Date    WBC 6.6 06/17/2019     Lab Results   Component Value Date    HGB 13.6 06/17/2019     Lab Results   Component Value Date    HCT 40.1 06/17/2019     Lab Results   Component Value Date     06/17/2019     Last Basic Metabolic Panel:  Lab Results   Component Value Date     06/17/2019      Lab Results   Component Value Date    POTASSIUM 4.3 06/17/2019     Lab Results   Component Value Date    CHLORIDE 103 06/17/2019     Lab Results   Component Value Date    HARDY 9.3 06/17/2019     Lab Results   Component Value Date    CO2 25 06/17/2019     Lab Results   Component Value Date    BUN 17 06/17/2019     Lab Results   Component Value Date    CR 0.92 06/17/2019     Lab Results   Component Value Date    GLC 89 06/17/2019       ASSESSMENT/PLAN: Hospital day #2 diverticulitis with abscess. AVSS, denies pain or nausea. +bm and flatus. IV Cipro/Flagyl. Plan for IR drainage today.     1. NPO diet   2. PRN pain medication  3. IVF per primary, IV Cipro/flagyl   4. OOB, ambulate as able  5. Await IR drainage, will reassess after procedure    This  plan has been discussed with Dr Grimes.       For questions/paging, please contact the CRS office at [phone].    Richelle Cruz PA-C  [facility]  Phone: [phone]    CRS Staff.  Seen and examined independently.  Reviewed IR study.  Overall doing well.  Agree with above.    Clear liquid diet.  If doing OK, can discharge tomorrow on oral antibiotics and plan for surgery as outpatient.    I performed a history and physical examination of the patient and discussed their management with the physician assistant. I reviewed the physician assistants note and agree with the documented findings and plan of care.     Neel Grimes MD  [facility]  [phone] (office)  [phone] (pager)  [email or web address]

## 2019-06-17 NOTE — PROGRESS NOTES
[facility]    Hospitalist Progress Note    Assessment & Plan   64 years old with history of diverticular disease including prior history of diverticulitis, now admitted with diverticulitis as well as a diverticular abscess as well as possible distal partial small bowel obstruction.       Diverticulitis with diverticular abscess  Per patient report he has a history of recurrent diverticulitis.  This is his fourth admission  -per pt nl colonoscopy 3 years ago  He was seen by colorectal surgery as outpatient  Surgery was recommended but he decided against it at that time but now agrees to having colon surgery  He ended up with another episode of diverticulitis abscess  He is on IV Cipro and Flagyl.  He is improving. + flattus, stool 6/17.    He had resolution of pain on day of admission  He was seen by colorectal surgery and their input is appreciated  No pain on abd exam, nl vitals. Afebrile.     Plan:   -Iv Cipro and Flagy.    Recommend nonoperative management. Discuss with surgery if future colon resection recommended. Pt open to this now once he changes his insurance  Agreed for IR to see if they can drain the abscess today  If conservative treatment fails then they would consider surgery   my partners spoke to the on-call IR physician Dr. Noble  They will schedule this procedure for today  He recommended me to change the orders to use CT-guided drainage  Hold aspirin  Hold any anticoagulation for procedure tomorrow  He is on IV hydration  Continue IV antibiotic and IV hydration and n.p.o. except for sips of water and ice chips  -probably able to start diet today. Await input from surgery     Addendum; IR unable to drain abscess. Discussed with surgery, start clears and cont iv abx for now. May need repeat CT abd in 1-2 weeks to f/u abscess. Surgery determining appropriate plan    Hypertension:  -good control, nl  bmp  -continue the patient on his Cozaar but hold his hydrochlorothiazide.       Diet: NPO for Medical/Clinical Reasons Except for: Meds, Ice Chips  likely start diet today  DVT Prophylaxis: Pneumatic Compression Devices  Velazquez Catheter: not present  Code Status: Full Code          Disposition Plan: possibly tomorrow if doing well. Await surgery in put      Mario Meadows MD  Text Page  (7am to 6pm)  Interval History   Had nl stool today  Passing gas since yesterday  No abd pain since day of admission  No n/v/d,cp,sob  Wants to ultimately have colon surgery    -Data reviewed today: I reviewed all new labs and imaging results over the last 24 hours. I personally reviewed imaging and labs since admission    Physical Exam   Temp: 97.2  F (36.2  C) Temp src: Oral BP: 114/65 Pulse: 72 Heart Rate: 77 Resp: 16 SpO2: 93 % O2 Device: None (Room air)    Vitals:    06/15/19 1044 06/16/19 0500 06/17/19 0500   Weight: 78 kg (172 lb) 76.2 kg (167 lb 14.4 oz) 76.5 kg (168 lb 9.6 oz)     Vital Signs with Ranges  Temp:  [96.8  F (36  C)-99.1  F (37.3  C)] 97.2  F (36.2  C)  Pulse:  [72] 72  Heart Rate:  [67-77] 77  Resp:  [16-17] 16  BP: (109-138)/(63-65) 114/65  SpO2:  [93 %-94 %] 93 %  I/O last 3 completed shifts:  In: -   Out: 1300 [Urine:1300]    Constitutional: Sitting up in chair, nad, nontoxic  Respiratory: cTAB  Cardiovascular: RRR no r/g/m  GI: nl BS, soft, NT, ND  Skin/Integumen: no rash or edema  Psych:nl affect  Neuro: alert, conversant, nl speech and mentation      Medications     lactated ringers 100 mL/hr at 06/16/19 2343       aspirin  81 mg Oral Daily     ciprofloxacin  400 mg Intravenous Q12H     losartan  100 mg Oral QAM     metroNIDAZOLE  500 mg Intravenous Q6H     sodium chloride (PF)  3 mL Intracatheter Q8H       Data   Recent Labs   Lab 06/17/19  0823 06/16/19  0800 06/15/19  1113   WBC 6.6 8.7 11.4*   HGB 13.6 13.1* 13.9   MCV 97 97 97    242 261   NA  --  136 137   POTASSIUM  --  3.6 3.7   CHLORIDE  --  103 102   CO2  --  26 27   BUN  --  24 24   CR  --  1.14 1.29*    ANIONGAP  --  7 8   HARDY  --  8.8 9.4   GLC  --  94 129*   ALBUMIN  --   --  3.7   PROTTOTAL  --   --  8.2   BILITOTAL  --   --  1.7*   ALKPHOS  --   --  68   ALT  --   --  17   AST  --   --  8       Imaging:   No results found for this or any previous visit (from the past 24 hour(s)).

## 2019-06-17 NOTE — PLAN OF CARE
Oriented x4, pleasant.  IV fluids infusing.  IV dilaudid given prior to procedure.  Went to IR for abscess drainage but procedure was unsuccessful.  Slight discomfort after procedure but refused meds.  Up independently within room, and walking in halls.  Large, brown BM in AM.  Passing flatus.  New IV started.  Advanced to clear liquid diet, tolerating.  Awaiting colorectal recommendation tomorrow to determine plan.  Nursing will continue to monitor.

## 2019-06-17 NOTE — PLAN OF CARE
Primary Diagnosis: Diverticulitis with abscess and SBO.  Orientation: A&Ox4, very pleasant  Mobility:Indep  Pain Management: IV morphine  Diet: Strict NPO  Bowel/Bladder: Urinal at bedside or walks to bathroom, continent.   Abnormal Lab/Assessments: Cr. 1.29  Drain/Device/Wound: L PIV LR @ 100  Consults: Colorectal surgery (possible resection, pt would like to wait until new insurance starts after July 1st)  D/C Day/Goals/Place: pending improvement     Shift Note:   [id number]  A&Ox4. VSS on RA, cms intact denies  pain left lower abdominal pain, gave tylenol for headache effective active,BS + passing, no BM. Abd rounded, Denies nausea. Strict NPO. LR @ 100. LS clear. Up independently. Plan for possible IR drain in the morning continue to monitor.

## 2019-06-18 VITALS
DIASTOLIC BLOOD PRESSURE: 64 MMHG | BODY MASS INDEX: 26.68 KG/M2 | HEART RATE: 61 BPM | RESPIRATION RATE: 16 BRPM | TEMPERATURE: 97.1 F | HEIGHT: 66 IN | SYSTOLIC BLOOD PRESSURE: 131 MMHG | OXYGEN SATURATION: 96 % | WEIGHT: 166 LBS

## 2019-06-18 LAB
ANION GAP SERPL CALCULATED.3IONS-SCNC: 8 MMOL/L (ref 3–14)
BUN SERPL-MCNC: 9 MG/DL (ref 7–30)
CALCIUM SERPL-MCNC: 8.6 MG/DL (ref 8.5–10.1)
CHLORIDE SERPL-SCNC: 105 MMOL/L (ref 94–109)
CO2 SERPL-SCNC: 25 MMOL/L (ref 20–32)
CREAT SERPL-MCNC: 0.83 MG/DL (ref 0.66–1.25)
GFR SERPL CREATININE-BSD FRML MDRD: >90 ML/MIN/{1.73_M2}
GLUCOSE SERPL-MCNC: 106 MG/DL (ref 70–99)
POTASSIUM SERPL-SCNC: 3.4 MMOL/L (ref 3.4–5.3)
SODIUM SERPL-SCNC: 138 MMOL/L (ref 133–144)

## 2019-06-18 PROCEDURE — 25800030 ZZH RX IP 258 OP 636: Performed by: INTERNAL MEDICINE

## 2019-06-18 PROCEDURE — 99239 HOSP IP/OBS DSCHRG MGMT >30: CPT | Performed by: INTERNAL MEDICINE

## 2019-06-18 PROCEDURE — 25000128 H RX IP 250 OP 636: Performed by: INTERNAL MEDICINE

## 2019-06-18 PROCEDURE — 25000132 ZZH RX MED GY IP 250 OP 250 PS 637: Performed by: INTERNAL MEDICINE

## 2019-06-18 PROCEDURE — 36415 COLL VENOUS BLD VENIPUNCTURE: CPT | Performed by: INTERNAL MEDICINE

## 2019-06-18 PROCEDURE — 80048 BASIC METABOLIC PNL TOTAL CA: CPT | Performed by: INTERNAL MEDICINE

## 2019-06-18 RX ORDER — METRONIDAZOLE 500 MG/1
500 TABLET ORAL 3 TIMES DAILY
Qty: 21 TABLET | Refills: 0 | Status: SHIPPED | OUTPATIENT
Start: 2019-06-18 | End: 2019-07-17

## 2019-06-18 RX ORDER — AMOXICILLIN 250 MG
1-2 CAPSULE ORAL 2 TIMES DAILY PRN
Qty: 30 TABLET | Refills: 1 | Status: ON HOLD | OUTPATIENT
Start: 2019-06-18 | End: 2019-07-24

## 2019-06-18 RX ORDER — OXYCODONE AND ACETAMINOPHEN 5; 325 MG/1; MG/1
1 TABLET ORAL EVERY 6 HOURS PRN
Qty: 5 TABLET | Refills: 0 | Status: SHIPPED | OUTPATIENT
Start: 2019-06-18 | End: 2019-07-17

## 2019-06-18 RX ORDER — CIPROFLOXACIN 500 MG/1
500 TABLET, FILM COATED ORAL 2 TIMES DAILY
Qty: 14 TABLET | Refills: 0 | Status: ON HOLD | OUTPATIENT
Start: 2019-06-18 | End: 2019-07-24

## 2019-06-18 RX ADMIN — SODIUM CHLORIDE, POTASSIUM CHLORIDE, SODIUM LACTATE AND CALCIUM CHLORIDE: 600; 310; 30; 20 INJECTION, SOLUTION INTRAVENOUS at 09:00

## 2019-06-18 RX ADMIN — CIPROFLOXACIN 400 MG: 2 INJECTION, SOLUTION INTRAVENOUS at 02:31

## 2019-06-18 RX ADMIN — METRONIDAZOLE 500 MG: 500 INJECTION, SOLUTION INTRAVENOUS at 03:51

## 2019-06-18 RX ADMIN — ASPIRIN 81 MG: 81 TABLET, COATED ORAL at 09:01

## 2019-06-18 RX ADMIN — LOSARTAN POTASSIUM 100 MG: 100 TABLET, FILM COATED ORAL at 09:01

## 2019-06-18 RX ADMIN — METRONIDAZOLE 500 MG: 500 INJECTION, SOLUTION INTRAVENOUS at 10:16

## 2019-06-18 ASSESSMENT — ACTIVITIES OF DAILY LIVING (ADL)
ADLS_ACUITY_SCORE: 11

## 2019-06-18 ASSESSMENT — MIFFLIN-ST. JEOR: SCORE: 1480.72

## 2019-06-18 NOTE — PROGRESS NOTES
COLON & RECTAL SURGERY  PROGRESS NOTE    June 18, 2019  Hospital day #3, diverticulitis with abscess    SUBJECTIVE:  Pt ambulating in go upon arrival. He denies any nausea, vomiting, or pain. +flatus and BM this morning. Hoping to have diet advanced and discharge home today.     OBJECTIVE:  Temp:  [97  F (36.1  C)-98.2  F (36.8  C)] 97  F (36.1  C)  Heart Rate:  [60-63] 62  Resp:  [16-18] 18  BP: (116-121)/(52-69) 116/52  SpO2:  [92 %-96 %] 92 %    Intake/Output Summary (Last 24 hours) at 6/18/2019 0736  Last data filed at 6/18/2019 0600  Gross per 24 hour   Intake 1523 ml   Output 1650 ml   Net -127 ml       GENERAL:  Awake, alert, no acute distress, ambulating in go   HEAD: Normocephalic atraumatic  SCLERA: Anicteric  EXTREMITIES: Warm and well perfused    LABS:  Lab Results   Component Value Date    WBC 6.6 06/17/2019     Lab Results   Component Value Date    HGB 13.6 06/17/2019     Lab Results   Component Value Date    HCT 40.1 06/17/2019     Lab Results   Component Value Date     06/17/2019     Last Basic Metabolic Panel:  Lab Results   Component Value Date     06/18/2019      Lab Results   Component Value Date    POTASSIUM 3.4 06/18/2019     Lab Results   Component Value Date    CHLORIDE 105 06/18/2019     Lab Results   Component Value Date    HARDY 8.6 06/18/2019     Lab Results   Component Value Date    CO2 25 06/18/2019     Lab Results   Component Value Date    BUN 9 06/18/2019     Lab Results   Component Value Date    CR 0.83 06/18/2019     Lab Results   Component Value Date     06/18/2019       ASSESSMENT/PLAN: Hospital day #3 diverticulitis with abscess. Failed IR drainage yesterday. Tolerating clear liquids, no nausea or fevers. Bowels functioning. Plan for discharge on full liquid diet and oral abx. Follow-up as outpatient for elective surgery with Dr Grimes.     1. Advance to full liquid diet  2. PRN pain medication  3. IVF per primary  4. OOB, ambulate QID  5. Can start oral  abx, discharge on these today. Follow-up with Dr Grimes for surgery discussion in next 1-2 weeks. Our office will call patient.     This plan has been discussed with Dr Grimes.      For questions/paging, please contact the CRS office at [phone].    Richelle Cruz PA-C  [facility]  Phone: [phone]

## 2019-06-18 NOTE — PLAN OF CARE
Pt tolerating clears. No c/o nausea or abd pain. Up ind. On IV Cipro and Flagyl. Possible discharge today.

## 2019-06-18 NOTE — PLAN OF CARE
Discharged at aprox 1245 via w/c to home with family transport. AVS copy given to/reviewed with pt. Including all discharge meds. Pt expressed understanding of info received and had no rurther questions. All belongings sent home with pt

## 2019-06-18 NOTE — PLAN OF CARE
A&O x4, VSS on RA, denies pain. Up independently in room and go. PIV infusing LR w/ intermittent abx. Clear liquid diet. C/o not sleeping well, prn melatonin given at bedtime. Awaiting colorectal recommendation tomorrow for plan. Continue to monitor.

## 2019-06-18 NOTE — PLAN OF CARE
"7a-3p shift report  Pt feeling well and requesting to be discharged early this am.   Yesterday's IR dept's attempts to aspirate from \"abscess\" was unsuccessful.  IR\"s note mentioned: Multiple attempts were made to aspirate the abscess, however only resulted in syringe full of air\"  Tolerating CL diet. Passing flatus, abdomen soft, denies pain, is up indep in room and go with steady gait  Remains on IV Cipro and IV Flagyl with Iv fluids of LR at 75/hr  Disposition: Anticipate discharge home today if OK and seen by Colorectal  "

## 2019-06-18 NOTE — DISCHARGE SUMMARY
[facility]    Discharge Summary  Hospitalist    Date of Admission:  6/15/2019  Date of Discharge:  6/18/2019  Discharging Provider: Mario Meadows MD    Discharge Diagnoses   Acute sigmoid diverticulitis with abscess. Failed attempt at IR guided drainage. F/u with CRS to discuss surgery    History of Present Illness     Mr. Juancarlos Ma is a 64-year-old gentleman with history of diverticulitis and small bowel obstruction in 12/2018.  At that time, he was seen by Colorectal Surgery and he deferred surgery.  The patient has been having abdominal pain for the last 5 days and has been treating himself by being on clear liquid diet and has been doing reasonably well until yesterday when he started eating a little bit more solid food and has had more pain and discomfort.  The patient denied any fever, vomiting or urinary complaints.  The patient came to [facility] for further assessment.      In the emergency room, the patient was seen by Dr. Irvin Jackson.  The patient had temperature of 99.3, otherwise stable vital signs.  Blood work revealed normal electrolytes, BUN of 24, creatinine 1.2 grams, calculated GFR of 58.  Total bilirubin slightly low at 1.7, but otherwise LFTs are unremarkable.  Glucose is 149.  White count 11.4, hemoglobin 13.9, platelets 261 with a left shift.  Urinalysis is unremarkable.        CT of the abdomen and pelvis revealed bowel wall thickening with surrounding fat stranding in the mid sigmoid colon consistent with acute diverticulitis.  There is an adjacent 5 cm fluid and gas collection, likely represents a diverticular abscess.  This diverticular abscess abuts a loop of distal small bowel in the right lower quadrant and likely causing distal small bowel obstruction.  A coloenteric fistula in this location cannot be excluded and trace amount of free fluid noted.  The patient is being admitted for further workup.      In the Emergency Department, due to his  PENICILLIN ALLERGY, the patient received Cipro and Flagyl.  The patient also received morphine and Zofran.  The patient will be admitted as inpatient admission.       Hospital Course   Juancarlos Ma was admitted on 6/15/2019.  The following problems were addressed during his hospitalization:    Active Problems:    Diverticulitis  64 years old with history of diverticular disease including prior history of diverticulitis, now admitted with diverticulitis as well as a diverticular abscess as well as possible distal partial small bowel obstruction.       Diverticulitis with diverticular abscess  Per patient report he has a history of recurrent diverticulitis.  This is his fourth admission  -per pt nl colonoscopy 3 years ago  He was seen by colorectal surgery as outpatient  Surgery was recommended but he decided against it at that time but now agrees to having colon surgery  He ended up with another episode of diverticulitis abscess  -Patient treated with IV antibiotics and IV fluids.  He was followed daily by colorectal surgery.  No pain on abd exam, nl vitals. Afebrile following admission to the hospital.   -During his stay CRS Recommend nonoperative management this hospital stay.   -IR unable to drain abscess on multiple attempts  -Patient was treated with IV ciprofloxacin and IV Flagyl.  - hospital course one of quick improvement. Had resolution of abd pain by day 2. Pt afebrile during hospital stay. Tolerated advancing diet to full liquid diet. His WBC normalized to 6. Vitals nl.     Plan at discharge:  -Hold aspirin until after upcoming surgery  -Discharge on Ciprofloxacin 500mg bid and Flagyl 500mg TID for 12 days  -Follow up outpatient with CRS in 1- 2 weeks to discuss surgery given this is his 4-5th bout of diverticulitis.  He may not require colorectal surgery clinic visit they may just schedule his surgery.  They will call patient  -Full liquid diet for 7 days then progressed to low fiber diet until  surgery  -Preoperative visit with PCP prior to surgery      Hypertension:  -good control, nl  bmp  -continue the patient on his Cozaar but hold his hydrochlorothiazide.      Diet: Patient initially n.p.o. then progressed to clear liquids and then full liquid diet at time of discharge.  DVT Prophylaxis: Pneumatic Compression Devices  Velazquez Catheter: not present  Code Status: Full Code          Disposition: discharge home.     Mario Meadows MD    Significant Results and Procedures   See hospital course    Pending Results   none  Unresulted Labs Ordered in the Past 30 Days of this Admission     No orders found from 4/16/2019 to 6/16/2019.          Code Status   Full Code       Primary Care Physician   Wili Arevalo    Physical Exam   Temp: (P) 97.1  F (36.2  C) Temp src: (P) Oral BP: (P) 131/64 Pulse: (P) 61 Heart Rate: 62 Resp: (P) 16 SpO2: (P) 96 % O2 Device: (P) None (Room air)    Vitals:    06/16/19 0500 06/17/19 0500 06/18/19 0625   Weight: 76.2 kg (167 lb 14.4 oz) 76.5 kg (168 lb 9.6 oz) 75.3 kg (166 lb)     Vital Signs with Ranges  Temp:  [97  F (36.1  C)-98.2  F (36.8  C)] 97.1  F (36.2  C)  Pulse:  [61] 61  Heart Rate:  [60-63] 62  Resp:  [16-18] 16  BP: (116-131)/(52-69) 131/64  SpO2:  [92 %-96 %] 96 %  I/O last 3 completed shifts:  In: 1523 [P.O.:825; I.V.:698]  Out: 2250 [Urine:2250]  Constitutional: Sitting up in chair, nad, nontoxic  Respiratory:     cTAB  Cardiovascular: RRR no r/g/m  GI: nl BS, soft, NT, ND  Skin/Integumen: no rash or edema  Psych: Normal affect  Neuro: alert, conversant, nl speech and mentation        Discharge Disposition   Discharged to home  Condition at discharge: Good    Consultations This Hospital Stay   PHYSICAL THERAPY ADULT IP CONSULT  COLORECTAL SURGERY IP CONSULT    Time Spent on this Encounter   IMario, personally saw the patient today and spent greater than 30 minutes discharging this patient.    Discharge Orders      Reason for your hospital  stay    Acute sigmoid diverticulitis     Activity    Your activity upon discharge: activity as tolerated     Discharge Instructions    Hold aspirin for now until after surgery  See primary care provider next week  No alcohol while on Flagyl antibiotics  Complete course of Flagyl/Metronidizole and Ciprofloxacin antibiotics     Follow-up and recommended labs and tests     Follow up with Colorectal surgery in ~1-2 weeks to discuss surgery. They will call you.  See primary care doctor for preoperative visit 1-2 days after surgery visit     Full Code     Diet    Follow this diet upon discharge:   -per surgery, full liquid diet for 7 days then low fiber diet until surgery.     Discharge Medications   Discharge Medication List as of 6/18/2019 11:10 AM      CONTINUE these medications which have CHANGED    Details   ciprofloxacin (CIPRO) 500 MG tablet Take 1 tablet (500 mg) by mouth 2 times daily for 12 days, Disp-14 tablet, R-0, E-Prescribe      metroNIDAZOLE (FLAGYL) 500 MG tablet Take 1 tablet (500 mg) by mouth 3 times daily for 12 days, Disp-21 tablet, R-0, E-Prescribe      oxyCODONE-acetaminophen (PERCOCET) 5-325 MG tablet Take 1 tablet by mouth every 6 hours as needed for pain, Disp-5 tablet, R-0, Local Print      senna-docusate (SENOKOT-S/PERICOLACE) 8.6-50 MG tablet Take 1-2 tablets by mouth 2 times daily as needed for constipation, Disp-30 tablet, R-1, E-Prescribe         CONTINUE these medications which have NOT CHANGED    Details   hydrochlorothiazide (HYDRODIURIL) 25 MG tablet Take 1 tablet (25 mg) by mouth every morning, Disp-90 tablet, R-3, E-PrescribeNote change from Losartan HCT      losartan (COZAAR) 100 MG tablet Take 1 tablet (100 mg) by mouth every morning, Disp-90 tablet, R-3, E-PrescribeNote change from Losartan HCT         STOP taking these medications       aspirin 81 MG EC tablet Comments:   Reason for Stopping:         diphenhydrAMINE (BENADRYL) 25 MG tablet Comments:   Reason for Stopping:              Allergies   Allergies   Allergen Reactions     Lisinopril      Cough with Lisinopril     Penicillin [Penicillins] Hives     Data   Most Recent 3 CBC's:  Recent Labs   Lab Test 06/17/19  0823 06/16/19  0800 06/15/19  1113   WBC 6.6 8.7 11.4*   HGB 13.6 13.1* 13.9   MCV 97 97 97    242 261      Most Recent 3 BMP's:  Recent Labs   Lab Test 06/18/19  0708 06/17/19  0823 06/16/19  0800    134 136   POTASSIUM 3.4 4.3 3.6   CHLORIDE 105 103 103   CO2 25 25 26   BUN 9 17 24   CR 0.83 0.92 1.14   ANIONGAP 8 6 7   HARDY 8.6 9.3 8.8   * 89 94     Most Recent 2 LFT's:  Recent Labs   Lab Test 06/15/19  1113 12/16/18  1345   AST 8 14   ALT 17 17   ALKPHOS 68 65   BILITOTAL 1.7* 0.8     Most Recent INR's and Anticoagulation Dosing History:  Anticoagulation Dose History     Recent Dosing and Labs Latest Ref Rng & Units 2/6/2014 6/17/2019    INR 0.86 - 1.14 0.95 1.20(H)        Most Recent 3 Troponin's:No lab results found.  Most Recent Cholesterol Panel:  Recent Labs   Lab Test 02/26/18  0811   CHOL 205*   *   HDL 69   TRIG 74     Most Recent 6 Bacteria Isolates From Any Culture (See EPIC Reports for Culture Details):No lab results found.  Most Recent TSH, T4 and A1c Labs:No lab results found.  Results for orders placed or performed during the hospital encounter of 06/15/19   CT Abdomen Pelvis w Contrast    Narrative    CT ABDOMEN AND PELVIS WITH CONTRAST   6/15/2019 12:10 PM     HISTORY: Abdominal pain.    TECHNIQUE: 86 mL Isovue-370 IV were administered. After contrast  administration, volumetric helical sections were acquired from the  lung bases to the ischial tuberosities. Coronal images were also  reconstructed. Radiation dose for this scan was reduced using  automated exposure control, adjustment of the mA and/or kV according  to patient size, or iterative reconstruction technique.    COMPARISON: CT of the abdomen and pelvis performed 12/16/2018.     FINDINGS: Colonic diverticulosis. Focal bowel  wall thickening in the  mid sigmoid colon with mild surrounding fat stranding consistent with  acute diverticulitis. A fluid and gas collection adjacent to the  thickened sigmoid colon on the right likely represents a diverticular  abscess and measures approximately 3.7 x 3.4 x 5 cm (series 2 image  59). This diverticular abscess abuts a loop of distal small bowel and  appears to cause moderate dilatation of several loops of small bowel  (series 2 image 54). There is a small amount of nonspecific free fluid  in the pelvis.  The uterus is not seen and is likely surgically  absent. Moderate atherosclerotic aortoiliac calcification. The liver,  gallbladder, spleen, adrenal glands, pancreas, and kidneys are  unremarkable. No hydronephrosis. No free intraperitoneal air is  identified. The visualized lung bases are clear.  Degenerative changes  are noted in the visualized thoracolumbar spine.      Impression    IMPRESSION:   1. Bowel wall thickening with surrounding fat stranding in the mid  sigmoid colon is consistent with acute diverticulitis.  2. An adjacent 5 cm fluid and gas collection likely represents a  diverticular abscess.  3. This suspected diverticular abscess abuts a loop of distal small  bowel, in the right lower quadrant, and likely causes a distal small  bowel obstruction. A coloenteric fistula in this location cannot be  excluded.  4. Trace amount of free fluid in the pelvis is nonspecific but is most  likely related to diverticulitis and small bowel obstruction.      MERLYN WINSLOW MD

## 2019-06-18 NOTE — DISCHARGE INSTRUCTIONS
1. Read and refer to the attached education sheets on Diverticulitis, Full liquid diet, low fiber diet, Flagyl, and Ciprofloxacin for additional discharge instructions.  2. Call MD if having uncontrolled nausea, vomiting,  abdominal pain not controlled with ambulation or pain meds, blood in stools, constipation, or temp >100.4

## 2019-06-19 ENCOUNTER — TELEPHONE (OUTPATIENT)
Dept: INTERNAL MEDICINE | Facility: CLINIC | Age: 64
End: 2019-06-19

## 2019-06-19 NOTE — TELEPHONE ENCOUNTER
"Hospital/TCU/ED for chronic condition Discharge Protocol    \"Hi, my name is Becka Wright, a registered nurse, and I am calling from [facility].  I am calling to follow up and see how things are going for you after your recent emergency visit/hospital/TCU stay.\"    Tell me how you are doing now that you are home?\" better      Discharge Instructions    \"Let's review your discharge instructions.  What is/are the follow-up recommendations?  Pt. Response: will be following up with surgeon to schedule surgery    \"Has an appointment with your primary care provider been scheduled?\"   will call back after surgery is scheduled to set up preop px    \"When you see the provider, I would recommend that you bring your medications with you.\"    Medications    \"Tell me what changed about your medicines when you discharged?\"    Changes to chronic meds?    0-1    \"What questions do you have about your medications?\"    None     New diagnoses of heart failure, COPD, diabetes, or MI?    No              Medication reconciliation completed? Yes  Was MTM referral placed (*Make sure to put transitions as reason for referral)?   No    Call Summary    \"What questions or concerns do you have about your recent visit and your follow-up care?\"     none    \"If you have questions or things don't continue to improve, we encourage you contact us through the main clinic number (give number).  Even if the clinic is not open, triage nurses are available 24/7 to help you.     We would like you to know that our clinic has extended hours (provide information).  We also have urgent care (provide details on closest location and hours/contact info)\"      \"Thank you for your time and take care!\"             "

## 2019-07-08 ENCOUNTER — TRANSFERRED RECORDS (OUTPATIENT)
Dept: HEALTH INFORMATION MANAGEMENT | Facility: CLINIC | Age: 64
End: 2019-07-08

## 2019-07-17 ENCOUNTER — OFFICE VISIT (OUTPATIENT)
Dept: INTERNAL MEDICINE | Facility: CLINIC | Age: 64
End: 2019-07-17
Payer: MEDICARE

## 2019-07-17 VITALS
OXYGEN SATURATION: 95 % | WEIGHT: 167 LBS | BODY MASS INDEX: 26.95 KG/M2 | RESPIRATION RATE: 14 BRPM | SYSTOLIC BLOOD PRESSURE: 102 MMHG | DIASTOLIC BLOOD PRESSURE: 60 MMHG | TEMPERATURE: 97.8 F | HEART RATE: 70 BPM

## 2019-07-17 DIAGNOSIS — K57.32 DIVERTICULITIS OF COLON: ICD-10-CM

## 2019-07-17 DIAGNOSIS — Z01.818 PREOP GENERAL PHYSICAL EXAM: Primary | ICD-10-CM

## 2019-07-17 DIAGNOSIS — I10 ESSENTIAL HYPERTENSION, BENIGN: ICD-10-CM

## 2019-07-17 LAB
ANION GAP SERPL CALCULATED.3IONS-SCNC: 3 MMOL/L (ref 3–14)
BUN SERPL-MCNC: 18 MG/DL (ref 7–30)
CALCIUM SERPL-MCNC: 9.5 MG/DL (ref 8.5–10.1)
CHLORIDE SERPL-SCNC: 108 MMOL/L (ref 94–109)
CO2 SERPL-SCNC: 31 MMOL/L (ref 20–32)
CREAT SERPL-MCNC: 1.06 MG/DL (ref 0.66–1.25)
GFR SERPL CREATININE-BSD FRML MDRD: 73 ML/MIN/{1.73_M2}
GLUCOSE SERPL-MCNC: 84 MG/DL (ref 70–99)
POTASSIUM SERPL-SCNC: 4.1 MMOL/L (ref 3.4–5.3)
SODIUM SERPL-SCNC: 142 MMOL/L (ref 133–144)

## 2019-07-17 PROCEDURE — 80048 BASIC METABOLIC PNL TOTAL CA: CPT | Performed by: INTERNAL MEDICINE

## 2019-07-17 PROCEDURE — 36415 COLL VENOUS BLD VENIPUNCTURE: CPT | Performed by: INTERNAL MEDICINE

## 2019-07-17 PROCEDURE — 99215 OFFICE O/P EST HI 40 MIN: CPT | Performed by: INTERNAL MEDICINE

## 2019-07-17 PROCEDURE — 93000 ELECTROCARDIOGRAM COMPLETE: CPT | Performed by: INTERNAL MEDICINE

## 2019-07-17 NOTE — PATIENT INSTRUCTIONS
PRE-OPERATIVE INSTRUCTIONS:     *  Contact your surgeon if there is any change in your health. This includes signs of fection, such as cold or flu (such as a sore throat, runny nose, cough, rash or fever).    *  Stop aspirin of any kind for 7 days before procedure.   (even low dose daily aspirin).    *  No NSAIDs (Motrin, Advil, ibuprofen, Aleve, etc) within 5-7 days of surgery.    *  Tylenol (acetaminophen) OK to take if needed for pain or headache.  Follow instructions on the bottle    *  Stop any Fish Oil or vitamin E supplements for 7 days prior to surgery because these can affect platelet function.      *  Prepare your body as instructed by the surgery clinic.  If instructed, Take a shower or bath the night before surgery. Use any special soaps or cleaning instructions according to instructions from your surgeon.  If you do not have soap from your surgeon, use your regular soap. Do not shave or scrub the surgery site.  Wear clean pajamas and have clean sheets on your bed     *  ON THE MORNING OF SURGERY:     --Do NOT take Losartan or HCTZ     *  Resume all medications at the same doses after surgery, unless instructed otherwise by the medical staff.     *  Attend all follow up appointments with the surgeons (and/or therapists if applicable) as instructed.     *  Contact the surgeon's office for any specific questions about after-surgery cares and follow up instructions.        IF YOU REQUIRE NARCOTIC PAIN MEDICATION AFTER YOUR SURGERY:    --Take the narcotic pain medication exactly as prescribed, and use the absolute lowest dose needed for pain control.   The goal of pain medication is not complete pain relief, aim to just make it manageable.    --Beware of drowsiness, nausea, vomiting, when taking this medication.  Do not drive, or operate dangerous equipment after taking this.    --The main side effects from narcotic pain medication can also include intestinal side effects including nausea, vomiting,  constipation, or diarrhea.    --If your pain is not able to be controlled with the pain medication supplied to you, contact the surgeons because uncontrolled pain can be a sign of possible surgical complications.    --In case of constipation from pain medications, take over the counter Miralax powder or stool softner Senokot.  If you have a history of constipation with narcotic pain medication, consider taking Miralax powder on any day that you take a pain tablet.

## 2019-07-17 NOTE — PROGRESS NOTES
[address] [address]  [phone]  Dept: [phone]    PRE-OP EVALUATION:  Today's date: 2019    Juancarlos Ma (: 1955) presents for pre-operative evaluation assessment as requested by Dr. Grimes.  He requires evaluation and anesthesia risk assessment prior to undergoing surgery/procedure for treatment of recurrent diverticulitis, scheduled for partial colectomy.    Proposed Surgery/ Procedure:removal of part of colon  colon  Date of Surgery/ Procedure:   Time of Surgery/ Procedure: 9:30   Hospital/Surgical Facility: [facility]   Fax number for surgical facility: [facility]   Primary Physician: Wili Arevalo  Type of Anesthesia Anticipated: General    Patient has a Health Care Directive or Living Will:  NO    1. NO - Do you have a history of heart attack, stroke, stent, bypass or surgery on an artery in the head, neck, heart or legs?  2. NO - Do you ever have any pain or discomfort in your chest?  3. NO - Do you have a history of  Heart Failure?  4. NO - Are you troubled by shortness of breath when: walking on the level, up a slight hill or at night?  5. NO - Do you currently have a cold, bronchitis or other respiratory infection?  6. NO - Do you have a cough, shortness of breath or wheezing?  7. NO - Do you sometimes get pains in the calves of your legs when you walk?  8. NO - Do you or anyone in your family have previous history of blood clots?  9. NO - Do you or does anyone in your family have a serious bleeding problem such as prolonged bleeding following surgeries or cuts?  10. NO - Have you ever had problems with anemia or been told to take iron pills?  11. NO - Have you had any abnormal blood loss such as black, tarry or bloody stools, or abnormal vaginal bleeding?  12. NO - Have you ever had a blood transfusion?  13. NO - Have you or any of your relatives ever had problems with anesthesia?  14. NO - Do you have  sleep apnea, excessive snoring or daytime drowsiness?  15. NO - Do you have any prosthetic heart valves?  16. NO - Do you have prosthetic joints?  17. NO - Is there any chance that you may be pregnant?      HPI:     HPI related to upcoming procedure: recurrent episodes of significant diverticulitis      *  No recent infectious illnesses.    *  No recent cardiac or pulmonary issues or symptoms.    *  No problems performing vigorous physical activity, no changes in exercise tolerance.    *  No personal or family history of anesthesia complications.    *  No personal or family history of bleeding or clotting disorders.         MEDICAL HISTORY:     Patient Active Problem List    Diagnosis Date Noted     Diverticulitis 06/15/2019     Priority: Medium     Bowel obstruction (H) 12/16/2018     Priority: Medium     Fracture of fifth metacarpal bone of left hand 11/07/2017     Priority: Medium     intra-articular left 5th metacarpal base fracture with minimally dispalced left radial styloid fracture; no operative management       Diverticulitis of large intestine with perforation 02/22/2014     Priority: Medium     Prostate CA (H) 06/10/2013     Priority: Medium     Prostate cancer (H) 06/04/2013     Priority: Medium     ACP (advance care planning) 01/22/2013     Priority: Medium     Discussed advance care planning with patient; however, patient declined at this time. 1/22/2013        Diverticulosis of colon 10/02/2012     Priority: Medium     incidental sigmoid diverticulosis seen on routine colonoscopy, no h/o diverticulitis       Serrated adenoma of colon 10/02/2012     Priority: Medium     2 sessile serrated adenomatous polyps removed at colonoscopy; 1 tubular adenoma       Tobacco use disorder 04/09/2012     Priority: Medium     HYPERLIPIDEMIA LDL GOAL <130 10/31/2010     Priority: Medium     Elevated PSA 06/10/2009     Priority: Medium     Essential hypertension, benign 12/01/2005     Priority: Medium      Past Medical  History:   Diagnosis Date     Diverticulosis of colon 10/2/12    incidental sigmoid diverticulosis seen on routine colonoscopy, no h/o diverticulitis     Essential hypertension, benign      Fracture of fifth metacarpal bone of left hand 2017    intra-articular left 5th metacarpal base fracture with minimally dispalced left radial styloid fracture; no operative management     Prostate cancer (H)      Serrated adenoma of colon 10/2/12    2 sessile serrated adenomatous polyps removed at colonoscopy; 1 tubular adenoma     Past Surgical History:   Procedure Laterality Date     DAVINCI PROSTATECTOMY  6/10/2013    Procedure: DAVINCI PROSTATECTOMY;  ROBOTIC ASSIST PROSTATECTOMY;  Surgeon: Damon Jarrell MD;  Location: [facility] OR     HC TOOTH EXTRACTION W/FORCEP      4 wisdom teeth removed without complication     Current Outpatient Medications   Medication Sig Dispense Refill     hydrochlorothiazide (HYDRODIURIL) 25 MG tablet Take 1 tablet (25 mg) by mouth every morning 90 tablet 3     losartan (COZAAR) 100 MG tablet Take 1 tablet (100 mg) by mouth every morning 90 tablet 3     senna-docusate (SENOKOT-S/PERICOLACE) 8.6-50 MG tablet Take 1-2 tablets by mouth 2 times daily as needed for constipation 30 tablet 1     OTC products: None, except as noted above and no recent use of OTC ASA, NSAIDS or Steroids    Allergies   Allergen Reactions     Lisinopril      Cough with Lisinopril     Penicillin [Penicillins] Hives      Latex Allergy: NO    Social History     Tobacco Use     Smoking status: Former Smoker     Packs/day: 0.50     Years: 40.00     Pack years: 20.00     Types: Cigarettes     Last attempt to quit: 2014     Years since quittin.6     Smokeless tobacco: Never Used     Tobacco comment: quit    Substance Use Topics     Alcohol use: Yes     Alcohol/week: 10.0 oz     Types: 20 Cans of beer per week     Comment: daily     History   Drug Use No       REVIEW OF SYSTEMS:   Constitutional,  neuro, ENT, endocrine, pulmonary, cardiac, gastrointestinal, genitourinary, musculoskeletal, integument and psychiatric systems are negative, except as otherwise noted.    EXAM:   There were no vitals taken for this visit.  GENERAL alert and no distress  EYES:  Normal sclera,conjunctiva, EOMI  HENT: oral and posterior pharynx without lesions or erythema, facies symmetric  NECK: Neck supple. No LAD, without thyroidmegaly.  RESP: Clear to ausculation bilaterally without wheezes or crackles. Normal BS in all fields.  CV: RRR normal S1S2 without murmurs, rubs or gallops.  LYMPH: no cervical lymph adenopathy appreciated  MS: extremities- no gross deformities of the visible extremities noted,   EXT:  no lower extremity edema  PSYCH: Alert and oriented times 3; speech- coherent  SKIN:  No obvious significant skin lesions on visible portions of face   ABD:  Soft, nontedner, normal bowel sounds.     DIAGNOSTICS:   EKG (7/17/19): appears normal, NSR, normal axis, normal intervals, no acute ST/T changes c/w ischemia, no LVH by voltage criteria, unchanged from previous tracings    Recent Labs   Lab Test 06/18/19  0708 06/17/19  1017 06/17/19  0823 06/16/19  0800  02/06/14  1745   HGB  --   --  13.6 13.1*   < > 14.7   PLT  --   --  234 242   < > 270   INR  --  1.20*  --   --   --  0.95     --  134 136   < > 139   POTASSIUM 3.4  --  4.3 3.6   < > 4.2   CR 0.83  --  0.92 1.14   < > 0.84    < > = values in this interval not displayed.      LABS:    Component      Latest Ref Rng & Units 6/17/2019 6/18/2019 7/17/2019   Sodium      133 - 144 mmol/L 134 138 142   Potassium      3.4 - 5.3 mmol/L 4.3 3.4 4.1   Chloride      94 - 109 mmol/L 103 105 108   Carbon Dioxide      20 - 32 mmol/L 25 25 31   Anion Gap      3 - 14 mmol/L 6 8 3   Glucose      70 - 99 mg/dL 89 106 (H) 84   Urea Nitrogen      7 - 30 mg/dL 17 9 18   Creatinine      0.66 - 1.25 mg/dL 0.92 0.83 1.06   GFR Estimate      >60 mL/min/1.73:m2 86 >90 73   GFR Estimate If  Black      >60 mL/min/1.73:m2 >90 >90 85   Calcium      8.5 - 10.1 mg/dL 9.3 8.6 9.5   WBC      4.0 - 11.0 10e9/L 6.6     RBC Count      4.4 - 5.9 10e12/L 4.15 (L)     Hemoglobin      13.3 - 17.7 g/dL 13.6     Hematocrit      40.0 - 53.0 % 40.1     MCV      78 - 100 fl 97     MCH      26.5 - 33.0 pg 32.8     MCHC      31.5 - 36.5 g/dL 33.9     RDW      10.0 - 15.0 % 12.6     Platelet Count      150 - 450 10e9/L 234       IMPRESSION:   Reason for surgery/procedure: recurrenet diverticulitis    Diagnosis/reason for consult: '    1. Preop general physical exam    2. Essential hypertension, benign    3. Diverticulitis of colon         The proposed surgical procedure is considered INTERMEDIATE risk.    REVISED CARDIAC RISK INDEX  The patient has the following serious cardiovascular risks for perioperative complications such as (MI, PE, VFib and 3  AV Block):  No serious cardiac risks  INTERPRETATION: 1 risks: Class II (low risk - 0.9% complication rate)    The patient has the following additional risks for perioperative complications:  No identified additional risks      ICD-10-CM    1. Preop general physical exam Z01.818 EKG 12-lead complete w/read - Clinics   2. Essential hypertension, benign I10        RECOMMENDATIONS:       Cardiovascular Risk  Performs 4 METs exercise without symptoms (Light housework (dusting, washing dishes), Climb a flight of stairs and Walk on level ground at 15 minutes per mile (4 miles/hour)) .       Pulmonary Risk  No active pulmonary issues at this time.       --Patient is to take all scheduled medications on the day of surgery EXCEPT for modifications listed below.    Anticoagulant or Antiplatelet Medication Use  No ASA or NSAIDs within 7 days of surgery         ACE Inhibitor or Angiotensin Receptor Blocker (ARB) Use  Ace inhibitor or Angiotensin Receptor Blocker (ARB) and should HOLD this medication for the 24 hours prior to surgery.      APPROVAL GIVEN to proceed with proposed procedure,  without further diagnostic evaluation       Signed Electronically by: Wili Arevalo MD    Copy of this evaluation report is provided to requesting physician.    [facility] Preop Guidelines    Revised Cardiac Risk Index

## 2019-07-24 ENCOUNTER — ANESTHESIA EVENT (OUTPATIENT)
Dept: SURGERY | Facility: CLINIC | Age: 64
DRG: 331 | End: 2019-07-24
Payer: MEDICARE

## 2019-07-24 ENCOUNTER — HOSPITAL ENCOUNTER (INPATIENT)
Facility: CLINIC | Age: 64
LOS: 4 days | Discharge: HOME | DRG: 331 | End: 2019-07-28
Attending: COLON & RECTAL SURGERY | Admitting: COLON & RECTAL SURGERY
Payer: MEDICARE

## 2019-07-24 ENCOUNTER — ANESTHESIA (OUTPATIENT)
Dept: SURGERY | Facility: CLINIC | Age: 64
DRG: 331 | End: 2019-07-24
Payer: MEDICARE

## 2019-07-24 DIAGNOSIS — K57.92 DIVERTICULITIS: Primary | ICD-10-CM

## 2019-07-24 LAB
ABO + RH BLD: NORMAL
ABO + RH BLD: NORMAL
BLD GP AB SCN SERPL QL: NORMAL
BLOOD BANK CMNT PATIENT-IMP: NORMAL
CREAT SERPL-MCNC: 0.96 MG/DL (ref 0.66–1.25)
GFR SERPL CREATININE-BSD FRML MDRD: 82 ML/MIN/{1.73_M2}
HGB BLD-MCNC: 14.6 G/DL (ref 13.3–17.7)
PLATELET # BLD AUTO: 272 10E9/L (ref 150–450)
POTASSIUM SERPL-SCNC: 4.2 MMOL/L (ref 3.4–5.3)
SPECIMEN EXP DATE BLD: NORMAL

## 2019-07-24 PROCEDURE — 36415 COLL VENOUS BLD VENIPUNCTURE: CPT | Performed by: ANESTHESIOLOGY

## 2019-07-24 PROCEDURE — 25000128 H RX IP 250 OP 636: Performed by: NURSE ANESTHETIST, CERTIFIED REGISTERED

## 2019-07-24 PROCEDURE — 85018 HEMOGLOBIN: CPT | Performed by: ANESTHESIOLOGY

## 2019-07-24 PROCEDURE — 86850 RBC ANTIBODY SCREEN: CPT | Performed by: ANESTHESIOLOGY

## 2019-07-24 PROCEDURE — 0DB80ZZ EXCISION OF SMALL INTESTINE, OPEN APPROACH: ICD-10-PCS | Performed by: COLON & RECTAL SURGERY

## 2019-07-24 PROCEDURE — 25800030 ZZH RX IP 258 OP 636: Performed by: NURSE ANESTHETIST, CERTIFIED REGISTERED

## 2019-07-24 PROCEDURE — 8E0W4CZ ROBOTIC ASSISTED PROCEDURE OF TRUNK REGION, PERCUTANEOUS ENDOSCOPIC APPROACH: ICD-10-PCS | Performed by: COLON & RECTAL SURGERY

## 2019-07-24 PROCEDURE — 25800030 ZZH RX IP 258 OP 636: Performed by: ANESTHESIOLOGY

## 2019-07-24 PROCEDURE — 99231 SBSQ HOSP IP/OBS SF/LOW 25: CPT | Performed by: NURSE PRACTITIONER

## 2019-07-24 PROCEDURE — 40000170 ZZH STATISTIC PRE-PROCEDURE ASSESSMENT II: Performed by: COLON & RECTAL SURGERY

## 2019-07-24 PROCEDURE — 71000012 ZZH RECOVERY PHASE 1 LEVEL 1 FIRST HR: Performed by: COLON & RECTAL SURGERY

## 2019-07-24 PROCEDURE — 88307 TISSUE EXAM BY PATHOLOGIST: CPT | Performed by: COLON & RECTAL SURGERY

## 2019-07-24 PROCEDURE — 25000125 ZZHC RX 250: Performed by: NURSE PRACTITIONER

## 2019-07-24 PROCEDURE — 25000128 H RX IP 250 OP 636

## 2019-07-24 PROCEDURE — 25000132 ZZH RX MED GY IP 250 OP 250 PS 637: Mod: GY | Performed by: COLON & RECTAL SURGERY

## 2019-07-24 PROCEDURE — 37000009 ZZH ANESTHESIA TECHNICAL FEE, EACH ADDTL 15 MIN: Performed by: COLON & RECTAL SURGERY

## 2019-07-24 PROCEDURE — 88307 TISSUE EXAM BY PATHOLOGIST: CPT | Mod: 26 | Performed by: COLON & RECTAL SURGERY

## 2019-07-24 PROCEDURE — 25000128 H RX IP 250 OP 636: Performed by: COLON & RECTAL SURGERY

## 2019-07-24 PROCEDURE — 12000000 ZZH R&B MED SURG/OB

## 2019-07-24 PROCEDURE — 85049 AUTOMATED PLATELET COUNT: CPT | Performed by: ANESTHESIOLOGY

## 2019-07-24 PROCEDURE — 37000008 ZZH ANESTHESIA TECHNICAL FEE, 1ST 30 MIN: Performed by: COLON & RECTAL SURGERY

## 2019-07-24 PROCEDURE — 25800025 ZZH RX 258: Performed by: COLON & RECTAL SURGERY

## 2019-07-24 PROCEDURE — 25000125 ZZHC RX 250: Performed by: COLON & RECTAL SURGERY

## 2019-07-24 PROCEDURE — 36000085 ZZH SURGERY LEVEL 8 1ST 30 MIN: Performed by: COLON & RECTAL SURGERY

## 2019-07-24 PROCEDURE — 25000125 ZZHC RX 250: Performed by: NURSE ANESTHETIST, CERTIFIED REGISTERED

## 2019-07-24 PROCEDURE — P9041 ALBUMIN (HUMAN),5%, 50ML: HCPCS | Performed by: NURSE ANESTHETIST, CERTIFIED REGISTERED

## 2019-07-24 PROCEDURE — 84132 ASSAY OF SERUM POTASSIUM: CPT | Performed by: ANESTHESIOLOGY

## 2019-07-24 PROCEDURE — 86900 BLOOD TYPING SEROLOGIC ABO: CPT | Performed by: ANESTHESIOLOGY

## 2019-07-24 PROCEDURE — 27210794 ZZH OR GENERAL SUPPLY STERILE: Performed by: COLON & RECTAL SURGERY

## 2019-07-24 PROCEDURE — 82565 ASSAY OF CREATININE: CPT | Performed by: ANESTHESIOLOGY

## 2019-07-24 PROCEDURE — 25000128 H RX IP 250 OP 636: Performed by: ANESTHESIOLOGY

## 2019-07-24 PROCEDURE — 25000566 ZZH SEVOFLURANE, EA 15 MIN: Performed by: COLON & RECTAL SURGERY

## 2019-07-24 PROCEDURE — 0DTN0ZZ RESECTION OF SIGMOID COLON, OPEN APPROACH: ICD-10-PCS | Performed by: COLON & RECTAL SURGERY

## 2019-07-24 PROCEDURE — 36000087 ZZH SURGERY LEVEL 8 EA 15 ADDTL MIN: Performed by: COLON & RECTAL SURGERY

## 2019-07-24 PROCEDURE — 71000013 ZZH RECOVERY PHASE 1 LEVEL 1 EA ADDTL HR: Performed by: COLON & RECTAL SURGERY

## 2019-07-24 PROCEDURE — 86901 BLOOD TYPING SEROLOGIC RH(D): CPT | Performed by: ANESTHESIOLOGY

## 2019-07-24 PROCEDURE — 25000132 ZZH RX MED GY IP 250 OP 250 PS 637: Mod: GY | Performed by: NURSE PRACTITIONER

## 2019-07-24 RX ORDER — SODIUM CHLORIDE, SODIUM LACTATE, POTASSIUM CHLORIDE, CALCIUM CHLORIDE 600; 310; 30; 20 MG/100ML; MG/100ML; MG/100ML; MG/100ML
INJECTION, SOLUTION INTRAVENOUS CONTINUOUS
Status: DISCONTINUED | OUTPATIENT
Start: 2019-07-24 | End: 2019-07-24 | Stop reason: HOSPADM

## 2019-07-24 RX ORDER — ACETAMINOPHEN 325 MG/1
975 TABLET ORAL EVERY 8 HOURS
Status: DISPENSED | OUTPATIENT
Start: 2019-07-24 | End: 2019-07-27

## 2019-07-24 RX ORDER — LIDOCAINE 40 MG/G
CREAM TOPICAL
Status: DISCONTINUED | OUTPATIENT
Start: 2019-07-24 | End: 2019-07-28 | Stop reason: HOSPADM

## 2019-07-24 RX ORDER — VECURONIUM BROMIDE 1 MG/ML
INJECTION, POWDER, LYOPHILIZED, FOR SOLUTION INTRAVENOUS PRN
Status: DISCONTINUED | OUTPATIENT
Start: 2019-07-24 | End: 2019-07-24

## 2019-07-24 RX ORDER — ONDANSETRON 2 MG/ML
INJECTION INTRAMUSCULAR; INTRAVENOUS PRN
Status: DISCONTINUED | OUTPATIENT
Start: 2019-07-24 | End: 2019-07-24

## 2019-07-24 RX ORDER — DEXAMETHASONE SODIUM PHOSPHATE 4 MG/ML
INJECTION, SOLUTION INTRA-ARTICULAR; INTRALESIONAL; INTRAMUSCULAR; INTRAVENOUS; SOFT TISSUE PRN
Status: DISCONTINUED | OUTPATIENT
Start: 2019-07-24 | End: 2019-07-24

## 2019-07-24 RX ORDER — PROPOFOL 10 MG/ML
INJECTION, EMULSION INTRAVENOUS PRN
Status: DISCONTINUED | OUTPATIENT
Start: 2019-07-24 | End: 2019-07-24

## 2019-07-24 RX ORDER — ALBUMIN, HUMAN INJ 5% 5 %
SOLUTION INTRAVENOUS CONTINUOUS PRN
Status: DISCONTINUED | OUTPATIENT
Start: 2019-07-24 | End: 2019-07-24

## 2019-07-24 RX ORDER — ONDANSETRON 2 MG/ML
4 INJECTION INTRAMUSCULAR; INTRAVENOUS EVERY 30 MIN PRN
Status: DISCONTINUED | OUTPATIENT
Start: 2019-07-24 | End: 2019-07-24 | Stop reason: HOSPADM

## 2019-07-24 RX ORDER — ONDANSETRON 4 MG/1
4 TABLET, ORALLY DISINTEGRATING ORAL EVERY 30 MIN PRN
Status: DISCONTINUED | OUTPATIENT
Start: 2019-07-24 | End: 2019-07-24 | Stop reason: HOSPADM

## 2019-07-24 RX ORDER — ERYTHROMYCIN 5 MG/G
OINTMENT OPHTHALMIC 4 TIMES DAILY
Status: DISCONTINUED | OUTPATIENT
Start: 2019-07-24 | End: 2019-07-25

## 2019-07-24 RX ORDER — NALOXONE HYDROCHLORIDE 0.4 MG/ML
.1-.4 INJECTION, SOLUTION INTRAMUSCULAR; INTRAVENOUS; SUBCUTANEOUS
Status: DISCONTINUED | OUTPATIENT
Start: 2019-07-24 | End: 2019-07-28 | Stop reason: HOSPADM

## 2019-07-24 RX ORDER — INDOCYANINE GREEN AND WATER 25 MG
KIT INJECTION PRN
Status: DISCONTINUED | OUTPATIENT
Start: 2019-07-24 | End: 2019-07-24

## 2019-07-24 RX ORDER — MAGNESIUM HYDROXIDE 1200 MG/15ML
LIQUID ORAL PRN
Status: DISCONTINUED | OUTPATIENT
Start: 2019-07-24 | End: 2019-07-24 | Stop reason: HOSPADM

## 2019-07-24 RX ORDER — CIPROFLOXACIN 2 MG/ML
400 INJECTION, SOLUTION INTRAVENOUS SEE ADMIN INSTRUCTIONS
Status: DISCONTINUED | OUTPATIENT
Start: 2019-07-24 | End: 2019-07-24 | Stop reason: HOSPADM

## 2019-07-24 RX ORDER — BUPIVACAINE HYDROCHLORIDE 5 MG/ML
INJECTION, SOLUTION PERINEURAL PRN
Status: DISCONTINUED | OUTPATIENT
Start: 2019-07-24 | End: 2019-07-24 | Stop reason: HOSPADM

## 2019-07-24 RX ORDER — HYDROMORPHONE HYDROCHLORIDE 1 MG/ML
.3-.5 INJECTION, SOLUTION INTRAMUSCULAR; INTRAVENOUS; SUBCUTANEOUS EVERY 5 MIN PRN
Status: DISCONTINUED | OUTPATIENT
Start: 2019-07-24 | End: 2019-07-24 | Stop reason: HOSPADM

## 2019-07-24 RX ORDER — PROPOFOL 10 MG/ML
INJECTION, EMULSION INTRAVENOUS CONTINUOUS PRN
Status: DISCONTINUED | OUTPATIENT
Start: 2019-07-24 | End: 2019-07-24

## 2019-07-24 RX ORDER — EPHEDRINE SULFATE 50 MG/ML
INJECTION, SOLUTION INTRAMUSCULAR; INTRAVENOUS; SUBCUTANEOUS PRN
Status: DISCONTINUED | OUTPATIENT
Start: 2019-07-24 | End: 2019-07-24

## 2019-07-24 RX ORDER — HYDROMORPHONE HYDROCHLORIDE 1 MG/ML
.3-.5 INJECTION, SOLUTION INTRAMUSCULAR; INTRAVENOUS; SUBCUTANEOUS
Status: DISCONTINUED | OUTPATIENT
Start: 2019-07-24 | End: 2019-07-27

## 2019-07-24 RX ORDER — SODIUM CHLORIDE, SODIUM LACTATE, POTASSIUM CHLORIDE, CALCIUM CHLORIDE 600; 310; 30; 20 MG/100ML; MG/100ML; MG/100ML; MG/100ML
INJECTION, SOLUTION INTRAVENOUS CONTINUOUS PRN
Status: DISCONTINUED | OUTPATIENT
Start: 2019-07-24 | End: 2019-07-24

## 2019-07-24 RX ORDER — ASPIRIN 81 MG/1
81 TABLET ORAL DAILY
COMMUNITY
End: 2023-08-09

## 2019-07-24 RX ORDER — PROPARACAINE HYDROCHLORIDE 5 MG/ML
1 SOLUTION/ DROPS OPHTHALMIC ONCE
Status: DISCONTINUED | OUTPATIENT
Start: 2019-07-24 | End: 2019-07-24

## 2019-07-24 RX ORDER — SODIUM CHLORIDE, SODIUM LACTATE, POTASSIUM CHLORIDE, CALCIUM CHLORIDE 600; 310; 30; 20 MG/100ML; MG/100ML; MG/100ML; MG/100ML
INJECTION, SOLUTION INTRAVENOUS CONTINUOUS
Status: DISCONTINUED | OUTPATIENT
Start: 2019-07-24 | End: 2019-07-26

## 2019-07-24 RX ORDER — ACETAMINOPHEN 325 MG/1
975 TABLET ORAL ONCE
Status: COMPLETED | OUTPATIENT
Start: 2019-07-24 | End: 2019-07-24

## 2019-07-24 RX ORDER — FENTANYL CITRATE 50 UG/ML
25-50 INJECTION, SOLUTION INTRAMUSCULAR; INTRAVENOUS
Status: DISCONTINUED | OUTPATIENT
Start: 2019-07-24 | End: 2019-07-24 | Stop reason: HOSPADM

## 2019-07-24 RX ORDER — CIPROFLOXACIN 2 MG/ML
400 INJECTION, SOLUTION INTRAVENOUS
Status: COMPLETED | OUTPATIENT
Start: 2019-07-24 | End: 2019-07-24

## 2019-07-24 RX ORDER — GLYCOPYRROLATE 0.2 MG/ML
INJECTION, SOLUTION INTRAMUSCULAR; INTRAVENOUS PRN
Status: DISCONTINUED | OUTPATIENT
Start: 2019-07-24 | End: 2019-07-24

## 2019-07-24 RX ORDER — ONDANSETRON 2 MG/ML
4 INJECTION INTRAMUSCULAR; INTRAVENOUS EVERY 6 HOURS PRN
Status: DISCONTINUED | OUTPATIENT
Start: 2019-07-24 | End: 2019-07-28 | Stop reason: HOSPADM

## 2019-07-24 RX ORDER — ALVIMOPAN 12 MG/1
12 CAPSULE ORAL ONCE
Status: COMPLETED | OUTPATIENT
Start: 2019-07-24 | End: 2019-07-24

## 2019-07-24 RX ORDER — ERYTHROMYCIN 5 MG/G
OINTMENT OPHTHALMIC 4 TIMES DAILY
Status: DISCONTINUED | OUTPATIENT
Start: 2019-07-24 | End: 2019-07-24

## 2019-07-24 RX ORDER — NALOXONE HYDROCHLORIDE 0.4 MG/ML
.1-.4 INJECTION, SOLUTION INTRAMUSCULAR; INTRAVENOUS; SUBCUTANEOUS
Status: DISCONTINUED | OUTPATIENT
Start: 2019-07-24 | End: 2019-07-24

## 2019-07-24 RX ORDER — CELECOXIB 200 MG/1
200 CAPSULE ORAL ONCE
Status: COMPLETED | OUTPATIENT
Start: 2019-07-24 | End: 2019-07-24

## 2019-07-24 RX ORDER — OXYCODONE HYDROCHLORIDE 5 MG/1
5-10 TABLET ORAL EVERY 4 HOURS PRN
Status: DISCONTINUED | OUTPATIENT
Start: 2019-07-24 | End: 2019-07-28 | Stop reason: HOSPADM

## 2019-07-24 RX ORDER — HEPARIN SODIUM 5000 [USP'U]/.5ML
5000 INJECTION, SOLUTION INTRAVENOUS; SUBCUTANEOUS
Status: COMPLETED | OUTPATIENT
Start: 2019-07-24 | End: 2019-07-24

## 2019-07-24 RX ORDER — LOSARTAN POTASSIUM 100 MG/1
100 TABLET ORAL EVERY MORNING
Status: DISCONTINUED | OUTPATIENT
Start: 2019-07-25 | End: 2019-07-28 | Stop reason: HOSPADM

## 2019-07-24 RX ORDER — CIPROFLOXACIN 2 MG/ML
400 INJECTION, SOLUTION INTRAVENOUS EVERY 12 HOURS
Status: COMPLETED | OUTPATIENT
Start: 2019-07-24 | End: 2019-07-25

## 2019-07-24 RX ORDER — FENTANYL CITRATE 50 UG/ML
INJECTION, SOLUTION INTRAMUSCULAR; INTRAVENOUS PRN
Status: DISCONTINUED | OUTPATIENT
Start: 2019-07-24 | End: 2019-07-24

## 2019-07-24 RX ORDER — HYDROMORPHONE HYDROCHLORIDE 1 MG/ML
INJECTION, SOLUTION INTRAMUSCULAR; INTRAVENOUS; SUBCUTANEOUS
Status: COMPLETED
Start: 2019-07-24 | End: 2019-07-24

## 2019-07-24 RX ORDER — NEOSTIGMINE METHYLSULFATE 1 MG/ML
VIAL (ML) INJECTION PRN
Status: DISCONTINUED | OUTPATIENT
Start: 2019-07-24 | End: 2019-07-24

## 2019-07-24 RX ORDER — DIPHENHYDRAMINE HYDROCHLORIDE 50 MG/ML
12.5 INJECTION INTRAMUSCULAR; INTRAVENOUS EVERY 6 HOURS PRN
Status: DISCONTINUED | OUTPATIENT
Start: 2019-07-24 | End: 2019-07-28 | Stop reason: HOSPADM

## 2019-07-24 RX ORDER — LIDOCAINE HYDROCHLORIDE 20 MG/ML
INJECTION, SOLUTION INFILTRATION; PERINEURAL PRN
Status: DISCONTINUED | OUTPATIENT
Start: 2019-07-24 | End: 2019-07-24

## 2019-07-24 RX ORDER — ALVIMOPAN 12 MG/1
12 CAPSULE ORAL 2 TIMES DAILY
Status: DISCONTINUED | OUTPATIENT
Start: 2019-07-25 | End: 2019-07-27

## 2019-07-24 RX ADMIN — Medication 5 MG: at 15:42

## 2019-07-24 RX ADMIN — FENTANYL CITRATE 100 MCG: 50 INJECTION, SOLUTION INTRAMUSCULAR; INTRAVENOUS at 11:36

## 2019-07-24 RX ADMIN — CELECOXIB 200 MG: 200 CAPSULE ORAL at 10:10

## 2019-07-24 RX ADMIN — HYDROMORPHONE HYDROCHLORIDE 0.5 MG: 1 INJECTION, SOLUTION INTRAMUSCULAR; INTRAVENOUS; SUBCUTANEOUS at 18:29

## 2019-07-24 RX ADMIN — SODIUM CHLORIDE, POTASSIUM CHLORIDE, SODIUM LACTATE AND CALCIUM CHLORIDE: 600; 310; 30; 20 INJECTION, SOLUTION INTRAVENOUS at 13:41

## 2019-07-24 RX ADMIN — CIPROFLOXACIN 400 MG: 2 INJECTION INTRAVENOUS at 11:49

## 2019-07-24 RX ADMIN — HYDROMORPHONE HYDROCHLORIDE 0.5 MG: 1 INJECTION, SOLUTION INTRAMUSCULAR; INTRAVENOUS; SUBCUTANEOUS at 14:53

## 2019-07-24 RX ADMIN — DEXAMETHASONE SODIUM PHOSPHATE 4 MG: 4 INJECTION, SOLUTION INTRA-ARTICULAR; INTRALESIONAL; INTRAMUSCULAR; INTRAVENOUS; SOFT TISSUE at 11:47

## 2019-07-24 RX ADMIN — VECURONIUM BROMIDE 2 MG: 1 INJECTION, POWDER, LYOPHILIZED, FOR SOLUTION INTRAVENOUS at 13:45

## 2019-07-24 RX ADMIN — PROPOFOL 25 MCG/KG/MIN: 10 INJECTION, EMULSION INTRAVENOUS at 11:40

## 2019-07-24 RX ADMIN — PROPARACAINE HYDROCHLORIDE 1 DROP: 5 SOLUTION/ DROPS OPHTHALMIC at 21:40

## 2019-07-24 RX ADMIN — METRONIDAZOLE 500 MG: 500 INJECTION, SOLUTION INTRAVENOUS at 20:27

## 2019-07-24 RX ADMIN — ACETAMINOPHEN 975 MG: 325 TABLET, FILM COATED ORAL at 21:55

## 2019-07-24 RX ADMIN — MIDAZOLAM 1 MG: 1 INJECTION INTRAMUSCULAR; INTRAVENOUS at 11:29

## 2019-07-24 RX ADMIN — HEPARIN SODIUM 5000 UNITS: 10000 INJECTION, SOLUTION INTRAVENOUS; SUBCUTANEOUS at 11:45

## 2019-07-24 RX ADMIN — HYDROMORPHONE HYDROCHLORIDE 0.5 MG: 1 INJECTION, SOLUTION INTRAMUSCULAR; INTRAVENOUS; SUBCUTANEOUS at 12:03

## 2019-07-24 RX ADMIN — HYDROMORPHONE HYDROCHLORIDE 0.5 MG: 10 INJECTION, SOLUTION INTRAMUSCULAR; INTRAVENOUS; SUBCUTANEOUS at 18:29

## 2019-07-24 RX ADMIN — FENTANYL CITRATE 50 MCG: 50 INJECTION, SOLUTION INTRAMUSCULAR; INTRAVENOUS at 17:28

## 2019-07-24 RX ADMIN — ONDANSETRON 4 MG: 2 INJECTION INTRAMUSCULAR; INTRAVENOUS at 16:30

## 2019-07-24 RX ADMIN — ROCURONIUM BROMIDE 50 MG: 10 INJECTION INTRAVENOUS at 11:37

## 2019-07-24 RX ADMIN — SODIUM CHLORIDE, POTASSIUM CHLORIDE, SODIUM LACTATE AND CALCIUM CHLORIDE: 600; 310; 30; 20 INJECTION, SOLUTION INTRAVENOUS at 11:45

## 2019-07-24 RX ADMIN — PROPOFOL 20 MG: 10 INJECTION, EMULSION INTRAVENOUS at 16:10

## 2019-07-24 RX ADMIN — CIPROFLOXACIN 400 MG: 2 INJECTION, SOLUTION INTRAVENOUS at 23:15

## 2019-07-24 RX ADMIN — VECURONIUM BROMIDE 1 MG: 1 INJECTION, POWDER, LYOPHILIZED, FOR SOLUTION INTRAVENOUS at 15:15

## 2019-07-24 RX ADMIN — ERYTHROMYCIN 1 G: 5 OINTMENT OPHTHALMIC at 23:17

## 2019-07-24 RX ADMIN — FENTANYL CITRATE 50 MCG: 50 INJECTION, SOLUTION INTRAMUSCULAR; INTRAVENOUS at 17:51

## 2019-07-24 RX ADMIN — VECURONIUM BROMIDE 1 MG: 1 INJECTION, POWDER, LYOPHILIZED, FOR SOLUTION INTRAVENOUS at 14:30

## 2019-07-24 RX ADMIN — INDOCYANINE GREEN 5 MG: KIT INTRAVENOUS at 15:35

## 2019-07-24 RX ADMIN — ALBUMIN HUMAN: 0.05 INJECTION, SOLUTION INTRAVENOUS at 13:34

## 2019-07-24 RX ADMIN — HYDROMORPHONE HYDROCHLORIDE 0.5 MG: 1 INJECTION, SOLUTION INTRAMUSCULAR; INTRAVENOUS; SUBCUTANEOUS at 23:22

## 2019-07-24 RX ADMIN — ALVIMOPAN 12 MG: 12 CAPSULE ORAL at 10:10

## 2019-07-24 RX ADMIN — OXYCODONE HYDROCHLORIDE 5 MG: 5 TABLET ORAL at 21:55

## 2019-07-24 RX ADMIN — DEXTRAN 70, AND HYPROMELLOSE 2910 2 DROP: 1; 3 SOLUTION/ DROPS OPHTHALMIC at 22:11

## 2019-07-24 RX ADMIN — VECURONIUM BROMIDE 2 MG: 1 INJECTION, POWDER, LYOPHILIZED, FOR SOLUTION INTRAVENOUS at 13:09

## 2019-07-24 RX ADMIN — LIDOCAINE HYDROCHLORIDE 100 MG: 20 INJECTION, SOLUTION INFILTRATION; PERINEURAL at 11:36

## 2019-07-24 RX ADMIN — DEXMEDETOMIDINE HYDROCHLORIDE 20 MCG: 100 INJECTION, SOLUTION INTRAVENOUS at 12:38

## 2019-07-24 RX ADMIN — GLYCOPYRROLATE 0.6 MG: 0.2 INJECTION, SOLUTION INTRAMUSCULAR; INTRAVENOUS at 16:35

## 2019-07-24 RX ADMIN — Medication 10 MG: at 14:44

## 2019-07-24 RX ADMIN — INDOCYANINE GREEN 5 MG: KIT INTRAVENOUS at 12:48

## 2019-07-24 RX ADMIN — NEOSTIGMINE METHYLSULFATE 4 MG: 1 INJECTION, SOLUTION INTRAVENOUS at 16:35

## 2019-07-24 RX ADMIN — Medication 5 MG: at 12:59

## 2019-07-24 RX ADMIN — SODIUM CHLORIDE, POTASSIUM CHLORIDE, SODIUM LACTATE AND CALCIUM CHLORIDE: 600; 310; 30; 20 INJECTION, SOLUTION INTRAVENOUS at 10:11

## 2019-07-24 RX ADMIN — ACETAMINOPHEN 975 MG: 325 TABLET, FILM COATED ORAL at 10:09

## 2019-07-24 RX ADMIN — HYDROMORPHONE HYDROCHLORIDE 0.5 MG: 1 INJECTION, SOLUTION INTRAMUSCULAR; INTRAVENOUS; SUBCUTANEOUS at 20:26

## 2019-07-24 RX ADMIN — VECURONIUM BROMIDE 1 MG: 1 INJECTION, POWDER, LYOPHILIZED, FOR SOLUTION INTRAVENOUS at 16:10

## 2019-07-24 RX ADMIN — FLUORESCEIN SODIUM 1 STRIP: 1 STRIP OPHTHALMIC at 21:40

## 2019-07-24 RX ADMIN — METRONIDAZOLE 500 MG: 500 INJECTION, SOLUTION INTRAVENOUS at 11:49

## 2019-07-24 RX ADMIN — VECURONIUM BROMIDE 2 MG: 1 INJECTION, POWDER, LYOPHILIZED, FOR SOLUTION INTRAVENOUS at 12:24

## 2019-07-24 RX ADMIN — PROPOFOL 200 MG: 10 INJECTION, EMULSION INTRAVENOUS at 11:36

## 2019-07-24 RX ADMIN — MIDAZOLAM 1 MG: 1 INJECTION INTRAMUSCULAR; INTRAVENOUS at 11:31

## 2019-07-24 ASSESSMENT — ACTIVITIES OF DAILY LIVING (ADL): ADLS_ACUITY_SCORE: 15

## 2019-07-24 ASSESSMENT — COPD QUESTIONNAIRES: COPD: 0

## 2019-07-24 ASSESSMENT — LIFESTYLE VARIABLES: TOBACCO_USE: 1

## 2019-07-24 NOTE — ANESTHESIA PREPROCEDURE EVALUATION
Anesthesia Pre-Procedure Evaluation    Patient: Juancarlos Ma   MRN: [id number] : 1955          Preoperative Diagnosis: DIVERTICULITIS    Procedure(s):  ROBOTIC ASSISTED SIGMOID COLECTOMY    Past Medical History:   Diagnosis Date     Diverticulosis of colon 10/2/12    incidental sigmoid diverticulosis seen on routine colonoscopy, no h/o diverticulitis     Essential hypertension, benign      Fracture of fifth metacarpal bone of left hand 2017    intra-articular left 5th metacarpal base fracture with minimally dispalced left radial styloid fracture; no operative management     Prostate cancer (H)      Serrated adenoma of colon 10/2/12    2 sessile serrated adenomatous polyps removed at colonoscopy; 1 tubular adenoma     Past Surgical History:   Procedure Laterality Date     DAVINCI PROSTATECTOMY  6/10/2013    Procedure: DAVINCI PROSTATECTOMY;  ROBOTIC ASSIST PROSTATECTOMY;  Surgeon: Damon Jarrell MD;  Location: [facility] OR     HC TOOTH EXTRACTION W/FORCEP      4 wisdom teeth removed without complication       Anesthesia Evaluation     . Pt has had prior anesthetic. Type: General    No history of anesthetic complications          ROS/MED HX    ENT/Pulmonary:     (+)tobacco use (quit 1.5 months ago), Past use , . .   (-) asthma and COPD   Neurologic:      (-) CVA, TIA and Neuropathy   Cardiovascular:     (+) hypertension----. : . . . :. .      (-) CAD, irregular heartbeat/palpitations and stent   METS/Exercise Tolerance:     Hematologic:        (-) anemia   Musculoskeletal:         GI/Hepatic:     (+) Other GI/Hepatic      (-) GERD and liver disease   Renal/Genitourinary:      (-) renal disease   Endo:      (-) Type I DM, Type II DM and thyroid disease   Psychiatric:         Infectious Disease:  - neg infectious disease ROS       Malignancy:         Other:                          Physical Exam  Normal systems: cardiovascular, pulmonary and dental    Airway   Mallampati: II  TM distance: >3  "FB  Neck ROM: full    Dental   (+) missing  Comment: Lower molar    Cardiovascular   Rhythm and rate: regular and normal      Pulmonary    breath sounds clear to auscultation            Lab Results   Component Value Date    WBC 6.6 06/17/2019    HGB 14.6 07/24/2019    HCT 40.1 06/17/2019     06/17/2019     07/17/2019    POTASSIUM 4.1 07/17/2019    CHLORIDE 108 07/17/2019    CO2 31 07/17/2019    BUN 18 07/17/2019    CR 1.06 07/17/2019    GLC 84 07/17/2019    HARDY 9.5 07/17/2019    ALBUMIN 3.7 06/15/2019    PROTTOTAL 8.2 06/15/2019    ALT 17 06/15/2019    AST 8 06/15/2019    ALKPHOS 68 06/15/2019    BILITOTAL 1.7 (H) 06/15/2019    LIPASE 100 12/16/2018    PTT 37 06/17/2019    INR 1.20 (H) 06/17/2019    TSH 2.00 07/30/2007       Preop Vitals  BP Readings from Last 3 Encounters:   07/17/19 102/60   06/18/19 131/64   12/28/18 102/70    Pulse Readings from Last 3 Encounters:   07/17/19 70   06/18/19 61   12/28/18 66      Resp Readings from Last 3 Encounters:   07/17/19 14   06/18/19 16   12/19/18 18    SpO2 Readings from Last 3 Encounters:   07/17/19 95%   06/18/19 96%   12/28/18 95%      Temp Readings from Last 1 Encounters:   07/17/19 36.6  C (97.8  F) (Temporal)    Ht Readings from Last 1 Encounters:   06/15/19 1.676 m (5' 6\")      Wt Readings from Last 1 Encounters:   07/17/19 75.8 kg (167 lb)    Estimated body mass index is 26.95 kg/m  as calculated from the following:    Height as of 6/15/19: 1.676 m (5' 6\").    Weight as of 7/17/19: 75.8 kg (167 lb).       Anesthesia Plan      History & Physical Review  History and physical reviewed and following examination; no interval change.    ASA Status:  2 .    NPO Status:  > 6 hours    Plan for General and ETT with Intravenous induction. Maintenance will be Balanced.    PONV prophylaxis:  Ondansetron (or other 5HT-3), Dexamethasone or Solumedrol and Other (See comment) (propofol infusion)  Additional equipment: 2nd IV   propofol infusion      Postoperative " Care  Postoperative pain management:  IV analgesics, Oral pain medications and Multi-modal analgesia.      Consents  Anesthetic plan, risks, benefits and alternatives discussed with:  Patient..                 Primo Ram MD

## 2019-07-24 NOTE — ANESTHESIA CARE TRANSFER NOTE
Patient: Juancarlos Ma    Procedure(s):  ROBOTIC ASSISTED SIGMOID COLECTOMY, ROBOT ASSISTED SMALL BOWEL RESECTION, ROBOT ASSISTED MOBILIZATION OF THE SPLENIC FLEXURE    Diagnosis: DIVERTICULITIS  Diagnosis Additional Information: No value filed.    Anesthesia Type:   General, ETT     Note:  Airway :Face Mask  Patient transferred to:PACU  Handoff Report: Identifed the Patient, Identified the Reponsible Provider, Reviewed the pertinent medical history, Discussed the surgical course, Reviewed Intra-OP anesthesia mangement and issues during anesthesia, Set expectations for post-procedure period and Allowed opportunity for questions and acknowledgement of understanding      Vitals: (Last set prior to Anesthesia Care Transfer)    CRNA VITALS  7/24/2019 1629 - 7/24/2019 1704      7/24/2019             NIBP:  137/92  (Abnormal)     Pulse:  76    NIBP Mean:  124    SpO2:  100 %    Resp Rate (observed):  13    Resp Rate (set):  10                Electronically Signed By: YAIR West CRNA  July 24, 2019  5:04 PM

## 2019-07-24 NOTE — OR NURSING
PNDS criteria met.  Dr Brandon here to assess Mr. Ma; order received to transfer to [facility] for continued recovery.  Hand-off report to Aminta BORGES.  To 301-1 per cart with all belongings.  Will transport with Capnography monitoring.

## 2019-07-24 NOTE — PROGRESS NOTES
Admission medication history interview status for the 7/24/2019  admission is complete. See EPIC admission navigator for prior to admission medications     Medication history source reliability:Good    Medication history interview source(s):Patient    Medication history resources (including written lists, pill bottles, clinic record):written list    Primary pharmacy.[facility]    Additional medication history information not noted on PTA med list :  Patient completed pre-op antibiotics on 7/23/19 at 2300  Neomycin 1000mg tid X 1 day  Metronidazole 500mg tid x 1 day    Time spent in this activity: 45 minutes    Prior to Admission medications    Medication Sig Last Dose Taking? Auth Provider   aspirin 81 MG EC tablet Take 81 mg by mouth daily more than a week Yes Reported, Patient   hydrochlorothiazide (HYDRODIURIL) 25 MG tablet Take 1 tablet (25 mg) by mouth every morning 7/22/2019 at am Yes Wili Arevalo MD   losartan (COZAAR) 100 MG tablet Take 1 tablet (100 mg) by mouth every morning 7/23/2019 at am Yes Wili Arevalo MD

## 2019-07-24 NOTE — BRIEF OP NOTE
[facility]    Brief Operative Note    Pre-operative diagnosis: DIVERTICULITIS  Post-operative diagnosis same  Procedure: Procedure(s):  ROBOTIC ASSISTED SIGMOID COLECTOMY, ROBOT ASSISTED SMALL BOWEL RESECTION, ROBOT ASSISTED MOBILIZATION OF THE SPLENIC FLEXURE  Surgeon: Surgeon(s) and Role:     * Neel Grimes MD - Primary     * Richelle Cruz PA-C - Assisting  Anesthesia: General   Estimated blood loss: Less than 100 ml  Drains: None  Specimens:   ID Type Source Tests Collected by Time Destination   A : SMALL BOWEL RESECTION  Tissue Other SURGICAL PATHOLOGY EXAM Neel Grimes MD 7/24/2019  3:34 PM    B : SIGMOID COLON STAPLE LINE DISTAL Tissue Large Intestine, Sigmoid SURGICAL PATHOLOGY EXAM Neel Grimes MD 7/24/2019  3:38 PM      Findings:   Enterotomy, serosal tear, dural tear, or laceration secondary to the nature of the procedure, that was recognized and repaired.  Complications: None.  Implants:  * No implants in log *       IVF: 2500  UOP: 175  Condition on discharge from OR: Satisfactory    Neel Grimes MD   [facility]   [phone].        ADDENDUM:    PATIENT DATA  Indicate Y or N:  Home O2 No  Hemodialysis  No  Transplant patient  No  Cirrhosis  No  Steroids in last 30 days  No  Immunomodulators in last 30 days  No  Anticoagulation at time of surgery  yes   List medication asa  Prior abdominal surgery  Yes  Pelvic irradiation  No    Albumin within 30 days if known unknown   Hgb within 30 days if known    Hemoglobin   Date Value Ref Range Status   07/24/2019 14.6 13.3 - 17.7 g/dL Final   ]  Cr within 30 days if known    Creatinine   Date Value Ref Range Status   07/17/2019 1.06 0.66 - 1.25 mg/dL Final   ]  There is no height or weight on file to calculate BMI.      OR DATA  Emergent  No   <24 hours  No   <1 week  No  Bowel Prep Yes  Antibiotics  Yes  DVT prophylaxis    Heparin  Yes   SCD  Yes   None  No  Drain  No  ASA (1,2,3,4)  3  OR time (min) 240 min  Stents  No  Transfuse >/= 2U  No  Anastomosis   Stapled  Yes   Handsewn  No  Leak Test    Positive  No   Negative  Yes   Not done  No

## 2019-07-25 LAB
ANION GAP SERPL CALCULATED.3IONS-SCNC: 6 MMOL/L (ref 3–14)
BUN SERPL-MCNC: 10 MG/DL (ref 7–30)
CALCIUM SERPL-MCNC: 8.2 MG/DL (ref 8.5–10.1)
CHLORIDE SERPL-SCNC: 108 MMOL/L (ref 94–109)
CO2 SERPL-SCNC: 26 MMOL/L (ref 20–32)
CREAT SERPL-MCNC: 0.98 MG/DL (ref 0.66–1.25)
ERYTHROCYTE [DISTWIDTH] IN BLOOD BY AUTOMATED COUNT: 14.4 % (ref 10–15)
GFR SERPL CREATININE-BSD FRML MDRD: 81 ML/MIN/{1.73_M2}
GLUCOSE SERPL-MCNC: 106 MG/DL (ref 70–99)
HCT VFR BLD AUTO: 34 % (ref 40–53)
HGB BLD-MCNC: 11.6 G/DL (ref 13.3–17.7)
MAGNESIUM SERPL-MCNC: 1.3 MG/DL (ref 1.6–2.3)
MCH RBC QN AUTO: 32.9 PG (ref 26.5–33)
MCHC RBC AUTO-ENTMCNC: 34.1 G/DL (ref 31.5–36.5)
MCV RBC AUTO: 96 FL (ref 78–100)
PHOSPHATE SERPL-MCNC: 3.3 MG/DL (ref 2.5–4.5)
PLATELET # BLD AUTO: 190 10E9/L (ref 150–450)
POTASSIUM SERPL-SCNC: 3.8 MMOL/L (ref 3.4–5.3)
RBC # BLD AUTO: 3.53 10E12/L (ref 4.4–5.9)
SODIUM SERPL-SCNC: 140 MMOL/L (ref 133–144)
WBC # BLD AUTO: 8.4 10E9/L (ref 4–11)

## 2019-07-25 PROCEDURE — 25000125 ZZHC RX 250: Performed by: NURSE PRACTITIONER

## 2019-07-25 PROCEDURE — 25000128 H RX IP 250 OP 636: Performed by: PHYSICIAN ASSISTANT

## 2019-07-25 PROCEDURE — 25800030 ZZH RX IP 258 OP 636: Performed by: COLON & RECTAL SURGERY

## 2019-07-25 PROCEDURE — 80048 BASIC METABOLIC PNL TOTAL CA: CPT | Performed by: COLON & RECTAL SURGERY

## 2019-07-25 PROCEDURE — 25000132 ZZH RX MED GY IP 250 OP 250 PS 637: Mod: GY | Performed by: COLON & RECTAL SURGERY

## 2019-07-25 PROCEDURE — 85027 COMPLETE CBC AUTOMATED: CPT | Performed by: COLON & RECTAL SURGERY

## 2019-07-25 PROCEDURE — 84100 ASSAY OF PHOSPHORUS: CPT | Performed by: COLON & RECTAL SURGERY

## 2019-07-25 PROCEDURE — 36415 COLL VENOUS BLD VENIPUNCTURE: CPT | Performed by: COLON & RECTAL SURGERY

## 2019-07-25 PROCEDURE — 25000128 H RX IP 250 OP 636: Performed by: COLON & RECTAL SURGERY

## 2019-07-25 PROCEDURE — 83735 ASSAY OF MAGNESIUM: CPT | Performed by: COLON & RECTAL SURGERY

## 2019-07-25 PROCEDURE — 12000000 ZZH R&B MED SURG/OB

## 2019-07-25 RX ORDER — KETOROLAC TROMETHAMINE 15 MG/ML
15 INJECTION, SOLUTION INTRAMUSCULAR; INTRAVENOUS EVERY 6 HOURS PRN
Status: DISCONTINUED | OUTPATIENT
Start: 2019-07-25 | End: 2019-07-28 | Stop reason: HOSPADM

## 2019-07-25 RX ADMIN — ALVIMOPAN 12 MG: 12 CAPSULE ORAL at 21:28

## 2019-07-25 RX ADMIN — LOSARTAN POTASSIUM 100 MG: 100 TABLET, FILM COATED ORAL at 08:14

## 2019-07-25 RX ADMIN — OXYCODONE HYDROCHLORIDE 10 MG: 5 TABLET ORAL at 04:06

## 2019-07-25 RX ADMIN — SODIUM CHLORIDE, POTASSIUM CHLORIDE, SODIUM LACTATE AND CALCIUM CHLORIDE: 600; 310; 30; 20 INJECTION, SOLUTION INTRAVENOUS at 05:59

## 2019-07-25 RX ADMIN — ENOXAPARIN SODIUM 40 MG: 40 INJECTION SUBCUTANEOUS at 12:22

## 2019-07-25 RX ADMIN — KETOROLAC TROMETHAMINE 15 MG: 15 INJECTION, SOLUTION INTRAMUSCULAR; INTRAVENOUS at 21:29

## 2019-07-25 RX ADMIN — METRONIDAZOLE 500 MG: 500 INJECTION, SOLUTION INTRAVENOUS at 12:14

## 2019-07-25 RX ADMIN — KETOROLAC TROMETHAMINE 15 MG: 15 INJECTION, SOLUTION INTRAMUSCULAR; INTRAVENOUS at 10:26

## 2019-07-25 RX ADMIN — ERYTHROMYCIN 1 G: 5 OINTMENT OPHTHALMIC at 08:15

## 2019-07-25 RX ADMIN — ACETAMINOPHEN 975 MG: 325 TABLET, FILM COATED ORAL at 21:28

## 2019-07-25 RX ADMIN — ALVIMOPAN 12 MG: 12 CAPSULE ORAL at 08:14

## 2019-07-25 RX ADMIN — OXYCODONE HYDROCHLORIDE 10 MG: 5 TABLET ORAL at 12:21

## 2019-07-25 RX ADMIN — METRONIDAZOLE 500 MG: 500 INJECTION, SOLUTION INTRAVENOUS at 03:59

## 2019-07-25 RX ADMIN — OXYCODONE HYDROCHLORIDE 10 MG: 5 TABLET ORAL at 21:29

## 2019-07-25 RX ADMIN — ACETAMINOPHEN 975 MG: 325 TABLET, FILM COATED ORAL at 05:53

## 2019-07-25 RX ADMIN — CIPROFLOXACIN 400 MG: 2 INJECTION, SOLUTION INTRAVENOUS at 12:16

## 2019-07-25 RX ADMIN — DIPHENHYDRAMINE HYDROCHLORIDE 12.5 MG: 50 INJECTION, SOLUTION INTRAMUSCULAR; INTRAVENOUS at 21:29

## 2019-07-25 RX ADMIN — HYDROMORPHONE HYDROCHLORIDE 0.5 MG: 1 INJECTION, SOLUTION INTRAMUSCULAR; INTRAVENOUS; SUBCUTANEOUS at 05:59

## 2019-07-25 RX ADMIN — OXYCODONE HYDROCHLORIDE 10 MG: 5 TABLET ORAL at 16:43

## 2019-07-25 RX ADMIN — ACETAMINOPHEN 975 MG: 325 TABLET, FILM COATED ORAL at 14:32

## 2019-07-25 RX ADMIN — OXYCODONE HYDROCHLORIDE 10 MG: 5 TABLET ORAL at 08:14

## 2019-07-25 ASSESSMENT — ACTIVITIES OF DAILY LIVING (ADL)
ADLS_ACUITY_SCORE: 15

## 2019-07-25 NOTE — PLAN OF CARE
A&Ox4. VSS on RA (1L NC while sleeping). Lap sites x7 liquid bandage WDL. BS present. -Flatus. Pain managed with scheduled tylenol and prn IV dilaudid and oxy. Denies N/V. LS clear. IS 1500. Eye drops prn for R eye desiccation. Velazquez with adequate output. Up SBA.

## 2019-07-25 NOTE — PLAN OF CARE
Pt got to floor around 1830. VVS on RA. Gave 0.5 IV dilaudid for 9/10 incision pain. IVF LR running at 75ml/hr. Laurence N/V. CMS intact. 7 incision sites on abd, all CDI, EVIE. Velazquez patent. BS absent. IS 1500 4x. Discharge pending progress, continue to monitor.

## 2019-07-25 NOTE — OP NOTE
Procedure Date: 07/24/2019      PREOPERATIVE DIAGNOSIS:  Recurrent diverticulitis with abscess.      POSTOPERATIVE DIAGNOSES:     1.  Recurrent diverticulitis with abscess.   2.  Interloop bowel adhesions with the small bowel adhesed to the sigmoid colon.      PROCEDURE PERFORMED:  Robotic-assisted sigmoid colectomy.   1.  Robotic-assisted small-bowel resection.   2.  Colorectal anastomosis.   3.  Small bowel anastomosis.   4.  Robotic mobilization of the splenic flexure.   5.  Intraoperative fluorescence angiography  6.  Laparoscopic assisted transversus abdominal plane block     SURGEON:  Neel Grimes MD.      ASSISTANT:  Richelle Cruz PA-C.      ANESTHESIA:  General.      ESTIMATED BLOOD LOSS:  100 mL.      SPECIMENS:   1.  Sigmoid colon staple line distal.   2.  Small bowel resection.      FINDINGS:  The small bowel was densely adherent to the sigmoid colon along the right side of the sigmoid colon.  In order to perform the operation, this required mobilization of the small bowel off the sigmoid colon and the intentional creation of an enterotomy to do so.  This therefore required a small bowel resection and primary anastomosis of this diseased portion of the small bowel.  There was a significant amount of inflammatory infiltration of the small bowel here.  There was no evidence of a mass in the sigmoid colon.  Sigmoid colon was chronically thickened, full and chronically inflamed.  We performed a lateral to medial mobilization of the sigmoid colon.  We performed a full mobilization of the splenic flexure.      INDICATIONS:  Juancarlos is a 64-year-old gentleman with a history of recurrent sigmoid diverticulitis with abscess.  He is presenting today for an elective resection given the recurrent nature of his disease.  The risks, benefits and alternatives of surgery including the risk of bleeding, infection, anastomotic leak, injury to adjacent structures, need for further surgery, need for a stoma, urinary tract  infection, DVT, complications of anesthesia, and even more serious complications were discussed.  He wishes to proceed.      DESCRIPTION OF PROCEDURE:  After obtaining informed consent, the patient was brought to the operating room, general anesthesia was induced and the patient intubated by the anesthesia service without difficulty.  He underwent a full prep preoperatively.  Velazquez catheter was placed under sterile conditions.  An orogastric tube was placed.  Arm was tucked at his side.  He was placed in the low modified lithotomy position.  He was secured to the table with tape across the chest as well as the use of a Pigazzi pad.  The abdomen was shaved, prepped and draped in the usual sterile fashion.  A timeout was undertaken, which identified the patient and the correct procedure.      We began by making a stab incision in the left upper quadrant.  Veress needle was then used to establish pneumoperitoneum.  We placed an 8 mm robotic trocar in the supraumbilical position.  We then surveyed the abdomen with robotic camera and a laparoscope.  There was no evidence of injury to the underlying viscera.  We placed 2 additional 8 mm trocars in the patient's left side of the abdomen, running roughly in a line transversely across the abdomen.  A 12 mm trocar was placed in the right lower quadrant and an 8 mm AirSeal trocar was placed in the right upper quadrant.  We then placed the patient in steep Trendelenburg position.  We docked the robot over the patient's left side.  In arm #1, we had a small grasping retractor, in arm #2 a tip up grasper, in arm #3 was the robotic camera and in arm #4 was the monopolar scissors, which was occasionally changed out for the vessel sealing device as well as the robotic stapler when needed.  Once the robot was docked and centered on the pelvis.  I then went to the console and Richelle Cruz stayed at the bedside.      I began by attempting to sweep the small bowel up out of the pelvis.   There was a loop of the ileum which was stuck down to the sigmoid colon.  I elected to mobilize some of the small bowel first before mobilizing the small bowel out.  Given the chronic nature of his disease, his colon was somewhat foreshortened and therefore, a medial to lateral approach would be difficult.  I then mobilized the colon in a lateral to medial approach with the use of the monopolar scissors to incise along the white line of Toldt and the peritoneal attachments with great care to avoid injury to the underlying retroperitoneal structures.  The left ureter was identified and swept posteriorly at all times and kept out of harm's way.  The dissection was taken up to the level of the splenic flexure and down to the level of the pelvis along the upper rectum.  With this mobilized in a lateral to medial fashion.  I then turned our attention to the medial dissection.  We began by mobilizing the small bowel off the diseased colon.  This was done with a combination of blunt and sharp dissection with the monopolar scissors.  In doing so, we created an intentional enterotomy to simply mobilize this inflamed loop of small bowel off of the diseased sigmoid colon.  In doing this, we recognized that a small portion of the small bowel would need to be resected given the enterotomy and the diseased bowel here.  Therefore, proximal points for proximal and distal transection were chosen.  The bowel was isolated with a combination of the use of the monopolar scissors as well as the vessel sealing device.  The intervening mesentery was then cleared with the vessel sealing device.        I then utilized the table motion technology and brought the patient out of steep Trendelenburg position into just a modest Trendelenburg position.  This allowed us to perform a robotic small-bowel resection.  The bowel was then transected proximally and distally with sequential firings of a 60 mm blue load robotic TRINO stapler.  The specimen  was later retrieved when we extracted the sigmoid colon.      The bowel was then lined up for a side-to-side functional end-to-end isoperistaltic anastomosis.  Stay sutures of 3-0 Vicryl were then placed utilizing a uGift SutureCut needle .  Antimesenteric enterotomies were made distally and then a 60 blue load TRINO stapler was fashioned within both limbs of the bowel and the stapler was closed and fired to create a side-to-side functional end-to-end anastomosis.      I then closed the common enterotomy with a 9 inch running Stratafix self-retaining suture.  As I was closing this, the suture broke about half way during the closure.  I should note that my stay sutures were removed in order to totally visualize the entirety of the common enterotomy.  I therefore began another suture from the opposite end and ran that towards the other suture.  Unfortunately, the suture broke as well and the remainder of the enterotomy was then closed with a running 3-0 Vicryl suture, which was secured at each end to both Stratafix sutures.  I then elected to perform a second layer of the anastomosis with interrupted 3-0 Vicryl sutures in a Lembert fashion to reinforce the anastomosis. The anastomosis was evaluated with the robotic Firefly technology using intraoperative fluorescence angiography.  Three mL of ICG was injected by anesthesia.  The anastomosis was well perfused.     We then turned our attention back towards the sigmoid colon.  The patient was placed back in a steep Trendelenburg position utilizing the table motion technology.  The superior hemorrhoidal artery was identified and elevated off the retroperitoneum and the peritoneum medial to it was incised with the scissor electrocautery.  This allowed us to isolate the superior hemorrhoidal vessel and the vessel was triply ligated and transected with the vessel sealing device.  This allowed us to mobilize along the right side of the sigmoid colon which was densely  adherent to the right hemipelvis.  This was done with a combination of blunt and sharp dissection and then to elevate the attachments of the bladder and anterior peritoneum to the sigmoid colon which were fairly easily taken with some blunt dissection with the vessel sealing device.  This allowed us to expose the upper rectum.  The peritoneum along the upper rectum was incised with the monopolar scissors.  This allowed us to then isolate the upper rectum with the vessel sealing device and the mesorectum here was transected with the vessel sealing device.  With the rectum here nicely isolated, it was transected with a single firing of a green load robotic 60 mm TRINO stapler.   We then noted a point at the descending-sigmoid colon junction which appeared to be a good spot for the anastomosis.  The mesentery here was divided with the vessel sealing device up to the level of the bowel wall.       We then turned our attention to performing a full mobilization of the splenic flexure.  The robot was undocked.  Under my direction, the robot boom was then spun 180 degrees to center on the upper abdomen.  We placed the camera in the port in the patient's left mid abdomen in the mid clavicular line. The vessel sealing device was placed in arm #4, which is in the left most lateral port, and the tip up grasper was placed in arm #2, which was placed in through the supraumbilical port.  This was done after the patient was placed in slight reverse Trendelenburg position with the left side up.  Through this instrumentation, we were able to mobilize the splenic flexure fully.  The omentum was retracted cephalad and the attachments of the omentum to the transverse colon were transected and sealed with the vessel sealing device.  This exposed the posterior wall of the stomach.  We carried our dissection to the splenic flexure, mobilizing the omentum off of the transverse colon and off of the descending colon.  This allowed us to take  the superior, lateral and posterior attachments of the splenic flexure, fully mobilizing the splenic flexure.  We then turned our attention to the cut edge of the mesentery along the descending colon.  This was done by elevating the transverse colon and exposing the cut edge of the mesentery.  The bare area of the mesentery was taken up to the level of the inferior mesenteric vein, but the vein was not transected.      At this point, we undocked the robot and it was pulled back.  We leveled the patient out and utilized the robotic camera as a laparoscope to identify the cut edge of the sigmoid colon specimen as well as the portion of the small bowel that was resected.  Graspers were placed on these structures and the pneumoperitoneum was evacuated.  A small transverse incision 3 fingerbreadths above the pubic symphysis was undertaken in a Pfannenstiel manner to extract the specimen.  The fascia was incised in a transverse fashion, elevated off the rectus muscle superiorly and inferiorly.  The peritoneum in the midline was incised and an Ezra wound retractor was placed here for excellent exposure.  This allowed us to extract the small bowel.  At this point in time, we also extracted the sigmoid colon.  The colon was quite thickened.      We felt that after exteriorization that we needed to resect just approximately 1-2 cm more proximal to our chosen point and therefore, the bowel was isolated here with electrocautery and a small portion of the intervening mesentery was taken between Carmalt clamps and controlled with Vicryl ties.  We then performed intraoperative fluorescence angiography  utilizing the Firefly technology with the robotic laparoscope.  An additional 3 ml of ICG was injected by anesthesia to evaluate the descending colonic conduit.  This also demonstrated excellent perfusion of the descending colon.  Therefore, with this excellent blood supply established, the descending colon was divided between  Jalen clamps and the specimen was sent off as sigmoid colon staple line distal.  The proximal bowel was prepared for anastomosis with a 2-0 double-armed Prolene placed in pursestring fashion around the cut edge of the bowel wall.  The bowel was then cleared of the fat near the anastomotic ring.  A second pursestring suture was placed here to reinforce the ring.  The colon was then placed back into the abdomen.      We then washed out the abdomen.  The aspirate was acceptable.  Prior to closing the Pfannenstiel incision, we also assessed our small bowel anastomosis and found it to be fully intact.  We had a complete 2-layer closure and the anastomosis was found to be widely patent. The peritoneum was closed with a running 2-0 Vicryl suture and the fascia was reapproximated with 2 running sutures of looped 0 PDS. Pneumoperitoneum was reestablished.      With pneumoperitoneum reestablished, we placed the patient in Trendelenburg position.  We utilized the robotic camera as a laparoscope.  I then went to the perineal field and passed sizers via the anus to the top of the rectal stump.  I then passed the stapler via the anus to the top of the rectal stump.  At this point, I handed the stapler over to Richelle Cruz after guiding it to the top of the rectal stump and I scrubbed back into the abdominal field.  Again, utilizing the robotic camera as a laparoscope, Richelle deployed the stapler under my direction, nicely deploying the spike just anterior to the staple line in its midportion.  I then identified the anvil and the descending colonic conduit to create the anastomosis.  This came down nicely into the pelvis without any tension.  after ensuring that the proximal bowel was in proper orientation, I  the anvil to the stapler.  The stapler was closed and fired and easily retrieved.  The anastomotic rings were fully intact.  A pneumatic leak test was performed by Richelle Cruz by insufflating the rectum with a  rigid proctoscope and after I filled the pelvis with saline with the laparoscopic suction , there was no evidence of anastomotic leak.  The rest of the fluid in the abdomen was then aspirated.  Richelle then rescrubbed back into the abdominal field.      We closed the right lower quadrant trocar site with a Duncan-Jackie device with an 0 Vicryl tie.  We performed a laparoscopic-assisted transversus abdominus plane block with a total of 30 mL of 0.5% Marcaine plain.  We closed the right upper quadrant trocar site with a Duncan-Jackie device with 0 Vicryl tie.      Pneumoperitoneum was evacuated.  The remaining trocars were removed.  Skin and subcutaneous fat was irrigated with saline and closed with 4-0 Monocryl in a subcuticular fashion.  The skin was also closed with Dermabond as a dressing.      The patient was placed in the supine position after the drapes were taken down.  Lap, sponge and needle counts were correct at the end of the case x2.         NEEL GRIMES MD             D: 2019   T: 2019   MT: MANOJ      Name:     LIVIA SEAY   MRN:      [id number]        Account:        [id number]   :      1955           Procedure Date: 2019      Document: [id number]       cc: Neel Grimes MD

## 2019-07-25 NOTE — PLAN OF CARE
A&Ox4. VSS on RA. Lap sites x7 liquid bandage WDL. BS present but hypoactive. - ralph passing Flatus. Pain managed with scheduled tylenol, PRN oxycodone, and IV Toradol. IV SL.  Denies N/V. LS clear. IS 1500. Eye drops prn for R eye desiccation, pt refused evening dose. Velazquez discontinued at 1445, still due to void. Discharge pending progress, continue to monitor.

## 2019-07-25 NOTE — PROGRESS NOTES
[facility]    House LOIDA Post-op Eye Pain  7/24/2019   Time Called: 1913    House LOIDA called for: Post-op Eye Pain.    Assessment & Plan     Right post-op eye pain secondary to corneal desiccation.   -Proparacaine ophthalmic (0.5%) two drops.  Followed by Fluorescin eye exam to confirm desiccation versus foreign body.      INTERVENTIONS:  Proparacaine/Alcaine ophthalmic solution (0.5%)  --2 drops prior to exam (initial rapid onset pain control/anesthesia needed to facilitate exam)  --Fluorescein dye application with NS from dye impregnated filter paper strip  --Pt informed possible yellow-orange coloration nasal secretions in next day  --NS irrigation after fluorescein dye staining   -Erythromycin ophthalmic ointment (1.25 cm ribbon) now and QID while awake and QHS.  D/C after HS dose day of sx resolution.   -Hypromellarose/ArtificialTears Q1H PRN, may continue after symptom resolution.    -House Provider F/U in 24 hrs and PRN: Increasing/worsening symptoms, continued symptoms after 48 hrs or concerns/questions.     Discussed with and defer further cares to nursing and primary care service.     Interval History     Juancarlos Ma is a 64 year old male who was admitted on 7/24/2019 for elective  Colectomy, small bowel resection 2/2 diverticulitis.    Medical history significant for: Diverticulosis, HTN, prostate cancer    Code Status: Full Code    Allergies   Allergies   Allergen Reactions     Lisinopril      Cough with Lisinopril     Penicillin [Penicillins] Hives     Physical Exam   Vital Signs with Ranges:  Temp:  [95.2  F (35.1  C)-97.4  F (36.3  C)] 97.4  F (36.3  C)  Pulse:  [64-75] 65  Heart Rate:  [59-75] 66  Resp:  [9-15] 15  BP: (122-158)/(65-84) 131/77  SpO2:  [93 %-100 %] 97 %  I/O last 3 completed shifts:  In: 1950 [I.V.:1700]  Out: 110 [Urine:110]    Constitutional: Pt lying in bed, in no apparent distress, watching TV  EYE:  Mariya-orb    Ecchymoses No   Erythema No   Swelling  No   Vision     Visual acuity grossly preserved Yes   Both eyes open, unaffected eye covered    Yes   Lid    Di Yes   Atraumatic Yes   No erythema Yes   Ecchymoses No   Swelling  No   Conj    Clear Yes   Sclera    Normal Yes   White Yes   Non-icteric  Yes   EOM    Normal Yes   EOMI Yes   nystagmus  No   Pupil     PERRL Yes   Bilat Yes   Brisk Yes   Direct Yes   Cornea   Fluorescein dye Wood's lamp dye staining   faint  horizontal  4 mm wide  and 10-2 o'clock   Cross pupil/visual axis No     Time Spent on this Encounter   I spent 10 minutes on the unit/floor managing the care of Juancarlos Ma. Over 50% of my time was spent counseling the patient and/or coordinating care regarding services listed in this note.    YAIR Plummer [facility] LOIDA

## 2019-07-25 NOTE — ANESTHESIA POSTPROCEDURE EVALUATION
Patient: Juancarlos Ma    Procedure(s):  ROBOTIC ASSISTED SIGMOID COLECTOMY, ROBOT ASSISTED SMALL BOWEL RESECTION, ROBOT ASSISTED MOBILIZATION OF THE SPLENIC FLEXURE    Diagnosis:DIVERTICULITIS  Diagnosis Additional Information: No value filed.    Anesthesia Type:  General, ETT    Note:  Anesthesia Post Evaluation    Patient location during evaluation: PACU  Patient participation: Able to fully participate in evaluation  Level of consciousness: awake  Pain management: adequate  Airway patency: patent  Cardiovascular status: acceptable  Respiratory status: acceptable  Hydration status: acceptable  PONV: none     Anesthetic complications: None          Last vitals:  Vitals:    07/24/19 1928 07/24/19 2026 07/24/19 2040   BP: 131/77 142/78    Pulse:      Resp: 15 16 16   Temp:      SpO2: 97% 96%          Electronically Signed By: Becka Brandon  July 24, 2019  9:25 PM

## 2019-07-25 NOTE — PROGRESS NOTES
[facility]  PROGRESS NOTE    July 25, 2019  Post-op Day # 1 s/p robotic assisted sigmoid colectomy, small bowel resection and mobilization of splenic flexure    SUBJECTIVE:  Pt resting in bed upon arrival. Feeling OK. No nausea, tolerating full liquid diet. Pain 7/10 when resting, 9/10 when moving in bed. Has dangled at bedside. No flatus or BM yet.     OBJECTIVE:  Temp:  [95.2  F (35.1  C)-98.4  F (36.9  C)] 98.4  F (36.9  C)  Pulse:  [64-75] 71  Heart Rate:  [59-78] 71  Resp:  [9-16] 16  BP: (106-158)/(59-84) 106/59  SpO2:  [91 %-100 %] 91 %    Intake/Output Summary (Last 24 hours) at 7/25/2019 0845  Last data filed at 7/25/2019 0600  Gross per 24 hour   Intake 4248 ml   Output 1280 ml   Net 2968 ml       GENERAL:  Awake, alert, no acute distress, olmedo in with yellow, clearoutput  HEAD: Normocephalic atraumatic  SCLERA: Anicteric  EXTREMITIES: Warm and well perfused  ABDOMEN:  Soft, appropriately tender, non-distended. No guarding, rigidity, or peritoneal signs.   INCISION:  C/d/i, exofin glue on incisions, no erythema or sign of infection    LABS:  Lab Results   Component Value Date    WBC 8.4 07/25/2019     Lab Results   Component Value Date    HGB 11.6 07/25/2019     Lab Results   Component Value Date    HCT 34.0 07/25/2019     Lab Results   Component Value Date     07/25/2019     Last Basic Metabolic Panel:  Lab Results   Component Value Date     07/25/2019      Lab Results   Component Value Date    POTASSIUM 3.8 07/25/2019     Lab Results   Component Value Date    CHLORIDE 108 07/25/2019     Lab Results   Component Value Date    HARDY 8.2 07/25/2019     Lab Results   Component Value Date    CO2 26 07/25/2019     Lab Results   Component Value Date    BUN 10 07/25/2019     Lab Results   Component Value Date    CR 0.98 07/25/2019     Lab Results   Component Value Date     07/25/2019       ASSESSMENT/PLAN: POD#1 s/p robotic assisted sigmoid colectomy, small bowel resection and  mobilization of splenic flexure. AVSS, labs WNL, tolerating full liquid diet, no flatus or BM yet.     1. Continue full liquid diet until robf  2. PRN dilaudid, scheduled tylenol. Added PRN Toradol as well  3. Decrease IVF 50ml/hr  4. Discontinue Velazquez  5. OOB, ambulate QID  6. Lovenox for ppx  7. AROBF    This patient has been discussed with Dr Grimes.       For questions/paging, please contact the CRS office at [phone].    Richelle Cruz PA-C  [facility]  Phone: [phone]    CRS Staff.  Seen and examined independently.  Agree with above.    Reviewed operative findings.  Abdomen soft, wounds look good  Full liquid diet today.    Add toradol for pain today.    Ambulate  Velazquez out  Decrease IV fluids    I performed a history and physical examination of the patient and discussed their management with the physician assistant. I reviewed the physician assistants note and agree with the documented findings and plan of care.     Neel Grimes MD  [facility]  [phone] (office)  [phone] (pager)  [email or web address]

## 2019-07-26 LAB
COPATH REPORT: NORMAL
GLUCOSE BLDC GLUCOMTR-MCNC: 96 MG/DL (ref 70–99)

## 2019-07-26 PROCEDURE — 00000146 ZZHCL STATISTIC GLUCOSE BY METER IP

## 2019-07-26 PROCEDURE — 25000128 H RX IP 250 OP 636: Performed by: COLON & RECTAL SURGERY

## 2019-07-26 PROCEDURE — 25000132 ZZH RX MED GY IP 250 OP 250 PS 637: Mod: GY | Performed by: COLON & RECTAL SURGERY

## 2019-07-26 PROCEDURE — 25000128 H RX IP 250 OP 636: Performed by: PHYSICIAN ASSISTANT

## 2019-07-26 PROCEDURE — 12000000 ZZH R&B MED SURG/OB

## 2019-07-26 RX ORDER — OXYCODONE HYDROCHLORIDE 5 MG/1
5-10 TABLET ORAL EVERY 4 HOURS PRN
Qty: 15 TABLET | Refills: 0 | Status: SHIPPED | OUTPATIENT
Start: 2019-07-26 | End: 2019-08-29

## 2019-07-26 RX ADMIN — OXYCODONE HYDROCHLORIDE 10 MG: 5 TABLET ORAL at 01:42

## 2019-07-26 RX ADMIN — ENOXAPARIN SODIUM 40 MG: 40 INJECTION SUBCUTANEOUS at 11:30

## 2019-07-26 RX ADMIN — ALVIMOPAN 12 MG: 12 CAPSULE ORAL at 08:59

## 2019-07-26 RX ADMIN — KETOROLAC TROMETHAMINE 15 MG: 15 INJECTION, SOLUTION INTRAMUSCULAR; INTRAVENOUS at 09:11

## 2019-07-26 RX ADMIN — OXYCODONE HYDROCHLORIDE 10 MG: 5 TABLET ORAL at 06:02

## 2019-07-26 RX ADMIN — ACETAMINOPHEN 975 MG: 325 TABLET, FILM COATED ORAL at 06:02

## 2019-07-26 RX ADMIN — ACETAMINOPHEN 975 MG: 325 TABLET, FILM COATED ORAL at 14:16

## 2019-07-26 RX ADMIN — ACETAMINOPHEN 975 MG: 325 TABLET, FILM COATED ORAL at 21:27

## 2019-07-26 RX ADMIN — ALVIMOPAN 12 MG: 12 CAPSULE ORAL at 21:27

## 2019-07-26 RX ADMIN — OXYCODONE HYDROCHLORIDE 10 MG: 5 TABLET ORAL at 21:37

## 2019-07-26 RX ADMIN — LOSARTAN POTASSIUM 100 MG: 100 TABLET, FILM COATED ORAL at 08:59

## 2019-07-26 ASSESSMENT — ACTIVITIES OF DAILY LIVING (ADL)
ADLS_ACUITY_SCORE: 15
ADLS_ACUITY_SCORE: 15
ADLS_ACUITY_SCORE: 13
ADLS_ACUITY_SCORE: 15
ADLS_ACUITY_SCORE: 15
ADLS_ACUITY_SCORE: 13

## 2019-07-26 NOTE — PROGRESS NOTES
[facility]  PROGRESS NOTE    July 26, 2019  Post-op Day # 2 s/p robotic assisted sigmoid colectomy, small bowel resection and mobilization of splenic flexure       SUBJECTIVE:  Pt sitting in chair upon arrival. Denies nausea or fevers. Tolerating full liquid diet. Only concern is R-sided pain near port site. Has been up ambulating. +flatus. No BM yet.     OBJECTIVE:  Temp:  [97.3  F (36.3  C)-98.3  F (36.8  C)] 97.3  F (36.3  C)  Pulse:  [65-68] 65  Heart Rate:  [64-74] 64  Resp:  [16] 16  BP: ()/(56-70) 118/70  SpO2:  [83 %-93 %] 93 %    Intake/Output Summary (Last 24 hours) at 7/26/2019 1049  Last data filed at 7/26/2019 0500  Gross per 24 hour   Intake 1075 ml   Output 755 ml   Net 320 ml       GENERAL:  Awake, alert, no acute distress, sitting up in chair  HEAD: Normocephalic atraumatic  SCLERA: Anicteric  EXTREMITIES: Warm and well perfused  ABDOMEN:  Soft, appropriately tender, non-distended. No guarding, rigidity, or peritoneal signs.   INCISION:  C/d/i, no signs of infection    LABS:  Lab Results   Component Value Date    WBC 8.4 07/25/2019     Lab Results   Component Value Date    HGB 11.6 07/25/2019     Lab Results   Component Value Date    HCT 34.0 07/25/2019     Lab Results   Component Value Date     07/25/2019     Last Basic Metabolic Panel:  Lab Results   Component Value Date     07/25/2019      Lab Results   Component Value Date    POTASSIUM 3.8 07/25/2019     Lab Results   Component Value Date    CHLORIDE 108 07/25/2019     Lab Results   Component Value Date    HARDY 8.2 07/25/2019     Lab Results   Component Value Date    CO2 26 07/25/2019     Lab Results   Component Value Date    BUN 10 07/25/2019     Lab Results   Component Value Date    CR 0.98 07/25/2019     Lab Results   Component Value Date     07/25/2019       ASSESSMENT/PLAN: POD#2 s/p robotic assisted sigmoid colectomy, small bowel resection and mobilization of splenic flexure. AVSS, tolerating full  liquid diet, + flatus, no BM yet. Ambulating in halls.     1. Advance to low fiber diet  2. PRN oxycodone and toradol, scheduled tylenol  3. OOB, ambulate QID  4. Lovenox for ppx  5. Discharge pending tolerates diet advancement and further bowel function, possibly over the weekend.    This plan has been discussed with Dr Grimes    For questions/paging, please contact the CR[facility] office at [phone].    Richelle Cruz PA-C  [facility]  Phone: [phone]    CRS Staff.  Seen and examined independently.  Agree with above.    Passed gas yesterday, but non today.  Tolerating diet.  Abdomen soft, but mildly distended.      Will advance diet, but advised to go slow.  My partners will see over the weekend.    I performed a history and physical examination of the patient and discussed their management with the physician assistant. I reviewed the physician assistants note and agree with the documented findings and plan of care.     Neel Grimes MD  [facility]  [phone] (office)  [phone] (pager)  [email or web address]

## 2019-07-26 NOTE — PLAN OF CARE
A/Ox4. VSS ex 02 saturation dipped over night to 84%, placed on 2lpm NC saturations up to 92%. After AM walk, and use of IS, pt now on RA. LS clear. BS hypo, +flatus, -BM. Voiding adequately. Lap sites ecchymotic, cdi/igor. Managing pain with prn oxycodone, toradol, and scheduled tylenol. Tolerating full liquid diet. Ambulating in halls independently.

## 2019-07-26 NOTE — PLAN OF CARE
VSS, A&O, Lung sounds clear. Bowel sounds hypoactive, passing gas, abdomen is slightly distended but soft. Gas discomfort. Lap sites and midline incision EVIE- ecchymotic. Adequate urine output. Ambulates the hallway independently. Given Low fiber diet hand out. Showered. Taking Toradol and scheduled Tylenol for pain control. Pain was 5-6/10- improved.

## 2019-07-26 NOTE — PLAN OF CARE
A&O.  VSS. Bowel sounds hypoactive, passed large amount of gas this evening along with a scant amount of loose green stool. Low fiber diet, ate 50% of dinner.  Walked independently in halls. Rates abdomen discomfort 6/10, declined PRNs.

## 2019-07-27 LAB
CREAT SERPL-MCNC: 1.01 MG/DL (ref 0.66–1.25)
GFR SERPL CREATININE-BSD FRML MDRD: 77 ML/MIN/{1.73_M2}
PLATELET # BLD AUTO: 212 10E9/L (ref 150–450)

## 2019-07-27 PROCEDURE — 36415 COLL VENOUS BLD VENIPUNCTURE: CPT | Performed by: COLON & RECTAL SURGERY

## 2019-07-27 PROCEDURE — 25000128 H RX IP 250 OP 636: Performed by: COLON & RECTAL SURGERY

## 2019-07-27 PROCEDURE — 25000132 ZZH RX MED GY IP 250 OP 250 PS 637: Mod: GY | Performed by: COLON & RECTAL SURGERY

## 2019-07-27 PROCEDURE — 85049 AUTOMATED PLATELET COUNT: CPT | Performed by: COLON & RECTAL SURGERY

## 2019-07-27 PROCEDURE — 82565 ASSAY OF CREATININE: CPT | Performed by: COLON & RECTAL SURGERY

## 2019-07-27 PROCEDURE — 25000128 H RX IP 250 OP 636: Performed by: PHYSICIAN ASSISTANT

## 2019-07-27 PROCEDURE — 12000000 ZZH R&B MED SURG/OB

## 2019-07-27 RX ORDER — HYDROMORPHONE HYDROCHLORIDE 1 MG/ML
0.2 INJECTION, SOLUTION INTRAMUSCULAR; INTRAVENOUS; SUBCUTANEOUS
Status: DISCONTINUED | OUTPATIENT
Start: 2019-07-27 | End: 2019-07-28 | Stop reason: HOSPADM

## 2019-07-27 RX ADMIN — KETOROLAC TROMETHAMINE 15 MG: 15 INJECTION, SOLUTION INTRAMUSCULAR; INTRAVENOUS at 18:27

## 2019-07-27 RX ADMIN — ACETAMINOPHEN 975 MG: 325 TABLET, FILM COATED ORAL at 06:07

## 2019-07-27 RX ADMIN — LOSARTAN POTASSIUM 100 MG: 100 TABLET, FILM COATED ORAL at 08:35

## 2019-07-27 RX ADMIN — OXYCODONE HYDROCHLORIDE 5 MG: 5 TABLET ORAL at 19:55

## 2019-07-27 RX ADMIN — ENOXAPARIN SODIUM 40 MG: 40 INJECTION SUBCUTANEOUS at 11:54

## 2019-07-27 ASSESSMENT — ACTIVITIES OF DAILY LIVING (ADL)
ADLS_ACUITY_SCORE: 13

## 2019-07-27 NOTE — PROGRESS NOTES
[facility]  PROGRESS NOTE    July 27, 2019  Post-op Day # 3 s/p robotic assisted sigmoid colectomy, small bowel resection and mobilization of splenic flexure       SUBJECTIVE:  Pt sitting in chair upon arrival. Denies nausea or fevers. Tolerating low fiber diet.  + flatus and small BM yesterday but feeling bloated this am.  Not redyto go home yet.     OBJECTIVE:  Temp:  [97.8  F (36.6  C)-99  F (37.2  C)] 98.6  F (37  C)  Pulse:  [70-79] 74  Heart Rate:  [68-77] 68  Resp:  [14-16] 16  BP: (113-136)/(64-79) 136/79  SpO2:  [92 %-98 %] 92 %      Intake/Output Summary (Last 24 hours) at 7/27/2019 0845  Last data filed at 7/27/2019 0600  Gross per 24 hour   Intake 1500 ml   Output 450 ml   Net 1050 ml       GENERAL:  Awake, alert, no acute distress, sitting up in chair  HEAD: Normocephalic atraumatic  SCLERA: Anicteric  EXTREMITIES: Warm and well perfused  ABDOMEN:  Soft, appropriately tender, non-distended. No guarding, rigidity, or peritoneal signs.   INCISION:  C/d/i, no signs of infection    LABS:  No new labs    ASSESSMENT/PLAN: POD#3 s/p robotic assisted sigmoid colectomy, small bowel resection and mobilization of splenic flexure. AVSS, tolerating low fiber diet, + flatus, + BM yet. Ambulating in halls.     1. Continue low fiber diet  2. PRN oxycodone and toradol, scheduled tylenol  3. OOB, ambulate QID  4. Lovenox for ppx  5. Discharge likely tomorrow    Irina Hagen MD  [facility]  Phone: [phone]  Fax: [phone]

## 2019-07-27 NOTE — PROGRESS NOTES
[facility] brief follow up:    Followed up with pt regarding post op R eye pain.  Pt reports vision is intact, no further pain, and no noted erythema, drainage, edema.  Please have pt follow up with outpatient ophthalmology if any reoccurrence of pain, blurred vision, drainage, erythema, or edema.    YAIR Mccollum, CNP  [facility]    No charge.

## 2019-07-27 NOTE — PLAN OF CARE
Pt tolerating diet slowly today ambulates reports passing gas and had small bowel movement.  Pt states he has had gas pains on and off today but no nausea. Abdomen soft but some distention noted.voiding lisseth urine lap sites intact with liquid bandage and noted bruising more to umbilicus site. Refuses to take scheduled tylenol or prn pain meds to present time.

## 2019-07-27 NOTE — PLAN OF CARE
Patient is alert and orientated X 4, up independently. Vital signs stable, pian managed with Oxycodone and tylenol. Lung sounds clear. Bowel sounds hypoactive, passing gas and BM X 1.

## 2019-07-28 VITALS
DIASTOLIC BLOOD PRESSURE: 76 MMHG | OXYGEN SATURATION: 93 % | TEMPERATURE: 98.2 F | RESPIRATION RATE: 16 BRPM | HEART RATE: 77 BPM | SYSTOLIC BLOOD PRESSURE: 124 MMHG

## 2019-07-28 PROCEDURE — 25000132 ZZH RX MED GY IP 250 OP 250 PS 637: Mod: GY | Performed by: COLON & RECTAL SURGERY

## 2019-07-28 PROCEDURE — 25000128 H RX IP 250 OP 636: Performed by: PHYSICIAN ASSISTANT

## 2019-07-28 RX ADMIN — LOSARTAN POTASSIUM 100 MG: 100 TABLET, FILM COATED ORAL at 08:53

## 2019-07-28 RX ADMIN — KETOROLAC TROMETHAMINE 15 MG: 15 INJECTION, SOLUTION INTRAMUSCULAR; INTRAVENOUS at 10:13

## 2019-07-28 ASSESSMENT — ACTIVITIES OF DAILY LIVING (ADL)
ADLS_ACUITY_SCORE: 13

## 2019-07-28 NOTE — PROGRESS NOTES
[facility]  PROGRESS NOTE    July 28, 2019  Post-op Day # 4 s/p robotic assisted sigmoid colectomy, small bowel resection and mobilization of splenic flexure       SUBJECTIVE: doing well.  Abdominal pain improved with passing gas yesterday.  + BM as well.  Tolerating diet.  Pain well controlled. Ready to discharge home today.     OBJECTIVE:  Temp:  [98.2  F (36.8  C)-99.1  F (37.3  C)] 98.2  F (36.8  C)  Pulse:  [74-79] 77  Heart Rate:  [74-79] 77  Resp:  [16] 16  BP: (117-136)/(67-79) 124/76  SpO2:  [92 %-93 %] 93 %      Intake/Output Summary (Last 24 hours) at 7/28/2019 0808  Last data filed at 7/28/2019 0600  Gross per 24 hour   Intake 1090 ml   Output 1000 ml   Net 90 ml       GENERAL:  Awake, alert, no acute distress, sitting up in chair  HEAD: Normocephalic atraumatic  SCLERA: Anicteric  EXTREMITIES: Warm and well perfused  ABDOMEN:  Soft, appropriately tender, non-distended. No guarding, rigidity, or peritoneal signs.   INCISION:  C/d/i, no signs of infection    LABS:  No new labs    ASSESSMENT/PLAN: POD#4 s/p robotic assisted sigmoid colectomy, small bowel resection and mobilization of splenic flexure. AVSS, tolerating low fiber diet, + flatus, + BM yet. Ambulating in halls.     1. Continue low fiber diet  2. PRN oxycodone and toradol, scheduled tylenol  3. OOB, ambulate QID  4. Lovenox for ppx  5. Discharge today    Irina Hagen MD  [facility]  Phone: [phone]  Fax: [phone]

## 2019-07-28 NOTE — DISCHARGE INSTRUCTIONS
Discharge Instructions following Abdominal Surgery  [facility]      Bowel Function  After removal of a portion of the intestines, it is normal for bowel movements to be erratic.  They are often looser and more frequent.  This condition generally resolves within a few weeks or it can be controlled with diet or medication.  Diet  In general, you may return to a well-balanced diet upon discharge.  Your doctor will let you know when to add extra fiber to your diet.  Be sure to drink plenty of fluids.  Incision  You should expect some discomfort in the area of your incision, particularly as you increase your activity.  If you notice an area of increasing redness or new drainage, please call your doctor.  You may shower as desired but refrain from tub baths or swimming until the incisions are fully healed.  Activity  Gradually increase your activity each day.  There are generally no restrictions on walking, climbing stairs, or riding in a car.  You can usually drive a car 7-10 days after your leave the hospital.  Do not drive while taking narcotic pain medications.  Ask your doctor regarding resumption of physical sexual activity; in most cases, you can resume sex after a few weeks.  Your physician will advise you about when to return to work.  It is preferable to have somebody stay with you at home until you are fully healed and able to resume a normal level of activity   Medications  You will be discharged with a prescription pain medication and any medications you were taking prior to surgery.  Do not drive while taking narcotic pain medications.  If you need additional medications or a refill, you must call your doctor during normal business hours.  It is the policy of [facility] to not refill pain medication after hours or on weekends because your chart is not available.  Follow-up  Typically, you will be asked to schedule a follow-up office visit 1  to 4 weeks after surgery.  If you have any questions related to follow-up, medications, or your condition, please call the office.      Call your surgeon if you have these signs or symptoms:  ([phone])    Problem with the incision, including increasing pain, swelling, redness, or drainage    Increasing abdominal pain    Uncontrolled nausea or vomiting    Fever or chills    Constipation  (no bowel movement for 3 days)    Diarrhea (more than 3 watery stools within 24 hours)    Bleeding from the rectum, wound, or stoma    Any questions or concerns    Monitor drainage from umbilicus incision and call the Doctor if increase in drainage, foul smelling or pus looking drainage or abdomen more painful.

## 2019-07-28 NOTE — PROGRESS NOTES
Doctor Hieu  Came to assess patient and states patient can still be discharged instructed to send patient home with dressings to keep on umbilicus

## 2019-07-28 NOTE — PROGRESS NOTES
Pt put light on and reports he had drainage from his belly button.  Assessed patient and large amount of serous drainage noted to gown and shorts. Abdomen is soft tender to touch  around umbilicus noted bruising remains unchanged

## 2019-07-28 NOTE — PLAN OF CARE
Dx: Chronic diverticulitis with abcess. Surg: Robot assisted sigmoid colectomy + small bowel ressection. VSS on RA. A/Ox4. Incision has mild bruising present. CMS intact. Pt denies pain this shift. Up independently. Tolerating low fiber diet. Denies N&V. +gas, +bm, BS hypoactive. Voiding adequately. LS clear bilaterally. Will continue to monitor.

## 2019-07-28 NOTE — PROGRESS NOTES
Pt 's son here and nursing assistant transporting patient to door for discharge via w/c no complaints.

## 2019-07-28 NOTE — DISCHARGE SUMMARY
[facility] Discharge Summary      Juancarlos Ma MRN# [id number]   Age: 64 year old YOB: 1955     Date of Admission:  7/24/2019  Date of Discharge::  7/28/2019  Admitting Physician:  Neel Grimes MD  Discharge Physician:  Neel Grimes MD     PCP:  Wili Arevalo    Disposition: Patient discharged from [facility] to home in good condition.        Primary Diagnosis:   Recurrent diverticulitis with abscess            Discharge Medications:   Current Discharge Medication List      START taking these medications    Details   oxyCODONE (ROXICODONE) 5 MG tablet Take 1-2 tablets (5-10 mg) by mouth every 4 hours as needed for moderate to severe pain  Qty: 15 tablet, Refills: 0    Associated Diagnoses: Diverticulitis         CONTINUE these medications which have NOT CHANGED    Details   aspirin 81 MG EC tablet Take 81 mg by mouth daily      hydrochlorothiazide (HYDRODIURIL) 25 MG tablet Take 1 tablet (25 mg) by mouth every morning  Qty: 90 tablet, Refills: 3    Comments: Note change from Losartan HCT  Associated Diagnoses: Essential hypertension, benign      losartan (COZAAR) 100 MG tablet Take 1 tablet (100 mg) by mouth every morning  Qty: 90 tablet, Refills: 3    Comments: Note change from Losartan HCT  Associated Diagnoses: Essential hypertension, benign                    Follow Up, Special Instructions:   Discharge diet: Low residue   Discharge activity: No heavy lifting, pushing, pulling for 6 week(s)   Discharge follow-up: With Dr. Grimes as previously scheduled   Wound care: Keep wound clean and dry              Procedures:   Procedure(s): s/p robotic assisted sigmoid colectomy, small bowel resection and mobilization of splenic flexure       No procedures performed during this admission            Consultations:   None          Brief Hospital Summary:   Patient is a 64 year old male.  He underwent s/p robotic assisted sigmoid colectomy, small bowel resection and  mobilization of splenic flexure on 7/24/19 by Dr. Neel Grimes.   There were no immediate complications during this procedure.    Please refer to the full operative summary for details.  The patient's hospital course was unremarkable.  he recovered as anticipated and experienced no post-operative complications. On the day of discharge he was tolerating a low fiber diet without nausea, vomiting, had return of bowel function and his pain was well controlled on oral medications.  He was deemed safe to discharge to home.        Attestation:  I have reviewed today's vital signs, notes, medications, labs and imaging.    Time spent:  30  min. >50% of this time was spent in reviewing postoperative care instructions, discharge medications and followup instructions.    rIina Hagen MD  [facility]  Phone: [phone]  Fax: [phone]            ADDENDUM:  Length of stay: 5 days  Indicate Y or N for the following:  UTI  No  C diff  No  PNA  No  SSI No  DVT No  PE  No  CVA No  MI No  Enterocutaneous fistula  No  Peripheral nerve injury  No  Abscess (not adjacent to anastomosis)  No  Leak No    Treated with:   Antibiotics N/A   Drain  N/A   Reoperation  N/A  Death within 30 days No  Reintubation  No  Reoperation  No   Procedure

## 2019-07-28 NOTE — PLAN OF CARE
VSS. A&O x4. C/o gas pains, declined pain medication. BS active, passing flatus. States gas pain is improving. Lap sites CDI, bruised. Up independently. Likely discharge to home today.

## 2019-07-28 NOTE — PLAN OF CARE
Pt given discharge instructions and verbalizes understanding of these. Rx filled for oxycodone and given to patient with instructions. No further drainage noted to umbilicus dressing to present time. Pt reports no pain since toradol given earlier. Tolerates diet and activity awaiting son to drive him home at discharge today

## 2019-07-31 ENCOUNTER — TELEPHONE (OUTPATIENT)
Dept: INTERNAL MEDICINE | Facility: CLINIC | Age: 64
End: 2019-07-31

## 2019-07-31 NOTE — TELEPHONE ENCOUNTER
"Hospital/TCU/ED for chronic condition Discharge Protocol    \"Hi, my name is Becka Wright, a registered nurse, and I am calling from [facility].  I am calling to follow up and see how things are going for you after your recent emergency visit/hospital/TCU stay.\"    Tell me how you are doing now that you are home?\" fine      Discharge Instructions    \"Let's review your discharge instructions.  What is/are the follow-up recommendations?  Pt. Response: f/u with surgeon    \"Has an appointment with your primary care provider been scheduled?\"   does not feel needs appt with pcp at this time    \"When you see the provider, I would recommend that you bring your medications with you.\"    Medications    \"Tell me what changed about your medicines when you discharged?\"    Changes to chronic meds?    0-1    \"What questions do you have about your medications?\"    None     New diagnoses of heart failure, COPD, diabetes, or MI?    No              Medication reconciliation completed? Yes  Post Discharge Medication Reconciliation Status: discharge medications reconciled, continue medications without change.      Was MTM referral placed (*Make sure to put transitions as reason for referral)?   No    Call Summary    \"What questions or concerns do you have about your recent visit and your follow-up care?\"     none    \"If you have questions or things don't continue to improve, we encourage you contact us through the main clinic number (give number).  Even if the clinic is not open, triage nurses are available 24/7 to help you.     We would like you to know that our clinic has extended hours (provide information).  We also have urgent care (provide details on closest location and hours/contact info)\"      \"Thank you for your time and take care!\"             "

## 2019-08-12 NOTE — ADDENDUM NOTE
Addendum  created 08/12/19 0838 by Jose Bowers MD    Attestation recorded in Intraprocedure, Intraprocedure Attestations filed

## 2019-08-26 ENCOUNTER — TRANSFERRED RECORDS (OUTPATIENT)
Dept: HEALTH INFORMATION MANAGEMENT | Facility: CLINIC | Age: 64
End: 2019-08-26

## 2019-08-29 ENCOUNTER — OFFICE VISIT (OUTPATIENT)
Dept: INTERNAL MEDICINE | Facility: CLINIC | Age: 64
End: 2019-08-29
Payer: MEDICARE

## 2019-08-29 VITALS
RESPIRATION RATE: 16 BRPM | SYSTOLIC BLOOD PRESSURE: 115 MMHG | TEMPERATURE: 98.1 F | DIASTOLIC BLOOD PRESSURE: 60 MMHG | OXYGEN SATURATION: 96 % | BODY MASS INDEX: 26.97 KG/M2 | WEIGHT: 167.1 LBS | HEART RATE: 64 BPM

## 2019-08-29 DIAGNOSIS — H60.92 OTITIS EXTERNA OF LEFT EAR, UNSPECIFIED CHRONICITY, UNSPECIFIED TYPE: ICD-10-CM

## 2019-08-29 DIAGNOSIS — H61.23 BILATERAL IMPACTED CERUMEN: Primary | ICD-10-CM

## 2019-08-29 PROCEDURE — 99213 OFFICE O/P EST LOW 20 MIN: CPT | Performed by: INTERNAL MEDICINE

## 2019-08-29 RX ORDER — NEOMYCIN SULFATE, POLYMYXIN B SULFATE, HYDROCORTISONE 3.5; 10000; 1 MG/ML; [USP'U]/ML; MG/ML
3 SOLUTION/ DROPS AURICULAR (OTIC) 4 TIMES DAILY
Qty: 10 ML | Refills: 0 | Status: SHIPPED | OUTPATIENT
Start: 2019-08-29 | End: 2019-10-01

## 2019-08-29 NOTE — PROGRESS NOTES
Subjective     Juancarlos Ma is a 64 year old male who presents to clinic today for the following health issues:    HPI   Ear Problem      Duration: on and off for about 2 months    Description (location/character/radiation): Left ear but, check out right as well    Intensity:  No pain    Accompanying signs and symptoms: pressure    History (similar episodes/previous evaluation): None    Precipitating or alleviating factors: None    Therapies tried and outcome: Peroxide with little relief               Reviewed and updated as needed this visit by Provider  Tobacco  Allergies  Meds  Problems  Med Hx  Surg Hx  Fam Hx         Review of Systems   ROS COMP: Constitutional, HEENT, cardiovascular, pulmonary, GI, , musculoskeletal, neuro, skin, endocrine and psych systems are negative, except as otherwise noted.      Objective    /60 (BP Location: Left arm, Patient Position: Chair, Cuff Size: Adult Regular)   Pulse 64   Temp 98.1  F (36.7  C) (Oral)   Resp 16   Wt 75.8 kg (167 lb 1.6 oz)   SpO2 96%   BMI 26.97 kg/m    Body mass index is 26.97 kg/m .  Physical Exam   GENERAL: healthy, alert and no distress  HENT: Cerumen impaction bilaterally.  After successful evacuation, left external ear canal is swollen with inflammation present.  TMs are normal bilaterally.  NECK: no adenopathy, no asymmetry, masses, or scars and thyroid normal to palpation  RESP: lungs clear to auscultation - no rales, rhonchi or wheezes  CV: regular rate and rhythm, normal S1 S2, no S3 or S4, no murmur, click or rub, no peripheral edema and peripheral pulses strong  ABDOMEN: soft, nontender, no hepatosplenomegaly, no masses and bowel sounds normal  MS: no gross musculoskeletal defects noted, no edema            Assessment & Plan     1. Bilateral impacted cerumen  Removed successfully with loop curette and tap water lavage.  Some residual external otitis in the left external canal, treated as below.    2. Otitis externa of left  "ear, unspecified chronicity, unspecified type    - neomycin-polymyxin-hydrocortisone (CORTISPORIN) 3.5-76830-0 otic solution; Place 3 drops Into the left ear 4 times daily  Dispense: 10 mL; Refill: 0     BMI:   Estimated body mass index is 26.97 kg/m  as calculated from the following:    Height as of 6/15/19: 1.676 m (5' 6\").    Weight as of this encounter: 75.8 kg (167 lb 1.6 oz).           See Patient Instructions    Return in about 2 weeks (around 9/12/2019) for recheck, if symptoms fail to improve.    Jass Conley MD  [facility]        "

## 2019-10-16 ENCOUNTER — APPOINTMENT (OUTPATIENT)
Age: 64
Setting detail: DERMATOLOGY
End: 2019-10-17

## 2019-10-16 VITALS — WEIGHT: 170 LBS | HEIGHT: 66 IN | RESPIRATION RATE: 14 BRPM

## 2019-10-16 DIAGNOSIS — L82.1 OTHER SEBORRHEIC KERATOSIS: ICD-10-CM

## 2019-10-16 DIAGNOSIS — L82.0 INFLAMED SEBORRHEIC KERATOSIS: ICD-10-CM

## 2019-10-16 DIAGNOSIS — L57.0 ACTINIC KERATOSIS: ICD-10-CM

## 2019-10-16 DIAGNOSIS — B07.8 OTHER VIRAL WARTS: ICD-10-CM

## 2019-10-16 PROCEDURE — 17000 DESTRUCT PREMALG LESION: CPT | Mod: 59

## 2019-10-16 PROCEDURE — OTHER LIQUID NITROGEN: OTHER

## 2019-10-16 PROCEDURE — OTHER COUNSELING: OTHER

## 2019-10-16 PROCEDURE — 99214 OFFICE O/P EST MOD 30 MIN: CPT | Mod: 25

## 2019-10-16 PROCEDURE — 17110 DESTRUCT B9 LESION 1-14: CPT

## 2019-10-16 ASSESSMENT — LOCATION DETAILED DESCRIPTION DERM
LOCATION DETAILED: RIGHT PROXIMAL ULNAR THUMB
LOCATION DETAILED: SUPERIOR THORACIC SPINE
LOCATION DETAILED: RIGHT SUPERIOR PARIETAL SCALP
LOCATION DETAILED: LEFT RADIAL PALM
LOCATION DETAILED: RIGHT SUPERIOR HELIX
LOCATION DETAILED: STERNUM

## 2019-10-16 ASSESSMENT — LOCATION SIMPLE DESCRIPTION DERM
LOCATION SIMPLE: RIGHT EAR
LOCATION SIMPLE: CHEST
LOCATION SIMPLE: RIGHT THUMB
LOCATION SIMPLE: LEFT HAND
LOCATION SIMPLE: UPPER BACK
LOCATION SIMPLE: SCALP

## 2019-10-16 ASSESSMENT — LOCATION ZONE DERM
LOCATION ZONE: TRUNK
LOCATION ZONE: HAND
LOCATION ZONE: FINGER
LOCATION ZONE: EAR
LOCATION ZONE: SCALP

## 2019-10-16 NOTE — PROCEDURE: COUNSELING
Detail Level: Detailed
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 I gave him a brochure of SKs for him to give to her.

## 2019-10-16 NOTE — PROCEDURE: LIQUID NITROGEN
Render Post-Care Instructions In Note?: yes
Duration Of Freeze Thaw-Cycle (Seconds): 5-10
Consent: The patient's consent was obtained including but not limited to risks of crusting, scabbing, blistering, scarring, darker or lighter pigmentary change, recurrence, incomplete removal and infection.
Include Z78.9 (Other Specified Conditions Influencing Health Status) As An Associated Diagnosis?: No
Post-Care Instructions: I reviewed with the patient in detail post-care instructions. Patient is to avoid picking at any of the treated lesions. Pt may apply Vaseline to crusted or scabbing areas. Pt may apply a bandaid to the treated lesions. Post care instructions given.
Medical Necessity Information: It is in your best interest to select a reason for this procedure from the list below. All of these items fulfill various CMS LCD requirements except the new and changing color options.
Detail Level: Detailed
Number Of Freeze-Thaw Cycles: 5 freeze-thaw cycles
Number Of Freeze-Thaw Cycles: 2 freeze-thaw cycles
Post-Care Instructions: I reviewed with the patient in detail post-care instructions. Patient is to wear sunprotection, and avoid picking at any of the treated lesions. Pt may apply Vaseline to crusted or scabbing areas.
Medical Necessity Clause: This procedure was medically necessary because the lesions that were treated were: contagious, painful and irritated.
Duration Of Freeze Thaw-Cycle (Seconds): 3

## 2019-10-16 NOTE — HPI: SKIN LESION (ACTINIC KERATOSES)
How Severe Are They?: moderate
Is This A New Presentation, Or A Follow-Up?: Follow Up Actinic Keratoses
Additional History: He was supposed to come back last year for a recheck but he is here today.

## 2019-12-31 DIAGNOSIS — I10 ESSENTIAL HYPERTENSION, BENIGN: ICD-10-CM

## 2019-12-31 RX ORDER — HYDROCHLOROTHIAZIDE 25 MG/1
25 TABLET ORAL EVERY MORNING
Qty: 90 TABLET | Refills: 0 | Status: SHIPPED | OUTPATIENT
Start: 2019-12-31 | End: 2020-05-22

## 2019-12-31 RX ORDER — LOSARTAN POTASSIUM 100 MG/1
100 TABLET ORAL EVERY MORNING
Qty: 90 TABLET | Refills: 0 | Status: SHIPPED | OUTPATIENT
Start: 2019-12-31 | End: 2020-05-22

## 2019-12-31 NOTE — TELEPHONE ENCOUNTER
Prescription approved per [facility].    Ana SELFN, RN, PHN       Applied

## 2019-12-31 NOTE — TELEPHONE ENCOUNTER
Please refill on or after 1/1/20. He is going to [place] for the winter and wants to take the extra with him. He leaves 1/7/20.     30, 60, or 90 days is fine and he will come in for an appt when he gets back in April.    Pt ph. [phone], ok to lv detailed message

## 2019-12-31 NOTE — TELEPHONE ENCOUNTER
"Requested Prescriptions   Pending Prescriptions Disp Refills     hydrochlorothiazide (HYDRODIURIL) 25 MG tablet 90 tablet 3     Sig: Take 1 tablet (25 mg) by mouth every morning  Last Written Prescription Date:  02/12/19  Last Fill Quantity: 90,  # refills: 3   Last office visit: 8/29/2019 with prescribing provider:  08/29/19   Future Office Visit:  0       Diuretics (Including Combos) Protocol Passed - 12/31/2019  1:50 PM        Passed - Blood pressure under 140/90 in past 12 months     BP Readings from Last 3 Encounters:   08/29/19 115/60   07/28/19 124/76   07/17/19 102/60                 Passed - Recent (12 mo) or future (30 days) visit within the authorizing provider's specialty     Patient has had an office visit with the authorizing provider or a provider within the authorizing providers department within the previous 12 mos or has a future within next 30 days. See \"Patient Info\" tab in inbasket, or \"Choose Columns\" in Meds & Orders section of the refill encounter.              Passed - Medication is active on med list        Passed - Patient is age 18 or older        Passed - Normal serum creatinine on file in past 12 months     Recent Labs   Lab Test 07/27/19  0653   CR 1.01              Passed - Normal serum potassium on file in past 12 months     Recent Labs   Lab Test 07/25/19  0703   POTASSIUM 3.8                    Passed - Normal serum sodium on file in past 12 months     Recent Labs   Lab Test 07/25/19  0703                 losartan (COZAAR) 100 MG tablet 90 tablet 3     Sig: Take 1 tablet (100 mg) by mouth every morning  Last Written Prescription Date:  02/12/19  Last Fill Quantity: 90,  # refills: 3   Last office visit: 8/29/2019 with prescribing provider:  089/29/19   Future Office Visit:  0       Angiotensin-II Receptors Passed - 12/31/2019  1:50 PM        Passed - Last blood pressure under 140/90 in past 12 months     BP Readings from Last 3 Encounters:   08/29/19 115/60   07/28/19 124/76 " "  07/17/19 102/60                 Passed - Recent (12 mo) or future (30 days) visit within the authorizing provider's specialty     Patient has had an office visit with the authorizing provider or a provider within the authorizing providers department within the previous 12 mos or has a future within next 30 days. See \"Patient Info\" tab in inbasket, or \"Choose Columns\" in Meds & Orders section of the refill encounter.              Passed - Medication is active on med list        Passed - Patient is age 18 or older        Passed - Normal serum creatinine on file in past 12 months     Recent Labs   Lab Test 07/27/19  0653   CR 1.01             Passed - Normal serum potassium on file in past 12 months     Recent Labs   Lab Test 07/25/19  0703   POTASSIUM 3.8                    "

## 2020-05-22 ENCOUNTER — VIRTUAL VISIT (OUTPATIENT)
Dept: INTERNAL MEDICINE | Facility: CLINIC | Age: 65
End: 2020-05-22
Payer: MEDICARE

## 2020-05-22 VITALS — BODY MASS INDEX: 27.64 KG/M2 | HEIGHT: 66 IN | WEIGHT: 172 LBS

## 2020-05-22 DIAGNOSIS — E78.5 HYPERLIPIDEMIA LDL GOAL <130: ICD-10-CM

## 2020-05-22 DIAGNOSIS — Z13.6 CARDIOVASCULAR SCREENING; LDL GOAL LESS THAN 160: ICD-10-CM

## 2020-05-22 DIAGNOSIS — I10 ESSENTIAL HYPERTENSION, BENIGN: Primary | ICD-10-CM

## 2020-05-22 DIAGNOSIS — Z90.49 S/P PARTIAL COLECTOMY: ICD-10-CM

## 2020-05-22 DIAGNOSIS — C61 PROSTATE CANCER (H): ICD-10-CM

## 2020-05-22 DIAGNOSIS — Z12.5 SCREENING FOR PROSTATE CANCER: ICD-10-CM

## 2020-05-22 PROBLEM — K56.609 BOWEL OBSTRUCTION (H): Status: RESOLVED | Noted: 2018-12-16 | Resolved: 2020-05-22

## 2020-05-22 PROCEDURE — 99442: CPT | Performed by: INTERNAL MEDICINE

## 2020-05-22 RX ORDER — HYDROCHLOROTHIAZIDE 25 MG/1
25 TABLET ORAL EVERY MORNING
Qty: 90 TABLET | Refills: 1 | Status: SHIPPED | OUTPATIENT
Start: 2020-05-22 | End: 2020-11-20 | Stop reason: DRUGHIGH

## 2020-05-22 RX ORDER — LOSARTAN POTASSIUM 100 MG/1
100 TABLET ORAL EVERY MORNING
Qty: 90 TABLET | Refills: 1 | Status: SHIPPED | OUTPATIENT
Start: 2020-05-22 | End: 2020-11-20 | Stop reason: DRUGHIGH

## 2020-05-22 ASSESSMENT — MIFFLIN-ST. JEOR: SCORE: 1502.94

## 2020-05-22 NOTE — PATIENT INSTRUCTIONS
"*  Continue all medications at the same doses.  Contact your usual pharmacy if you need refills.     *   Colonoscopy  next due 2021.  You need colonoscopies every 5 years until the age of 80 the presence of precancerous polyps that have been removed  due to your family history of colon cancer and the presence of precancerous polyps that have been removed from you before.     *  You should have a special vaccine to help protect against bacterial streptococcal pneumonia, get this from your pharmacy or else we can give to the next time you are in the clinic.    *  Return to see me in approximately early November 2020 for Annual Medicare wellness exam, and follow up on your other issues, return sooner if needed.  Please get nonfasting labs done in the [facility] or at any other [facility] lab 1-2 days before this appointment.  If you get the labs done at another clinic, make arrangements with that clinic directly.  The orders will be in place.  Use [employer] or Call [phone] to schedule the appointment with me and lab appointment.       SHINGLES VACCINE:        I would recommend that you consider getting a \"shingles vaccine\".  The shingles vaccine does not stop you from getting shingles, but it decreases the intensity of the event, the duration of the event, and decreases the painful nerve condition that results     There are two options available for shingles vaccines:     --Shingrix: 2 shots, give 2-6 months apart  **recommended**   OR   --Zostavax, one shot       Based on the available data, the Shingrix vaccine has superior benefit and should be considered even if you have had the Zostavax vaccine before.         Contact your insurance provider and ask them if either shingles vaccine is covered and is so, how much it will cost you.  Usually this will be cheaper to get in a pharmacy given by the pharmacist.       Regardless of the coverage, I would recommend that you consider the vaccine since shingles is " "very painful, (just ask anyone who has ever had it).        5 GOALS TO PREVENT VASCULAR DISEASE:     1.  Aggressive blood pressure control, under 130/80 ideally.  Using medications if needed.    Your blood pressure is under good control    BP Readings from Last 4 Encounters:   08/29/19 115/60   07/28/19 124/76   07/17/19 102/60   06/18/19 131/64       2.  Aggressive LDL cholesterol (\"bad cholesterol\") lowering as indicated.    Your goal is an LDL under 130 for sure, preferably under 100.  (If you have diabetes or previous vascular disease, the the LDL goals would be under 100 for sure, preferably under 70.)    New guidelines identify four high-risk groups who could benefit from statins:   *people with pre-existing heart disease, such as those who have had a heart attack;   *people ages 40 to 75 who have diabetes of any type  *patients ages 40 to 75 with at least a 7.5% risk of developing cardiovascular disease over the next decade, according to a formula described in the guidelines  *patients with the sort of super-high cholesterol that sometimes runs in families, as evidenced by an LDL of 190 milligrams per deciliter or higher    Your cholesterol levels are well controlled.    Recent Labs   Lab Test 02/26/18  0811 02/24/17  0919 02/23/15  0831 01/22/13  1035   CHOL 205* 192 184 208*   HDL 69 63 66 78   * 111* 105 116   TRIG 74 92 65 70   CHOLHDLRATIO  --   --  2.8 2.7       3.  Aggressive diabetic prevention, screening and/or management.      You do not have diabetes as of the most recent blood tests.     4.  No smoking    5.  Consider Daily aspirin: Have a discussion about the relative benefits and risks of taking daily low dose aspirin (81 mg) tablet once per day over the age of 50, unless there is a specific reason that you cannot take aspirin (such as side effect, allergy, or you are on another \"blood thinner\").        --We can discuss whether or not you need to take a daily preventative aspirin tablet " at your next appointment.

## 2020-05-22 NOTE — PROGRESS NOTES
"Juancarlos Ma is a 65 year old male who is being evaluated via a billable telephone visit.      The patient has been notified of following:     \"This telephone visit will be conducted via a call between you and your physician/provider. We have found that certain health care needs can be provided without the need for a physical exam.  This service lets us provide the care you need with a short phone conversation.  If a prescription is necessary we can send it directly to your pharmacy.  If lab work is needed we can place an order for that and you can then stop by our lab to have the test done at a later time.    Telephone visits are billed at different rates depending on your insurance coverage. During this emergency period, for some insurers they may be billed the same as an in-person visit.  Please reach out to your insurance provider with any questions.    If during the course of the call the physician/provider feels a telephone visit is not appropriate, you will not be charged for this service.\"    Patient has given verbal consent for Telephone visit?  Yes    What phone number would you like to be contacted at? [phone]    How would you like to obtain your AVS? Mail a copy    Subjective     Juancarlos Ma is a 65 year old male who presents via phone visit today for the following health issues:    HPI  Hyperlipidemia Follow-Up      Are you regularly taking any medication or supplement to lower your cholesterol?   No    Are you having muscle aches or other side effects that you think could be caused by your cholesterol lowering medication?  No    Hypertension Follow-up      Do you check your blood pressure regularly outside of the clinic? No     Are you following a low salt diet? No    Are your blood pressures ever more than 140 on the top number (systolic) OR more   than 90 on the bottom number (diastolic), for example 140/90? No      How many servings of fruits and vegetables do you eat daily?  0-1    On " "average, how many sweetened beverages do you drink each day (Examples: soda, juice, sweet tea, etc.  Do NOT count diet or artificially sweetened beverages)?   0    How many days per week do you exercise enough to make your heart beat faster? 7    How many minutes a day do you exercise enough to make your heart beat faster? 30 - 60    How many days per week do you miss taking your medication? 0    3.  History of prostate cancer, status post prostatectomy 2013.  PSA levels have been stable since that time.  His urologist told the patient to have us continue to monitor the PSA levels.    Lab Results   Component Value Date    PSA 0.92 12/28/2018    PSA 0.52 02/26/2018    PSA 0.45 02/24/2017    PSA 0.37 02/23/2016    PSA 0.17 02/23/2015    PSA <0.10 06/26/2014    PSA <0.04 09/26/2013    PSA 19.50 04/25/2011    PSA 19.50 04/25/2011    PSA 10.30 09/29/2008    PSA 10.70 07/30/2007        4.  Status post partial colectomy July 2019 for recurrent episodes of diverticulitis.  He is been doing well since that time all movements are formed, solid, once per day.    I reviewed the information recorded in the patient's EPIC chart (including but not limited to medical history, surgical history, problem list, medication list, and allergy list) and updated information as needed.               Reviewed and updated as needed this visit by Provider         Review of Systems   Constitutional, HEENT, cardiovascular, pulmonary, gi and gu systems are negative, except as otherwise noted.       Objective   Reported vitals:  Ht 1.676 m (5' 6\")   Wt 78 kg (172 lb)   BMI 27.76 kg/m     healthy, alert and no distress  PSYCH: Alert and oriented times 3; coherent speech, normal   rate and volume, able to articulate logical thoughts, able   to abstract reason, no tangential thoughts, no hallucinations   or delusions  His affect is normal  RESP: No cough, no audible wheezing, able to talk in full sentences  Remainder of exam unable to be completed " due to telephone visits    Diagnostic Test Results:  Labs reviewed in Epic        Assessment/Plan:    (I10) Essential hypertension, benign  (primary encounter diagnosis)  Comment: This condition is currently controlled on the current medical regimen.  Continue current therapy.   Discussed current hypertension treatment guidelines, including indications for treatment and treatment options.  Discussed the importance for aggressive management of HTN to prevent vascular complications later.  Recommended lower fat, lower carbohydrate, and lower sodium (<2000 mg)diet.  Discussed required intervals for follow up on HTN, lab studies.  Recommened pt. follow their blood pressures outside the clinic to ensure that BPs are remaining within guidelines, and to contact me if the readings are not within guidelines on a regular basis so we can adjust treatment as needed.   Plan: hydrochlorothiazide (HYDRODIURIL) 25 MG tablet,        losartan (COZAAR) 100 MG tablet, Basic         metabolic panel, CBC with platelets            (C61) Prostate cancer (H)  Comment: Needs repeat PSA.  They have declined very slightly.  We will perform this blood test at his next appointment.  Refer to urology if any significant increases.  Plan:     (E78.5) Hyperlipidemia LDL goal <130  Comment: Discussed current lipid results, previous results (if available) current guidelines (NCEP) for treatment and goals for lipids.  Discussed lifestyle modification, dietary changes (low fat, low simple carb) and regular aerobic exercise.  Discussed the link between dysmetabolic syndrome and impaired glucose tolerance seen in certain patterns of lipids.  Briefly discussed medication used for lipid lowering, including the statins are their possible side effects of myalgias, rhabdomyolysis, and liver toxicity.   Plan: Lipid panel reflex to direct LDL Fasting            (Z12.5) Screening for prostate cancer  Comment:   Plan: Prostate spec antigen screen             (Z13.6) CARDIOVASCULAR SCREENING; LDL GOAL LESS THAN 160  Comment: Discussed cardiac disease risk factors and cardiac disease risk factor modification.   Plan: Basic metabolic panel, CBC with platelets,         Lipid panel reflex to direct LDL Fasting            (Z90.49) S/P sigmoid colectomy  Comment: Doing well since his sigmoid colectomy.  No further episodes of diverticulitis.  Bowel functions maintained normal.  Plan:           Return in about 6 months (around 11/22/2020) for Medicare Annual Wellness Exam, Blood pressure, with pre-visit fasting labs.      Phone call duration:  15:01 minutes    Wili Arevalo MD

## 2020-11-20 ENCOUNTER — OFFICE VISIT (OUTPATIENT)
Dept: INTERNAL MEDICINE | Facility: CLINIC | Age: 65
End: 2020-11-20
Payer: MEDICARE

## 2020-11-20 VITALS
DIASTOLIC BLOOD PRESSURE: 76 MMHG | TEMPERATURE: 97.3 F | SYSTOLIC BLOOD PRESSURE: 132 MMHG | HEART RATE: 65 BPM | OXYGEN SATURATION: 97 % | HEIGHT: 66 IN | BODY MASS INDEX: 30.41 KG/M2 | WEIGHT: 189.2 LBS

## 2020-11-20 DIAGNOSIS — H61.22 LEFT EAR IMPACTED CERUMEN: ICD-10-CM

## 2020-11-20 DIAGNOSIS — I10 ESSENTIAL HYPERTENSION, BENIGN: ICD-10-CM

## 2020-11-20 DIAGNOSIS — Z12.5 SCREENING FOR PROSTATE CANCER: ICD-10-CM

## 2020-11-20 DIAGNOSIS — Z23 NEED FOR PNEUMOCOCCAL VACCINE: ICD-10-CM

## 2020-11-20 DIAGNOSIS — Z00.00 ENCOUNTER FOR MEDICARE ANNUAL WELLNESS EXAM: Primary | ICD-10-CM

## 2020-11-20 DIAGNOSIS — Z13.6 CARDIOVASCULAR SCREENING; LDL GOAL LESS THAN 160: ICD-10-CM

## 2020-11-20 DIAGNOSIS — E78.5 HYPERLIPIDEMIA LDL GOAL <130: ICD-10-CM

## 2020-11-20 LAB
ANION GAP SERPL CALCULATED.3IONS-SCNC: 4 MMOL/L (ref 3–14)
BUN SERPL-MCNC: 21 MG/DL (ref 7–30)
CALCIUM SERPL-MCNC: 9.8 MG/DL (ref 8.5–10.1)
CHLORIDE SERPL-SCNC: 95 MMOL/L (ref 94–109)
CHOLEST SERPL-MCNC: 207 MG/DL
CO2 SERPL-SCNC: 32 MMOL/L (ref 20–32)
CREAT SERPL-MCNC: 1.09 MG/DL (ref 0.66–1.25)
ERYTHROCYTE [DISTWIDTH] IN BLOOD BY AUTOMATED COUNT: 12.3 % (ref 10–15)
GFR SERPL CREATININE-BSD FRML MDRD: 70 ML/MIN/{1.73_M2}
GLUCOSE SERPL-MCNC: 99 MG/DL (ref 70–99)
HCT VFR BLD AUTO: 40.8 % (ref 40–53)
HDLC SERPL-MCNC: 71 MG/DL
HGB BLD-MCNC: 13.9 G/DL (ref 13.3–17.7)
LDLC SERPL CALC-MCNC: 122 MG/DL
MCH RBC QN AUTO: 32.5 PG (ref 26.5–33)
MCHC RBC AUTO-ENTMCNC: 34.1 G/DL (ref 31.5–36.5)
MCV RBC AUTO: 95 FL (ref 78–100)
NONHDLC SERPL-MCNC: 136 MG/DL
PLATELET # BLD AUTO: 222 10E9/L (ref 150–450)
POTASSIUM SERPL-SCNC: 3.7 MMOL/L (ref 3.4–5.3)
PSA SERPL-ACNC: 1.08 UG/L (ref 0–4)
RBC # BLD AUTO: 4.28 10E12/L (ref 4.4–5.9)
SODIUM SERPL-SCNC: 131 MMOL/L (ref 133–144)
TRIGL SERPL-MCNC: 72 MG/DL
WBC # BLD AUTO: 4.6 10E9/L (ref 4–11)

## 2020-11-20 PROCEDURE — 36415 COLL VENOUS BLD VENIPUNCTURE: CPT | Performed by: INTERNAL MEDICINE

## 2020-11-20 PROCEDURE — 80061 LIPID PANEL: CPT | Performed by: INTERNAL MEDICINE

## 2020-11-20 PROCEDURE — G0009 ADMIN PNEUMOCOCCAL VACCINE: HCPCS | Performed by: INTERNAL MEDICINE

## 2020-11-20 PROCEDURE — 69209 REMOVE IMPACTED EAR WAX UNI: CPT | Performed by: INTERNAL MEDICINE

## 2020-11-20 PROCEDURE — 85027 COMPLETE CBC AUTOMATED: CPT | Performed by: INTERNAL MEDICINE

## 2020-11-20 PROCEDURE — 90732 PPSV23 VACC 2 YRS+ SUBQ/IM: CPT | Performed by: INTERNAL MEDICINE

## 2020-11-20 PROCEDURE — 99213 OFFICE O/P EST LOW 20 MIN: CPT | Mod: 25 | Performed by: INTERNAL MEDICINE

## 2020-11-20 PROCEDURE — G0103 PSA SCREENING: HCPCS | Performed by: INTERNAL MEDICINE

## 2020-11-20 PROCEDURE — G0438 PPPS, INITIAL VISIT: HCPCS | Performed by: INTERNAL MEDICINE

## 2020-11-20 PROCEDURE — 80048 BASIC METABOLIC PNL TOTAL CA: CPT | Performed by: INTERNAL MEDICINE

## 2020-11-20 RX ORDER — LOSARTAN POTASSIUM AND HYDROCHLOROTHIAZIDE 25; 100 MG/1; MG/1
1 TABLET ORAL EVERY MORNING
Qty: 90 TABLET | Refills: 3 | Status: SHIPPED | OUTPATIENT
Start: 2020-11-20 | End: 2021-11-18

## 2020-11-20 ASSESSMENT — MIFFLIN-ST. JEOR: SCORE: 1580.96

## 2020-11-20 NOTE — PROGRESS NOTES
Subjective     Juancarlos Ma is a 65 year old male who presents to clinic today for the following health issues:    HPI         Hypertension Follow-up      Do you check your blood pressure regularly outside of the clinic? No     Are you following a low salt diet? No    Are your blood pressures ever more than 140 on the top number (systolic) OR more   than 90 on the bottom number (diastolic), for example 140/90? n/a      How many servings of fruits and vegetables do you eat daily?  0-1    On average, how many sweetened beverages do you drink each day (Examples: soda, juice, sweet tea, etc.  Do NOT count diet or artificially sweetened beverages)?   0    How many days per week do you miss taking your medication? 0    Hypertension:  History of hypertension, on medication.  No reported side effects from medications.    Reviewed last 6 BP readings in chart:  BP Readings from Last 6 Encounters:   11/20/20 132/76   08/29/19 115/60   07/28/19 124/76   07/17/19 102/60   06/18/19 131/64   12/28/18 102/70     No active cardiac complaints or symptoms with exercise.     Annual Wellness Visit    Patient has been advised of split billing requirements and indicates understanding: Yes     Are you in the first 12 months of your Medicare Part B coverage?  No    Physical Health:    In general, how would you rate your overall physical health? good    Outside of work, how many days during the week do you exercise?none    Outside of work, approximately how many minutes a day do you exercise?not applicable  If you drink alcohol do you typically have >3 drinks per day or >7 drinks per week? Yes - AUDIT SCORE:  7  AUDIT - Alcohol Use Disorders Identification Test - Reproduced from the World Health Organization Audit 2001 (Second Edition) 11/20/2020   1.  How often do you have a drink containing alcohol? 4 or more times a week   2.  How many drinks containing alcohol do you have on a typical day when you are drinking? 3 or 4   3.  How often do  "you have five or more drinks on one occasion? Monthly   4.  How often during the last year have you found that you were not able to stop drinking once you had started? Never   5.  How often during the last year have you failed to do what was normally expected of you because of drinking? Never   6.  How often during the last year have you needed a first drink in the morning to get yourself going after a heavy drinking session? Never   7.  How often during the last year have you had a feeling of guilt or remorse after drinking? Never   8.  How often during the last year have you been unable to remember what happened the night before because of your drinking? Never   9.  Have you or someone else been injured because of your drinking? No   10. Has a relative, friend, doctor or other health care worker been concerned about your drinking or suggested you cut down? No   TOTAL SCORE 7       Do you usually eat at least 4 servings of fruit and vegetables a day, include whole grains & fiber and avoid regularly eating high fat or \"junk\" foods? NO    Do you have any problems taking medications regularly? No    Do you have any side effects from medications? none    Needs assistance for the following daily activities: no assistance needed    Which of the following safety concerns are present in your home?  none identified     Hearing impairment: No    In the past 6 months, have you been bothered by leaking of urine? no    Mental Health:    In general, how would you rate your overall mental or emotional health? excellent  PHQ-2 Score:      Do you feel safe in your environment? Yes    Have you ever done Advance Care Planning? (For example, a Health Directive, POLST, or a discussion with a medical provider or your loved ones about your wishes)? Yes, advance care planning is on file.    Fall risk:  Fallen 2 or more times in the past year?: No  Any fall with injury in the past year?: No    Cognitive Screenin) Repeat 3 items " "(Leader, Season, Table)    2) Clock draw: NORMAL  3) 3 item recall: Recalls 3 objects  Results: 3 items recalled: COGNITIVE IMPAIRMENT LESS LIKELY    Mini-CogTM Copyright S Kimberly. Licensed by the author for use in [facility]; reprinted with permission (nick@.[facility]). All rights reserved.      Do you have sleep apnea, excessive snoring or daytime drowsiness?: no    Current providers sharing in care for this patient include:   Patient Care Team:  Wili Arevalo MD as PCP - General  Wili Arevalo MD as Assigned PCP    Patient has been advised of split billing requirements and indicates understanding: Yes    **I reviewed the information recorded in the patient's EPIC chart (including but not limited to medical history, surgical history, family history, problem list, medication list, and allergy list) and updated the information as indicated based on the patients reported information.         Review of Systems   Constitutional, HEENT, cardiovascular, pulmonary, gi and gu systems are negative, except as otherwise noted.      Objective    /76   Pulse 65   Temp 97.3  F (36.3  C) (Temporal)   Ht 1.676 m (5' 6\")   Wt 85.8 kg (189 lb 3.2 oz)   SpO2 97%   BMI 30.54 kg/m    Body mass index is 30.54 kg/m .  Physical Exam   GENERAL alert and no distress  EYES:  Normal sclera,conjunctiva, EOMI  EARS:  Left  ear canal shows wax occluding most of the ear canal, unable to fully see TM.  The wax was removed with water irrigation.   Canal was normal after, TM normal.  Pt tolerated well.   HENT: oral and posterior pharynx without lesions or erythema, facies symmetric  NECK: Neck supple. No LAD, without thyroidmegaly.  RESP: Clear to ausculation bilaterally without wheezes or crackles. Normal BS in all fields.  CV: RRR normal S1S2 without murmurs, rubs or gallops.  LYMPH: no cervical lymph adenopathy appreciated  MS: extremities- no gross deformities of the visible extremities noted,   EXT:  " no lower extremity edema  PSYCH: Alert and oriented times 3; speech- coherent  SKIN:  No obvious significant skin lesions on visible portions of face                 (Z00.00) Encounter for Medicare annual wellness exam  (primary encounter diagnosis)  Comment: Discussed cardiac disease risk factors and cardiac disease risk factor modification, including diabetes screening, blood pressure screening (and management if indicated), and cholesterol screening.   Reviewed immunzation guidelines, including pneumococcal vaccines, annual influenza, and shingles vaccines.   Discussed routine cancer screenings, including skin cancer, colon cancer screening for everyone until age 80, prostate cancer screening in men until age 75, mammogram and PAP/pelvic for women until age 75.   Recommended regular dentist visits to care for remaining teeth.   Recommended regular screening for vision and glaucoma.   Recommended safe driving and accident avoidance.   Plan:     (I10) Essential hypertension, benign  Comment: This condition is currently controlled on the current medical regimen.  Continue current therapy.   Plan: losartan-hydrochlorothiazide (HYZAAR) 100-25 MG        tablet            (Z23) Need for pneumococcal vaccine  Comment:   Plan: Pneumococcal vaccine 23 valent PPSV23          (Pneumovax) [48544], ADMIN MEDICARE:         Pneumococcal Vaccine ()            (E78.5) Hyperlipidemia LDL goal <130  Comment: Discussed current lipid results, previous results (if available) current guidelines (NCEP) for treatment and goals for lipids.  Discussed lifestyle modification, dietary changes (low fat, low simple carb) and regular aerobic exercise.  Discussed the link between dysmetabolic syndrome and impaired glucose tolerance seen in certain patterns of lipids.  Briefly discussed medication used for lipid lowering, including the statins are their possible side effects of myalgias, rhabdomyolysis, and liver toxicity.   Plan:     (Z13.6)  CARDIOVASCULAR SCREENING; LDL GOAL LESS THAN 160  Comment: Discussed cardiac disease risk factors and cardiac disease risk factor modification.   Plan:     (Z12.5) Screening for prostate cancer  Comment: Discussed prostate cancer screening and PSA blood test.  Discussed that an elevated PSA blood test can be caused by things other than prostate cancer, including infection/inflammation, BPH, and recent sexual activity; and that elevated PSA blood test may require further investigation with a urologist   Plan:     (H61.22) Left ear impacted cerumen  Comment: Impacted cerumen was removed with water irrigation.  Canal and TM clear afterwards.  Discussed strategies for preventing future ear wax buildups.   Plan: REMOVE IMPACTED CERUMEN                 The patient reports that he drinks more than one alcoholic drink per day and sometimes engages in binge or excessive drinking. He was counseled and given information about possible harmful effects of excessive alcohol intake as well as where to get help for alcohol problems.

## 2020-11-20 NOTE — PATIENT INSTRUCTIONS
"*  Continue all medications at the same doses.  Contact your usual pharmacy if you need refills.     *  Return to see me in 1 year, sooner if needed.  Please get fasting labs done at the [facility] or any other [facility] lab 1-2 days before this appointment (schedule a \"lab appointment\" for this).  If you get the labs done at another [facility], make arrangements with them directly.  The orders will be in place.  Eat nothing for at least 8 hours prior to having these labs drawn.  Use [facility] or Call [phone] to schedule the appointment with me and lab appointment.        CERUMEN IMPACTION (EXCESSIVE EAR WAX BUILD UP):       *  You had a fair amount of wax in the ear canal.     *  Wax softening ear drops (e.g. Debrox or mineral oil) into the affected ear canal, let it sit for 15 minutes, then irrigate with the syringe as below.     *  In the future, never use Q-tips or swabs to try and clean out your ear canal as you will risk injury to the ear canal or ear drum and you will do nothing more than just push the wax in deeper into the erar canal.     *  If you have future problems with ear wax build up, you may use a \"bulb syringe\" from any drug store and perform your own ear canal washes every 2-3 months if needed.                  5 GOALS TO PREVENT VASCULAR DISEASE:     1.  Aggressive blood pressure control, under 130/80 ideally.  Using medications if needed.    Your blood pressure is under good control    BP Readings from Last 4 Encounters:   11/20/20 132/76   08/29/19 115/60   07/28/19 124/76   07/17/19 102/60       2.  Aggressive LDL cholesterol (\"bad cholesterol\") lowering as indicated.    Your goal is an LDL under 130 for sure, preferably under 100.  (If you have diabetes or previous vascular disease, the the LDL goals would be under 100 for sure, preferably under 70.)    New guidelines identify four high-risk groups who could benefit from statins:   *people with pre-existing heart " disease, such as those who have had a heart attack;   *people ages 40 to 75 who have diabetes of any type  *patients ages 40 to 75 with at least a 7.5% risk of developing cardiovascular disease over the next decade, according to a formula described in the guidelines  *patients with the sort of super-high cholesterol that sometimes runs in families, as evidenced by an LDL of 190 milligrams per deciliter or higher    Your cholesterol levels are well controlled.    Recent Labs   Lab Test 02/26/18  0811 02/24/17  0919 02/23/15  0831 01/22/13  1035   CHOL 205* 192 184 208*   HDL 69 63 66 78   * 111* 105 116   TRIG 74 92 65 70   CHOLHDLRATIO  --   --  2.8 2.7       3.  Aggressive diabetic prevention, screening and/or management.      You do not have diabetes as of the most recent blood tests.     4.  No smoking    5.  Consider daily preventative aspirin over age 50 if you have enough cardiac risk factors to place you at higher risk for the presence of vascular disease.    If you have any reason not to take aspirin such easy bruising or bleeding, stomach problems, other anticoagulant medications, or any other side effects, then you should not take Aspirin.      --Based on your current cardiac risk factor profile, you should take regular daily Aspirin 81 mg once per day.             Preventive Health Recommendations:   Male Ages 65 and over    Yearly exam:             See your health care provider every year in order to  o   Review health changes.   o   Discuss preventive care.    o   Review your medicines if your doctor has prescribed any.    Talk with your health care provider about whether you should have a test to screen for prostate cancer (PSA).    Every 3 years, have a diabetes test (fasting glucose). If you are at risk for diabetes, you should have this test more often.    Every 5 years, have a cholesterol test. Have this test more often if you are at risk for high cholesterol or heart disease.     Every 10  "years, have a colonoscopy. Or, have a yearly FIT test (stool test). These exams will check for colon cancer.    Talk to with your health care provider about screening for Abdominal Aortic Aneurysm if you have a family history of AAA or have a history of smoking.    Shots:     Get a flu shot each year.     Get a tetanus shot every 10 years.     Talk to your doctor about your pneumonia vaccines. There are now two you should receive - Pneumovax (PPSV 23) and Prevnar (PCV 13).     Talk to your doctor about a shingles vaccine.     Talk to your doctor about the hepatitis B vaccine.  Nutrition:     Eat at least 5 servings of fruits and vegetables each day.     Eat whole-grain bread, whole-wheat pasta and brown rice instead of white grains and rice.     Talk to your provider about Calcium and Vitamin D.      --Good Grains:  Oats, brown rice, Quinoa (these do not raise the blood sugar as much)     --Bad grains:  Anything made from wheat or white rice     (because these raise the blood sugars significantly, and the possible gluten issue from wheat for some people).      --Proteins:  Aim for \"lean proteins\" including chicken, fish, seafood, pork, turkey, and eggs (in moderation); Eat red meat only occasionally      Lifestyle    Exercise for at least 150 minutes a week (30 minutes a day, 5 days a week). This will help you control your weight and prevent disease.     Limit alcohol to one drink per day.     No smoking.     Wear sunscreen to prevent skin cancer.     See your dentist every six months for an exam and cleaning.     See your eye doctor every 1 to 2 years to screen for conditions such as glaucoma, macular degeneration, cataracts, etc             Patient Education   Personalized Prevention Plan  You are due for the preventive services outlined below.  Your care team is available to assist you in scheduling these services.  If you have already completed any of these items, please share that information with your care " team to update in your medical record.  Health Maintenance Due   Topic Date Due     Zoster (Shingles) Vaccine (1 of 2) 01/06/2004     Pneumococcal Vaccine (1 of 1 - PPSV23) 01/06/2019     Colorectal Cancer Screening  09/12/2019     FALL RISK ASSESSMENT  07/17/2020     Flu Vaccine (1) 09/01/2020      Patient Education   Alcohol Use     Many people can enjoy a glass of wine or beer without any negative consequences to their health. According to the Centers for Disease Control and Prevention (CDC), having one or fewer drinks per day for women and two or fewer per day for men is considered moderate drinking.     When people drink more than moderately, it can become concerning. Excessive drinking is defined as consuming 15 drinks or more per week for men and 8 drinks or more per week for women. There are various health problems associated with excessive drinking, which include:    Damage to vital organs like the heart, brain, liver and pancreas    Harm to the digestive tract    Weaken the immune system    Higher risk for heart disease and cancer    There are many resources available to people who are addicted to alcohol. A counselor or other health care provider can give you support. So can a , , or rabbi who is trained in substance abuse counseling. Friends and family may also help once you are connected with professionals.

## 2021-02-17 DIAGNOSIS — N52.31 ERECTILE DYSFUNCTION FOLLOWING RADICAL PROSTATECTOMY: ICD-10-CM

## 2021-02-18 RX ORDER — SILDENAFIL 100 MG/1
50-100 TABLET, FILM COATED ORAL DAILY PRN
Qty: 30 TABLET | Refills: 5 | Status: SHIPPED | OUTPATIENT
Start: 2021-02-18 | End: 2021-11-18

## 2021-04-09 ENCOUNTER — TRANSFERRED RECORDS (OUTPATIENT)
Dept: HEALTH INFORMATION MANAGEMENT | Facility: CLINIC | Age: 66
End: 2021-04-09

## 2021-09-16 ENCOUNTER — TRANSFERRED RECORDS (OUTPATIENT)
Dept: HEALTH INFORMATION MANAGEMENT | Facility: CLINIC | Age: 66
End: 2021-09-16

## 2021-11-18 ENCOUNTER — OFFICE VISIT (OUTPATIENT)
Dept: INTERNAL MEDICINE | Facility: CLINIC | Age: 66
End: 2021-11-18
Payer: MEDICARE

## 2021-11-18 VITALS
WEIGHT: 185.2 LBS | HEIGHT: 66 IN | RESPIRATION RATE: 16 BRPM | BODY MASS INDEX: 29.77 KG/M2 | DIASTOLIC BLOOD PRESSURE: 76 MMHG | TEMPERATURE: 98 F | HEART RATE: 66 BPM | OXYGEN SATURATION: 95 % | SYSTOLIC BLOOD PRESSURE: 128 MMHG

## 2021-11-18 DIAGNOSIS — Z00.00 ENCOUNTER FOR MEDICARE ANNUAL WELLNESS EXAM: Primary | ICD-10-CM

## 2021-11-18 DIAGNOSIS — Z12.5 SCREENING FOR PROSTATE CANCER: ICD-10-CM

## 2021-11-18 DIAGNOSIS — N52.31 ERECTILE DYSFUNCTION FOLLOWING RADICAL PROSTATECTOMY: ICD-10-CM

## 2021-11-18 DIAGNOSIS — I10 ESSENTIAL HYPERTENSION, BENIGN: ICD-10-CM

## 2021-11-18 DIAGNOSIS — E78.5 HYPERLIPIDEMIA LDL GOAL <130: ICD-10-CM

## 2021-11-18 DIAGNOSIS — Z90.49 S/P PARTIAL COLECTOMY: ICD-10-CM

## 2021-11-18 DIAGNOSIS — C61 PROSTATE CANCER (H): ICD-10-CM

## 2021-11-18 DIAGNOSIS — Z13.6 CARDIOVASCULAR SCREENING; LDL GOAL LESS THAN 130: ICD-10-CM

## 2021-11-18 LAB
ALBUMIN SERPL-MCNC: 4.1 G/DL (ref 3.4–5)
ALP SERPL-CCNC: 60 U/L (ref 40–150)
ALT SERPL W P-5'-P-CCNC: 28 U/L (ref 0–70)
ANION GAP SERPL CALCULATED.3IONS-SCNC: 5 MMOL/L (ref 3–14)
AST SERPL W P-5'-P-CCNC: 20 U/L (ref 0–45)
BILIRUB SERPL-MCNC: 0.9 MG/DL (ref 0.2–1.3)
BUN SERPL-MCNC: 19 MG/DL (ref 7–30)
CALCIUM SERPL-MCNC: 9.4 MG/DL (ref 8.5–10.1)
CHLORIDE BLD-SCNC: 93 MMOL/L (ref 94–109)
CHOLEST SERPL-MCNC: 191 MG/DL
CO2 SERPL-SCNC: 30 MMOL/L (ref 20–32)
CREAT SERPL-MCNC: 1.07 MG/DL (ref 0.66–1.25)
ERYTHROCYTE [DISTWIDTH] IN BLOOD BY AUTOMATED COUNT: 12.4 % (ref 10–15)
FASTING STATUS PATIENT QL REPORTED: YES
GFR SERPL CREATININE-BSD FRML MDRD: 71 ML/MIN/1.73M2
GLUCOSE BLD-MCNC: 100 MG/DL (ref 70–99)
HCT VFR BLD AUTO: 44.6 % (ref 40–53)
HDLC SERPL-MCNC: 72 MG/DL
HGB BLD-MCNC: 14.9 G/DL (ref 13.3–17.7)
LDLC SERPL CALC-MCNC: 107 MG/DL
MCH RBC QN AUTO: 31.7 PG (ref 26.5–33)
MCHC RBC AUTO-ENTMCNC: 33.4 G/DL (ref 31.5–36.5)
MCV RBC AUTO: 95 FL (ref 78–100)
NONHDLC SERPL-MCNC: 119 MG/DL
PLATELET # BLD AUTO: 246 10E3/UL (ref 150–450)
POTASSIUM BLD-SCNC: 4.2 MMOL/L (ref 3.4–5.3)
PROT SERPL-MCNC: 8.3 G/DL (ref 6.8–8.8)
PSA SERPL-MCNC: 1.3 UG/L (ref 0–4)
RBC # BLD AUTO: 4.7 10E6/UL (ref 4.4–5.9)
SODIUM SERPL-SCNC: 128 MMOL/L (ref 133–144)
TRIGL SERPL-MCNC: 60 MG/DL
WBC # BLD AUTO: 5.7 10E3/UL (ref 4–11)

## 2021-11-18 PROCEDURE — 80053 COMPREHEN METABOLIC PANEL: CPT | Performed by: INTERNAL MEDICINE

## 2021-11-18 PROCEDURE — 84153 ASSAY OF PSA TOTAL: CPT | Performed by: INTERNAL MEDICINE

## 2021-11-18 PROCEDURE — 85027 COMPLETE CBC AUTOMATED: CPT | Performed by: INTERNAL MEDICINE

## 2021-11-18 PROCEDURE — 36415 COLL VENOUS BLD VENIPUNCTURE: CPT | Performed by: INTERNAL MEDICINE

## 2021-11-18 PROCEDURE — G0439 PPPS, SUBSEQ VISIT: HCPCS | Performed by: INTERNAL MEDICINE

## 2021-11-18 PROCEDURE — 99213 OFFICE O/P EST LOW 20 MIN: CPT | Mod: 25 | Performed by: INTERNAL MEDICINE

## 2021-11-18 PROCEDURE — 80061 LIPID PANEL: CPT | Performed by: INTERNAL MEDICINE

## 2021-11-18 RX ORDER — SILDENAFIL 100 MG/1
50-100 TABLET, FILM COATED ORAL DAILY PRN
Qty: 30 TABLET | Refills: 5 | Status: SHIPPED | OUTPATIENT
Start: 2021-11-18 | End: 2024-08-15

## 2021-11-18 RX ORDER — LOSARTAN POTASSIUM AND HYDROCHLOROTHIAZIDE 25; 100 MG/1; MG/1
1 TABLET ORAL EVERY MORNING
Qty: 90 TABLET | Refills: 3 | Status: SHIPPED | OUTPATIENT
Start: 2021-11-18 | End: 2021-12-21 | Stop reason: DRUGHIGH

## 2021-11-18 ASSESSMENT — ACTIVITIES OF DAILY LIVING (ADL): CURRENT_FUNCTION: NO ASSISTANCE NEEDED

## 2021-11-18 ASSESSMENT — MIFFLIN-ST. JEOR: SCORE: 1557.81

## 2021-11-18 NOTE — RESULT ENCOUNTER NOTE
"Your labs all look good, however the sodium is lower than it was last year.  It is not in a dangerous zone however, we need to take steps to make sure it does not go lower.  The HCTZ portion of your blood pressure tablet may be causing part of this, but I suspect it is more likely due to excessive alcohol (beer intake).  Follow these instructions:  --Stop losartan/hydrochlorothiazide  --Start plain losartan 100 mg once per day.  --Try the best you can to keep the intake of beer 2 can per day or less on most days  --Keep the total intake of all fluid (water, milk, beer, etc.) under 7 to 8 glasses/day  --Return for a \"lab only appointment\" right before Bon to recheck the sodium labs (nonfasting).  I will make contact either via phone or [employer] regarding the results and any recommendations once I have reviewed them.   "

## 2021-11-18 NOTE — PATIENT INSTRUCTIONS
"  *  Get Pfizer booster shot after 11/23/21 (6 months after the last one)    *  Continue all medications at the same doses.  Contact your usual pharmacy if you need refills.     *  Try to reduce the beer to no more than 2 cans per day.        *  Return to see me in 1 year, sooner if needed.  Use [facility] or Call [phone] to schedule the appointment with me.     5 GOALS TO PREVENT VASCULAR DISEASE:     1.  Aggressive blood pressure control, under 130/80 ideally.  Using medications if needed.    Your blood pressure is under good control    BP Readings from Last 4 Encounters:   11/18/21 128/76   11/20/20 132/76   08/29/19 115/60   07/28/19 124/76       2.  Aggressive LDL cholesterol (\"bad cholesterol\") lowering as indicated.    Your goal is an LDL under 130 for sure, preferably under 100.  (If you have diabetes or previous vascular disease, the the LDL goals would be under 100 for sure, preferably under 70.)    New guidelines identify four high-risk groups who could benefit from statins:   *people with pre-existing heart disease, such as those who have had a heart attack;   *people ages 40 to 75 who have diabetes of any type  *patients ages 40 to 75 with at least a 7.5% risk of developing cardiovascular disease over the next decade, according to a formula described in the guidelines  *patients with the sort of super-high cholesterol that sometimes runs in families, as evidenced by an LDL of 190 milligrams per deciliter or higher    Your cholesterol levels are well controlled.    Recent Labs   Lab Test 11/20/20  1151 02/26/18  0811 02/24/17  0919 02/23/15  0831   CHOL 207* 205*   < > 184   HDL 71 69   < > 66   * 121*   < > 105   TRIG 72 74   < > 65   CHOLHDLRATIO  --   --   --  2.8    < > = values in this interval not displayed.       3.  Aggressive diabetic prevention, screening and/or management.      You do not have diabetes as of the most recent blood tests.     4.  No smoking    5.  Consider daily " preventative aspirin over age 50 if you have enough cardiac risk factors to place you at higher risk for the presence of vascular disease.    If you have any reason not to take aspirin such easy bruising or bleeding, stomach problems, other anticoagulant medications, or any other side effects, then you should not take Aspirin.     --Based on your current cardiac disease risk profile and/or age over 75, you do NOT need to take daily preventative aspirin.        Preventive Health Recommendations:   Male Ages 65 and over    Yearly exam:             See your health care provider every year in order to  o   Review health changes.   o   Discuss preventive care.    o   Review your medicines if your doctor has prescribed any.    Talk with your health care provider about whether you should have a test to screen for prostate cancer (PSA).    Every 3 years, have a diabetes test (fasting glucose). If you are at risk for diabetes, you should have this test more often.    Every 5 years, have a cholesterol test. Have this test more often if you are at risk for high cholesterol or heart disease.     Every 10 years, have a colonoscopy. Or, have a yearly FIT test (stool test). These exams will check for colon cancer.    Talk to with your health care provider about screening for Abdominal Aortic Aneurysm if you have a family history of AAA or have a history of smoking.    Shots:     Get a flu shot each year.     Get a tetanus shot every 10 years.     Talk to your doctor about your pneumonia vaccines. There are now two you should receive - Pneumovax (PPSV 23) and Prevnar (PCV 13).     Talk to your doctor about a shingles vaccine.     Talk to your doctor about the hepatitis B vaccine.  Nutrition:     Eat at least 5 servings of fruits and vegetables each day.     Eat whole-grain bread, whole-wheat pasta and brown rice instead of white grains and rice.     Talk to your provider about Calcium and Vitamin D.      --Good Grains:  Oats, brown  "rice, Quinoa (these do not raise the blood sugar as much)     --Bad grains:  Anything made from wheat or white rice     (because these raise the blood sugars significantly, and the possible gluten issue from wheat for some people).      --Proteins:  Aim for \"lean proteins\" including chicken, fish, seafood, pork, turkey, and eggs (in moderation); Eat red meat only occasionally    Lifestyle    Exercise for at least 150 minutes a week (30 minutes a day, 5 days a week). This will help you control your weight and prevent disease.     Limit alcohol to one drink per day.     No smoking.     Wear sunscreen to prevent skin cancer.     See your dentist every six months for an exam and cleaning.     See your eye doctor every 1 to 2 years to screen for conditions such as glaucoma, macular degeneration, cataracts, etc               Patient Education   Personalized Prevention Plan  You are due for the preventive services outlined below.  Your care team is available to assist you in scheduling these services.  If you have already completed any of these items, please share that information with your care team to update in your medical record.  Health Maintenance Due   Topic Date Due     ANNUAL REVIEW OF HM ORDERS  Never done     Zoster (Shingles) Vaccine (1 of 2) Never done     LUNG CANCER SCREENING  Never done     PHQ-2  01/01/2021     Flu Vaccine (1) Never done     FALL RISK ASSESSMENT  11/20/2021     COVID-19 Vaccine (3 - Booster for Pfizer series) 11/23/2021       Understanding USDA MyPlate  The USDA has guidelines to help you make healthy food choices. These are called MyPlate. MyPlate shows the food groups that make up healthy meals using the image of a place setting. Before you eat, think about the healthiest choices for what to put on your plate or in your cup or bowl. To learn more about building a healthy plate, visit www.choosemyplate.gov.    The food groups    Fruits. Any fruit or 100% fruit juice counts as part of the " Fruit Group. Fruits may be fresh, canned, frozen, or dried, and may be whole, cut-up, or pureed. Make 1/2 of your plate fruits and vegetables.    Vegetables. Any vegetable or 100% vegetable juice counts as a member of the Vegetable Group. Vegetables may be fresh, frozen, canned, or dried. They can be served raw or cooked and may be whole, cut-up, or mashed. Make 1/2 of your plate fruits and vegetables.    Grains. All foods made from grains are part of the Grains Group. These include wheat, rice, oats, cornmeal, and barley. Grains are often used to make foods such as bread, pasta, oatmeal, cereal, tortillas, and grits. Grains should be no more than 1/4 of your plate. At least half of your grains should be whole grains.    Protein. This group includes meat, poultry, seafood, beans and peas, eggs, processed soy products (such as tofu), nuts (including nut butters), and seeds. Make protein choices no more than 1/4 of your plate. Meat and poultry choices should be lean or low fat.    Dairy. The Dairy Group includes all fluid milk products and foods made from milk that contain calcium, such as yogurt and cheese. (Foods that have little calcium, such as cream, butter, and cream cheese, are not part of this group.) Most dairy choices should be low-fat or fat-free.    Oils. Oils aren't a food group, but they do contain essential nutrients. However it's important to watch your intake of oils. These are fats that are liquid at room temperature. They include canola, corn, olive, soybean, vegetable, and sunflower oil. Foods that are mainly oil include mayonnaise, certain salad dressings, and soft margarines. You likely already get your daily oil allowance from the foods you eat.  Things to limit  Eating healthy also means limiting these things in your diet:       Salt (sodium). Many processed foods have a lot of sodium. To keep sodium intake down, eat fresh vegetables, meats, poultry, and seafood when possible. Purchase  low-sodium, reduced-sodium, or no-salt-added food products at the store. And don't add salt to your meals at home. Instead, season them with herbs and spices such as dill, oregano, cumin, and paprika. Or try adding flavor with lemon or lime zest and juice.    Saturated fat. Saturated fats are most often found in animal products such as beef, pork, and chicken. They are often solid at room temperature, such as butter. To reduce your saturated fat intake, choose leaner cuts of meat and poultry. And try healthier cooking methods such as grilling, broiling, roasting, or baking. For a simple lower-fat swap, use plain nonfat yogurt instead of mayonnaise when making potato salad or macaroni salad.    Added sugars. These are sugars added to foods. They are in foods such as ice cream, candy, soda, fruit drinks, sports drinks, energy drinks, cookies, pastries, jams, and syrups. Cut down on added sugars by sharing sweet treats with a family member or friend. You can also choose fruit for dessert, and drink water or other unsweetened beverages.     [employer] last reviewed this educational content on 6/1/2020 2000-2021 [employer]. All rights reserved. This information is not intended as a substitute for professional medical care. Always follow your healthcare professional's instructions.

## 2021-11-18 NOTE — PROGRESS NOTES
"SUBJECTIVE:   Juancarlos Ma is a 66 year old male who presents for Preventive Visit.      Patient has been advised of split billing requirements and indicates understanding: Yes   Are you in the first 12 months of your Medicare coverage?  No    Healthy Habits:    In general, how would you rate your overall health?  Good    Frequency of exercise:  4-5 days/week    Duration of exercise:  30-45 minutes    Do you usually eat at least 4 servings of fruit and vegetables a day, include whole grains    & fiber and avoid regularly eating high fat or \"junk\" foods?  No    Taking medications regularly:  Yes    Barriers to taking medications:  None    Medication side effects:  None    Ability to successfully perform activities of daily living:  No assistance needed    Home Safety:  No safety concerns identified    Hearing Impairment:  No hearing concerns    In the past 6 months, have you been bothered by leaking of urine?  No    In general, how would you rate your overall mental or emotional health?  Excellent      PHQ-2 Total Score:    Additional concerns today:  No    Do you feel safe in your environment? Yes    Have you ever done Advance Care Planning? (For example, a Health Directive, POLST, or a discussion with a medical provider or your loved ones about your wishes):        Fall risk  Fallen 2 or more times in the past year?: No  Any fall with injury in the past year?: No    Cognitive Screening   1) Repeat 3 items (Leader, Season, Table)    2) Clock draw: NORMAL  3) 3 item recall: Recalls 2 objects   Results: NORMAL clock, 1-2 items recalled: COGNITIVE IMPAIRMENT LESS LIKELY    Mini-CogTM Copyright ALFREDO Harris. Licensed by the author for use in [facility]; reprinted with permission (nick@.[facility]). All rights reserved.      Do you have sleep apnea, excessive snoring or daytime drowsiness?: no    Reviewed and updated as needed this visit by clinical staff  Tobacco  Allergies  Meds  Problems            Reviewed " and updated as needed this visit by Provider   Allergies  Meds  Problems           Social History     Tobacco Use     Smoking status: Former Smoker     Packs/day: 0.50     Years: 40.00     Pack years: 20.00     Types: Cigarettes     Quit date: 2014     Years since quittin.9     Smokeless tobacco: Never Used     Tobacco comment: quit    Substance Use Topics     Alcohol use: Yes     Alcohol/week: 16.7 standard drinks     Types: 20 Cans of beer per week     Comment: daily     If you drink alcohol do you typically have >3 drinks per day or >7 drinks per week? No    Alcohol Use 2021   Prescreen: >3 drinks/day or >7 drinks/week? No   AUDIT SCORE  -         1.    Hypertension:  History of hypertension, on medication.  No reported side effects from medications.    Reviewed last 6 BP readings in chart:  BP Readings from Last 6 Encounters:   21 128/76   20 132/76   19 115/60   19 124/76   19 102/60   19 131/64     No active cardiac complaints or symptoms with exercise.     2.  Hyperlipidemia:  Has history of hyperlipidemia.    The patient is taking a medication for this.  Denies any significant side effects from his medication.      Latest labs reviewed:    Recent Labs   Lab Test 21  1044 20  1151 17  0919 02/23/15  0831   CHOL 191 207*   < > 184   HDL 72 71   < > 66   * 122*   < > 105   TRIG 60 72   < > 65   CHOLHDLRATIO  --   --   --  2.8    < > = values in this interval not displayed.        Lab Results   Component Value Date    AST 20 2021    AST 8 06/15/2019         3.  history of prostate cancer, treated with radical prostatectomy in . .   Lab Results   Component Value Date    PSA 1.30 2021    PSA 1.08 2020    PSA 0.92 2018    PSA 0.52 2018    PSA 0.45 2017    PSA 0.37 2016    PSA 0.17 2015    PSA <0.10 2014    PSA <0.04 2013    PSA 19.50 2011    PSA 19.50  "04/25/2011    PSA 10.30 09/29/2008    PSA 10.70 07/30/2007        4.  Taking Sildenafil (generic Viagra) as needed for erectile dysfunction.  He has not experienced any significant side effects of this medication.  It does work well for him, he would like to continue on it.     5.  history of partial colectomy for recurrent diverticulitiis.   Reports bowels have never returned to the same quality and consistency.   Not outside of normal ranges, but just more inconsistent.     Current providers sharing in care for this patient include:   Patient Care Team:  Wili Arevalo MD as PCP - General  Wili Arevalo MD as Assigned PCP    The following health maintenance items are reviewed in Epic and correct as of today:  Health Maintenance Due   Topic Date Due     ANNUAL REVIEW OF  ORDERS  Never done     ZOSTER IMMUNIZATION (1 of 2) Never done     LUNG CANCER SCREENING  Never done     PHQ-2  01/01/2021     INFLUENZA VACCINE (1) Never done     COVID-19 Vaccine (3 - Booster for Pfizer series) 11/23/2021         **I reviewed the information recorded in the patient's [facility] chart (including but not limited to medical history, surgical history, family history, problem list, medication list, and allergy list) and updated the information as indicated based on the patients reported information.           Review of Systems  Constitutional, HEENT, cardiovascular, pulmonary, gi and gu systems are negative, except as otherwise noted.    OBJECTIVE:   /76   Pulse 66   Temp 98  F (36.7  C) (Tympanic)   Resp 16   Ht 1.676 m (5' 6\")   Wt 84 kg (185 lb 3.2 oz)   SpO2 95%   BMI 29.89 kg/m   Estimated body mass index is 29.89 kg/m  as calculated from the following:    Height as of this encounter: 1.676 m (5' 6\").    Weight as of this encounter: 84 kg (185 lb 3.2 oz).  Physical Exam  GENERAL alert and no distress  EYES:  Normal sclera,conjunctiva, EOMI  HENT: oral and posterior pharynx without lesions or " erythema, facies symmetric  NECK: Neck supple. No LAD, without thyroidmegaly.  RESP: Clear to ausculation bilaterally without wheezes or crackles. Normal BS in all fields.  CV: RRR normal S1S2 without murmurs, rubs or gallops.  LYMPH: no cervical lymph adenopathy appreciated  MS: extremities- no gross deformities of the visible extremities noted,   EXT:  no lower extremity edema  PSYCH: Alert and oriented times 3; speech- coherent  SKIN:  No obvious significant skin lesions on visible portions of face     Diagnostic Test Results:  Labs reviewed in Epic    ASSESSMENT / PLAN:     (Z00.00) Encounter for Medicare annual wellness exam  (primary encounter diagnosis)  Comment: Discussed cardiac disease risk factors and cardiac disease risk factor modification, including diabetes screening, blood pressure screening (and management if indicated), and cholesterol screening.   Reviewed immunzation guidelines, including pneumococcal vaccines, annual influenza, and shingles vaccines.   Discussed routine cancer screenings, including skin cancer, colon cancer screening for everyone until age 80, prostate cancer screening in men until age 75, mammogram and PAP/pelvic for women until age 75.   Recommended regular dentist visits to care for remaining teeth.   Recommended regular screening for vision and glaucoma.   Recommended safe driving and accident avoidance.   Plan:     (I10) Essential hypertension, benign  Comment: This condition is currently controlled on the current medical regimen.  Continue current therapy.   Plan: losartan-hydrochlorothiazide (HYZAAR) 100-25 MG        tablet, Comprehensive metabolic panel, Lipid         panel reflex to direct LDL Fasting, CBC with         platelets, losartan (COZAAR) 100 MG tablet            (E78.5) Hyperlipidemia LDL goal <130  Comment: This condition is currently controlled on the current medical regimen.  Continue current therapy.   Plan: Comprehensive metabolic panel, Lipid panel        "  reflex to direct LDL Fasting, CBC with         platelets            (C61) Prostate cancer (H)  Comment: continue to monitor annually.   Plan: PSA, screen            (Z90.49) S/P sigmoid colectomy  Comment: This condition is currently controlled on the current medical regimen.  Continue current therapy.   Plan:     (Z12.5) Screening for prostate cancer  Comment:   Plan: PSA, screen            (Z13.6) CARDIOVASCULAR SCREENING; LDL GOAL LESS THAN 130  Comment: Discussed cardiac disease risk factors and cardiac disease risk factor modification.   Plan: Comprehensive metabolic panel, Lipid panel         reflex to direct LDL Fasting, CBC with         platelets            (N52.31) Erectile dysfunction following radical prostatectomy  Comment: This condition is currently controlled on the current medical regimen.  Continue current therapy.   He has not experienced any significant side effects of this medication.   Plan: sildenafil (VIAGRA) 100 MG tablet               Patient has been advised of split billing requirements and indicates understanding: Yes  COUNSELING:  Reviewed preventive health counseling, as reflected in patient instructions       Regular exercise       Healthy diet/nutrition       Vision screening       Hearing screening       Dental care       Bladder control       Immunizations    up to date              Colon cancer screening       Prostate cancer screening    Estimated body mass index is 29.89 kg/m  as calculated from the following:    Height as of this encounter: 1.676 m (5' 6\").    Weight as of this encounter: 84 kg (185 lb 3.2 oz).        He reports that he quit smoking about 6 years ago. His smoking use included cigarettes. He has a 20.00 pack-year smoking history. He has never used smokeless tobacco.      Appropriate preventive services were discussed with this patient, including applicable screening as appropriate for cardiovascular disease, diabetes, osteopenia/osteoporosis, and glaucoma.  As " appropriate for age/gender, discussed screening for colorectal cancer, prostate cancer, breast cancer, and cervical cancer. Checklist reviewing preventive services available has been given to the patient.    Reviewed patients plan of care and provided an AVS. The  stefany Bauer meets the Care Plan requirement. This Care Plan has been established and reviewed with the .    Counseling Resources:  ATP IV Guidelines  Pooled Cohorts Equation Calculator  Breast Cancer Risk Calculator  Breast Cancer: Medication to Reduce Risk  FRAX Risk Assessment  ICSI Preventive Guidelines  Dietary Guidelines for Americans, 2010  USDA's MyPlate  ASA Prophylaxis  Lung CA Screening    Wili Arevalo MD  [employer]    Identified Health Risks:

## 2021-11-23 ENCOUNTER — TELEPHONE (OUTPATIENT)
Dept: INTERNAL MEDICINE | Facility: CLINIC | Age: 66
End: 2021-11-23
Payer: COMMERCIAL

## 2021-11-23 RX ORDER — LOSARTAN POTASSIUM 100 MG/1
100 TABLET ORAL EVERY MORNING
Qty: 90 TABLET | Refills: 1 | Status: SHIPPED | OUTPATIENT
Start: 2021-11-23 | End: 2022-05-26

## 2021-11-23 NOTE — TELEPHONE ENCOUNTER
Pt was notified by Dr. Arevalo of his lab results.   But says the losartan was never sent to pharmacy. Does not have any at home.   Per notes, as requested, RX for losartan 100mg refilled to [facility].  Pt will return in 1 months for repeat labs.

## 2021-12-21 ENCOUNTER — LAB (OUTPATIENT)
Dept: LAB | Facility: CLINIC | Age: 66
End: 2021-12-21
Payer: MEDICARE

## 2021-12-21 ENCOUNTER — DOCUMENTATION ONLY (OUTPATIENT)
Dept: LAB | Facility: CLINIC | Age: 66
End: 2021-12-21
Payer: COMMERCIAL

## 2021-12-21 DIAGNOSIS — I10 ESSENTIAL HYPERTENSION, BENIGN: ICD-10-CM

## 2021-12-21 LAB
ANION GAP SERPL CALCULATED.3IONS-SCNC: 4 MMOL/L (ref 3–14)
BUN SERPL-MCNC: 12 MG/DL (ref 7–30)
CALCIUM SERPL-MCNC: 9.9 MG/DL (ref 8.5–10.1)
CHLORIDE BLD-SCNC: 101 MMOL/L (ref 94–109)
CO2 SERPL-SCNC: 29 MMOL/L (ref 20–32)
CREAT SERPL-MCNC: 1.03 MG/DL (ref 0.66–1.25)
GFR SERPL CREATININE-BSD FRML MDRD: 80 ML/MIN/1.73M2
GLUCOSE BLD-MCNC: 93 MG/DL (ref 70–99)
HOLD SPECIMEN: NORMAL
POTASSIUM BLD-SCNC: 4.9 MMOL/L (ref 3.4–5.3)
SODIUM SERPL-SCNC: 134 MMOL/L (ref 133–144)

## 2021-12-21 PROCEDURE — 36415 COLL VENOUS BLD VENIPUNCTURE: CPT

## 2021-12-21 PROCEDURE — 80048 BASIC METABOLIC PNL TOTAL CA: CPT | Performed by: INTERNAL MEDICINE

## 2021-12-21 NOTE — PROGRESS NOTES
Please place or confirm orders for upcoming lab appointment on TODAY AT 12:00PM 12/21/2021. Thank you.

## 2022-04-18 ENCOUNTER — NURSE TRIAGE (OUTPATIENT)
Dept: INTERNAL MEDICINE | Facility: CLINIC | Age: 67
End: 2022-04-18
Payer: COMMERCIAL

## 2022-04-18 ENCOUNTER — OFFICE VISIT (OUTPATIENT)
Dept: URGENT CARE | Facility: URGENT CARE | Age: 67
End: 2022-04-18
Payer: MEDICARE

## 2022-04-18 ENCOUNTER — ANCILLARY PROCEDURE (OUTPATIENT)
Dept: GENERAL RADIOLOGY | Facility: CLINIC | Age: 67
End: 2022-04-18
Attending: FAMILY MEDICINE
Payer: COMMERCIAL

## 2022-04-18 VITALS
BODY MASS INDEX: 29.86 KG/M2 | WEIGHT: 185 LBS | OXYGEN SATURATION: 95 % | TEMPERATURE: 98 F | DIASTOLIC BLOOD PRESSURE: 80 MMHG | RESPIRATION RATE: 20 BRPM | SYSTOLIC BLOOD PRESSURE: 148 MMHG | HEART RATE: 91 BPM

## 2022-04-18 DIAGNOSIS — R06.2 WHEEZE: ICD-10-CM

## 2022-04-18 DIAGNOSIS — R05.9 COUGH: ICD-10-CM

## 2022-04-18 DIAGNOSIS — R07.89 CHEST TIGHTNESS: ICD-10-CM

## 2022-04-18 DIAGNOSIS — J44.1 COPD EXACERBATION (H): ICD-10-CM

## 2022-04-18 DIAGNOSIS — R06.02 SOB (SHORTNESS OF BREATH): Primary | ICD-10-CM

## 2022-04-18 PROCEDURE — 99214 OFFICE O/P EST MOD 30 MIN: CPT | Mod: 25 | Performed by: FAMILY MEDICINE

## 2022-04-18 PROCEDURE — 71046 X-RAY EXAM CHEST 2 VIEWS: CPT | Performed by: RADIOLOGY

## 2022-04-18 PROCEDURE — 94640 AIRWAY INHALATION TREATMENT: CPT | Performed by: FAMILY MEDICINE

## 2022-04-18 RX ORDER — IPRATROPIUM BROMIDE AND ALBUTEROL SULFATE 2.5; .5 MG/3ML; MG/3ML
3 SOLUTION RESPIRATORY (INHALATION) ONCE
Status: COMPLETED | OUTPATIENT
Start: 2022-04-18 | End: 2022-04-18

## 2022-04-18 RX ORDER — PREDNISONE 20 MG/1
40 TABLET ORAL ONCE
Status: COMPLETED | OUTPATIENT
Start: 2022-04-18 | End: 2022-04-18

## 2022-04-18 RX ORDER — AZITHROMYCIN 250 MG/1
TABLET, FILM COATED ORAL
Qty: 6 TABLET | Refills: 0 | Status: SHIPPED | OUTPATIENT
Start: 2022-04-18 | End: 2022-04-23

## 2022-04-18 RX ORDER — PREDNISONE 20 MG/1
20 TABLET ORAL 2 TIMES DAILY
Qty: 8 TABLET | Refills: 0 | Status: SHIPPED | OUTPATIENT
Start: 2022-04-19 | End: 2022-04-23

## 2022-04-18 RX ADMIN — IPRATROPIUM BROMIDE AND ALBUTEROL SULFATE 3 ML: 2.5; .5 SOLUTION RESPIRATORY (INHALATION) at 15:06

## 2022-04-18 RX ADMIN — PREDNISONE 40 MG: 20 TABLET ORAL at 15:05

## 2022-04-18 NOTE — TELEPHONE ENCOUNTER
"Nurse Triage SBAR    Is this a 2nd Level Triage? YES, LICENSED PRACTITIONER REVIEW IS REQUIRED    Situation: Cold symptoms since Friday, increased constant SOB and productive cough since yesterday. Neg at home covid test yesterday     Background:     Assessment: See below    Protocol Recommended Disposition:   Go To Office Now    Recommendation:    Routed to provider    Does the patient meet one of the following criteria for ADS visit consideration? 16+ years old, with an [facility] PCP     TIP  Providers, please consider if this condition is appropriate for management at one of our Acute and Diagnostic Services sites.     If patient is a good candidate, please use dotphrase <dot>triageresponse and select Refer to ADS to document.    Reason for Disposition    MILD difficulty breathing (e.g., minimal/no SOB at rest, SOB with walking, pulse < 100) of new onset or worse than normal    Additional Information    Negative: Breathing stopped and hasn't returned    Negative: Choking on something    Negative: SEVERE difficulty breathing (e.g., struggling for each breath, speaks in single words, pulse > 120)    Negative: Bluish (or gray) lips or face    Negative: Difficult to awaken or acting confused (e.g., disoriented, slurred speech)    Negative: Passed out (i.e., fainted, collapsed and was not responding)    Negative: Wheezing started suddenly after medicine, an allergic food, or bee sting    Negative: Stridor    Negative: Slow, shallow and weak breathing    Negative: Sounds like a life-threatening emergency to the triager    Negative: MODERATE difficulty breathing (e.g., speaks in phrases, SOB even at rest, pulse 100-120) of new onset or worse than normal    Negative: Wheezing can be heard across the room    Negative: Drooling or spitting out saliva (because can't swallow)    Negative: Any history of prior \"blood clot\" in leg or lungs    Negative: Illness requiring prolonged bedrest in past month (e.g., immobilization, long " "hospital stay)    Negative: Hip or leg fracture (broken bone) in past month (or had cast on leg or ankle in past month)    Negative: Major surgery in the past month    Negative: Long-distance travel in past month (e.g., car, bus, train, plane; with trip lasting 6 or more hours)    Negative: Extra heart beats OR irregular heart beating   (i.e., \"palpitations\")    Negative: Fever > 103 F (39.4 C)    Negative: Fever > 101 F (38.3 C) and over 60 years of age    Negative: Fever > 100.0 F (37.8 C) and bedridden (e.g., nursing home patient, stroke, chronic illness, recovering from surgery)    Negative: Fever > 100.0 F (37.8 C) and diabetes mellitus or weak immune system (e.g., HIV positive, cancer chemo, splenectomy, organ transplant, chronic steroids)    Negative: Periods where breathing stops and then resumes normally and bedridden (e.g., nursing home patient, CVA)    Negative: Pregnant or postpartum (from 0 to 6 weeks after delivery)    Negative: Patient sounds very sick or weak to the triager    Answer Assessment - Initial Assessment Questions  1. RESPIRATORY STATUS: \"Describe your breathing?\" (e.g., wheezing, shortness of breath, unable to speak, severe coughing)       SOB- pt states it \"feels like you ran around block\", breathing quite a bit faster, breathing worse with lying down    2. ONSET: \"When did this breathing problem begin?\"   Developed cold on Friday increased SOB and coughing started last night        3. PATTERN \"Does the difficult breathing come and go, or has it been constant since it started?\"       Constant but severity changes    4. SEVERITY: \"How bad is your breathing?\" (e.g., mild, moderate, severe)     - MILD: No SOB at rest, mild SOB with walking, speaks normally in sentences, can lay down, no retractions, pulse < 100.     - MODERATE: SOB at rest, SOB with minimal exertion and prefers to sit, cannot lie down flat, speaks in phrases, mild retractions, audible wheezing, pulse 100-120.     - SEVERE: " "Very SOB at rest, speaks in single words, struggling to breathe, sitting hunched forward, retractions, pulse > 120     Mild per pt         5. RECURRENT SYMPTOM: \"Have you had difficulty breathing before?\" If so, ask: \"When was the last time?\" and \"What happened that time?\"       No     6. CARDIAC HISTORY: \"Do you have any history of heart disease?\" (e.g., heart attack, angina, bypass surgery, angioplasty)       HTN    7. LUNG HISTORY: \"Do you have any history of lung disease?\"  (e.g., pulmonary embolus, asthma, emphysema)      Quit smoking 3 years ago, not told I have COPD    8. CAUSE: \"What do you think is causing the breathing problem?\"      Cold symptoms would like to r/o PNA    9. OTHER SYMPTOMS: \"Do you have any other symptoms? (e.g., dizziness, runny nose, cough, chest pain, fever)  Productive cough- feels very thick, yellow mucus,  Negative covid test yesterday       10. PREGNANCY: \"Is there any chance you are pregnant?\" \"When was your last menstrual period?\"        na  11. TRAVEL: \"Have you traveled out of the country in the last month?\" (e.g., travel history, exposures)        No    Protocols used: BREATHING DIFFICULTY-A-OH    CALL BACK IF:  * Severe difficulty breathing occurs  * Fever more than 100.4 F (38.0 C)  * You become worse      FEVER MEDICINES - EXTRA NOTES AND WARNINGS:  * Use the lowest amount of medicine that makes your fever better.  * Acetaminophen is thought to be safer than ibuprofen or naproxen in people over 65 years old. Acetaminophen is in many OTC and prescription medicines. It might be in more than one medicine that you are taking. You need to be careful and not take an overdose. An acetaminophen overdose can hurt the liver.  * [employer], the company that makes Tylenol, has different dosage instructions for Tylenol in [place] and the [place]. In [place], the maximum recommended dose per day is 4,000 mg or twelve Regular-Strength (325 mg) pills. In the [place], [employer] recommends " a maximum dose of ten Regular-Strength (325 mg) pills.  * CAUTION: Do not take acetaminophen if you have liver disease.  * CAUTION: Do not take ibuprofen if you have stomach problems, kidney disease, are pregnant, or have been told by your doctor to avoid this type of anti-inflammatory drug. Do not take ibuprofen for more than 7 days without consulting your doctor.  * Before taking any medicine, read all the instructions on the package.        Patient/Caregiver understands and will follow care advice? Yes, plans to follow advice     Kalyn GROSSMAN RN  EP Triage

## 2022-04-18 NOTE — PROGRESS NOTES
SUBJECTIVE: Juancarlos Ma is a 67 year old male presenting with a chief complaint of cough and SOB.  Onset of symptoms was 3 day(s) ago.  Course of illness is worsening.    Severity moderate  Current and Associated symptoms: cough - productive  Treatment measures tried include none.  Predisposing factors include HX of COPD.    Past Medical History:   Diagnosis Date     Diverticulosis of colon 10/2/12    incidental sigmoid diverticulosis seen on routine colonoscopy, no h/o diverticulitis     Essential hypertension, benign      Fracture of fifth metacarpal bone of left hand 2017    intra-articular left 5th metacarpal base fracture with minimally dispalced left radial styloid fracture; no operative management     Prostate cancer (H)      S/P partial colectomy 2019    s/p robotic assisted sigmoid colectomy for recurrent diverticulitis     Serrated adenoma of colon 10/2/12    2 sessile serrated adenomatous polyps removed at colonoscopy; 1 tubular adenoma     Allergies   Allergen Reactions     Lisinopril      Cough with Lisinopril     Penicillin [Penicillins] Hives     Social History     Tobacco Use     Smoking status: Former Smoker     Packs/day: 0.50     Years: 40.00     Pack years: 20.00     Types: Cigarettes     Quit date: 2014     Years since quittin.3     Smokeless tobacco: Never Used     Tobacco comment: quit    Substance Use Topics     Alcohol use: Yes     Alcohol/week: 16.7 standard drinks     Types: 20 Cans of beer per week     Comment: daily       ROS:  SKIN: no rash  GI: no vomiting    OBJECTIVE:  BP (!) 148/80   Pulse 91   Temp 98  F (36.7  C)   Resp 20   Wt 83.9 kg (185 lb)   SpO2 95%   BMI 29.86 kg/m  GENERAL APPEARANCE: healthy, alert and no distress  EYES: EOMI,  PERRL, conjunctiva clear  HENT: ear canals and TM's normal.  Nose and mouth without ulcers, erythema or lesions  RESP: expiratory wheezes throughout  SKIN: no suspicious lesions or rashes    Xray without  acute findings, no pneumonia read by Joe Matos D.O.    Pt felt much improved at dc      ICD-10-CM    1. SOB (shortness of breath)  R06.02 ipratropium - albuterol 0.5 mg/2.5 mg/3 mL (DUONEB) neb solution 3 mL     predniSONE (DELTASONE) tablet 40 mg     XR Chest 2 Views     predniSONE (DELTASONE) 20 MG tablet     ipratropium-albuterol (COMBIVENT RESPIMAT)  MCG/ACT inhaler   2. Cough  R05.9 ipratropium - albuterol 0.5 mg/2.5 mg/3 mL (DUONEB) neb solution 3 mL     predniSONE (DELTASONE) tablet 40 mg     XR Chest 2 Views     predniSONE (DELTASONE) 20 MG tablet     ipratropium-albuterol (COMBIVENT RESPIMAT)  MCG/ACT inhaler   3. Chest tightness  R07.89 ipratropium - albuterol 0.5 mg/2.5 mg/3 mL (DUONEB) neb solution 3 mL     predniSONE (DELTASONE) tablet 40 mg     XR Chest 2 Views     predniSONE (DELTASONE) 20 MG tablet     ipratropium-albuterol (COMBIVENT RESPIMAT)  MCG/ACT inhaler   4. COPD exacerbation (H)  J44.1 ipratropium - albuterol 0.5 mg/2.5 mg/3 mL (DUONEB) neb solution 3 mL     predniSONE (DELTASONE) tablet 40 mg     XR Chest 2 Views     predniSONE (DELTASONE) 20 MG tablet     azithromycin (ZITHROMAX) 250 MG tablet     ipratropium-albuterol (COMBIVENT RESPIMAT)  MCG/ACT inhaler   5. Wheeze  R06.2 ipratropium - albuterol 0.5 mg/2.5 mg/3 mL (DUONEB) neb solution 3 mL     predniSONE (DELTASONE) tablet 40 mg     XR Chest 2 Views     predniSONE (DELTASONE) 20 MG tablet     ipratropium-albuterol (COMBIVENT RESPIMAT)  MCG/ACT inhaler     ED if worse  Fluids/Rest, f/u if worse/not any better

## 2022-05-23 DIAGNOSIS — I10 ESSENTIAL HYPERTENSION, BENIGN: ICD-10-CM

## 2022-05-25 ENCOUNTER — APPOINTMENT (OUTPATIENT)
Dept: URBAN - METROPOLITAN AREA CLINIC 256 | Age: 67
Setting detail: DERMATOLOGY
End: 2022-05-25

## 2022-05-25 VITALS — RESPIRATION RATE: 16 BRPM | HEIGHT: 66 IN | WEIGHT: 185 LBS

## 2022-05-25 DIAGNOSIS — L82.1 OTHER SEBORRHEIC KERATOSIS: ICD-10-CM

## 2022-05-25 DIAGNOSIS — D22 MELANOCYTIC NEVI: ICD-10-CM

## 2022-05-25 DIAGNOSIS — L91.8 OTHER HYPERTROPHIC DISORDERS OF THE SKIN: ICD-10-CM

## 2022-05-25 DIAGNOSIS — L82.0 INFLAMED SEBORRHEIC KERATOSIS: ICD-10-CM

## 2022-05-25 DIAGNOSIS — L57.0 ACTINIC KERATOSIS: ICD-10-CM

## 2022-05-25 PROBLEM — D22.4 MELANOCYTIC NEVI OF SCALP AND NECK: Status: ACTIVE | Noted: 2022-05-25

## 2022-05-25 PROCEDURE — OTHER PRESCRIPTION: OTHER

## 2022-05-25 PROCEDURE — OTHER COUNSELING: OTHER

## 2022-05-25 PROCEDURE — OTHER SKIN TAG REMOVAL: OTHER

## 2022-05-25 PROCEDURE — 11305 SHAVE SKIN LESION 0.5 CM/<: CPT | Mod: 59

## 2022-05-25 PROCEDURE — OTHER SHAVE REMOVAL: OTHER

## 2022-05-25 PROCEDURE — OTHER LIQUID NITROGEN: OTHER

## 2022-05-25 PROCEDURE — 99213 OFFICE O/P EST LOW 20 MIN: CPT | Mod: 25

## 2022-05-25 PROCEDURE — OTHER MIPS QUALITY: OTHER

## 2022-05-25 PROCEDURE — 11200 RMVL SKIN TAGS UP TO&INC 15: CPT | Mod: 59

## 2022-05-25 PROCEDURE — 17110 DESTRUCT B9 LESION 1-14: CPT

## 2022-05-25 PROCEDURE — OTHER ADDITIONAL NOTES: OTHER

## 2022-05-25 RX ORDER — FLUOROURACIL 5 MG/G
5% CREAM TOPICAL BID
Qty: 40 | Refills: 1 | Status: ERX

## 2022-05-25 ASSESSMENT — LOCATION SIMPLE DESCRIPTION DERM
LOCATION SIMPLE: RIGHT CHEEK
LOCATION SIMPLE: LEFT ANTERIOR NECK
LOCATION SIMPLE: RIGHT ANTERIOR NECK
LOCATION SIMPLE: LEFT FOREHEAD
LOCATION SIMPLE: POSTERIOR NECK
LOCATION SIMPLE: NOSE
LOCATION SIMPLE: RIGHT SCALP
LOCATION SIMPLE: LEFT CHEEK

## 2022-05-25 ASSESSMENT — LOCATION DETAILED DESCRIPTION DERM
LOCATION DETAILED: RIGHT INFERIOR LATERAL NECK
LOCATION DETAILED: RIGHT CENTRAL FRONTAL SCALP
LOCATION DETAILED: RIGHT MEDIAL MALAR CHEEK
LOCATION DETAILED: RIGHT INFERIOR POSTERIOR NECK
LOCATION DETAILED: RIGHT CLAVICULAR NECK
LOCATION DETAILED: NASAL DORSUM
LOCATION DETAILED: LEFT SUPERIOR MEDIAL FOREHEAD
LOCATION DETAILED: LEFT INFERIOR LATERAL MALAR CHEEK
LOCATION DETAILED: LEFT CLAVICULAR NECK
LOCATION DETAILED: LEFT MEDIAL MALAR CHEEK

## 2022-05-25 ASSESSMENT — LOCATION ZONE DERM
LOCATION ZONE: SCALP
LOCATION ZONE: NOSE
LOCATION ZONE: FACE
LOCATION ZONE: NECK

## 2022-05-25 NOTE — PROCEDURE: ADDITIONAL NOTES
Additional Notes: Recommended FBE \\n\\nA clinical assistant was present for the exam. Care instructions of treated sites were explained to the patient in detail. Told patient to call with any concerns or questions. The patient verbalized understanding and agreement of the education provided and the treatment plan. Encouraged patient to schedule a follow up appointment right after visit. At the end of the visit, all questions had been answered and the patient was satisfied with the visit.
Detail Level: Simple
Render Risk Assessment In Note?: no

## 2022-05-25 NOTE — PROCEDURE: LIQUID NITROGEN
Render Post-Care Instructions In Note?: yes
Include Z78.9 (Other Specified Conditions Influencing Health Status) As An Associated Diagnosis?: No
Medical Necessity Clause: This procedure was medically necessary because the lesions that were treated were:
Spray Paint Text: The liquid nitrogen was applied to the skin utilizing a spray paint frosting technique.
Medical Necessity Information: It is in your best interest to select a reason for this procedure from the list below. All of these items fulfill various CMS LCD requirements except the new and changing color options.
Consent: - Verbal and written consent was obtained, and risks were reviewed prior to procedure today. \\n- Risks discussed include but are not limited to pain, crusting, scabbing, blistering, scarring, temporary or permanent darker or lighter pigmentary change, recurrence, incomplete resolution, and infection.
Detail Level: Detailed
Post-Care Instructions: - Avoid picking at any of the treated lesions.

## 2022-05-25 NOTE — TELEPHONE ENCOUNTER
Routing refill request to provider for review/approval because:  Last BP > 140/90 in past 12 months     BP Readings from Last 3 Encounters:   04/18/22 (!) 148/80   11/18/21 128/76   11/20/20 132/76

## 2022-05-25 NOTE — PROCEDURE: SHAVE REMOVAL
X Size Of Lesion In Cm (Optional): 0
Bill For Surgical Tray: no
Detail Level: Detailed
Medical Necessity Information: It is in your best interest to select a reason for this procedure from the list below. All of these items fulfill various CMS LCD requirements except the new and changing color options.
Notification Instructions: - It can take up to 2 weeks to get a pathology result. Please refrain from calling to request results until after 2 weeks.
Size Of Lesion In Cm (Required): 0.5
Wound Care: Petrolatum
Anesthesia Type: 2% lidocaine with epinephrine
Was A Bandage Applied: Yes
Billing Type: Third-Party Bill
Hemostasis: Drysol
Medical Necessity Clause: This procedure was medically necessary because the lesion that was treated was:
Biopsy Method: Dermablade
Consent: - Verbal and written consent was obtained, and risks were reviewed prior to procedure today. Risks discussed include but are not limited to scarring, infection, bleeding, scabbing, incomplete removal, recurrence, pain, and allergy to anesthesia.\\n- Prior to the procedure, the treatment site was clearly identified and confirmed by the patient. All components of Universal Protocol/PAUSE Rule completed.
Post-Care Instructions: WOUND CARE:\\nDo NOT submerge wound in a bath, swimming pool, or hot tub for at least 1 week or for as long as there is an open wound. Gently cleanse the site daily with mild soap and water. Be careful NOT to allow a forceful stream of water to hit the biopsy site. After cleaning/showering, apply a thin layer of petrolatum ointment or Aquaphor in the wound followed by an adhesive bandage. Continue this process daily until healed. \\n\\nBLEEDING:\\nIf you develop persistent bleeding, apply firm and steady pressure over the dressing with gauze for 15 minutes. If bleeding persists, reapply pressure with an ice pack over the gauze for 15 minutes. NEVER APPLY ICE DIRECTLY TO THE SKIN. Do NOT peek under the gauze during these 15 minutes of pressure.  Call our office at [phone] if you cannot get the bleeding to stop. \\n\\nINFECTION:\\nSigns of infection may include increasing rather than decreasing pain (after the first few days), increasing redness/swelling/heat, draining pus, pink/red streaks around the wound, and fever or chills.  Please call our office immediately at [phone] if infection is suspected. It is normal to have some mild redness on or around the wound edges; this will lighten day by day and will become less tender.\\n\\nPAIN:\\nPain is usually minimal, but if needed you may take acetaminophen (Tylenol) every four hours as needed. Applying an ice pack over the dressing for 15-20 minutes every 2-3 hours will relieve swelling, lessen pain, and help minimize bruising. NEVER APPLY ICE DIRECTLY TO THE SKIN. Avoid ibuprofen (Advil, Motrin) and naproxen (Aleve) for the first 48 hours as these can increase bleeding.\\n\\nSWELLING AND BRUISING:\\nSwelling and bruising are common and temporary, usually lasting 1 - 2 weeks. It is more common in areas treated around the eyes, hands, and feet. In the days following the procedure, swelling and bruising can be minimized by keeping the affected areas elevated when possible, reducing salty foods, and applying ice packs over the dressing for 15-20 minutes every 2-3 hours. NEVER APPLY ICE DIRECTLY TO THE SKIN.\\n\\nITCHING:\\nItchiness on and around the wound is very common and can last several days to weeks after surgery. Mild itch is normal as the wound is healing. \\n\\nNERVE CHANGES:\\nNumbness is usually temporary, but it may last for several weeks to months. You may also experience sharp pains at the wound sight as it heals.  Mild pain is normal and will gradually improve with time.\\n \\nNO SMOKING:\\nSmoking will delay the healing process. If you smoke, we recommend trying to quit or at minimum reduce how much you smoke following a procedure.

## 2022-05-25 NOTE — HPI: EVALUATION OF SKIN LESION(S)
What Type Of Note Output Would You Prefer (Optional)?: Standard Output
Hpi Title: Evaluation of Skin Lesions
Additional History: Multiple skin lesions he would like evaluated on his face and neck. His main concern is that his sister just had a large skin cancer on her face, and he just had a big scab on his nose that last a month. He thought he should be evaluated.

## 2022-05-25 NOTE — PROCEDURE: SKIN TAG REMOVAL
Anesthesia Volume In Cc: 0.1
Include Z78.9 (Other Specified Conditions Influencing Health Status) As An Associated Diagnosis?: No
Consent: - Verbal and written consent was obtained, and risks were reviewed prior to procedure today. \\n- Risks discussed include but are not limited to bleeding, pigmentary change, infection, pain, and remote possibility of scarring. \\n- The patient understands that the procedure is cosmetic in nature and is not covered by or billable to insurance.
Anesthesia Type: 2% lidocaine with epinephrine
Detail Level: Detailed
Medical Necessity Clause: This procedure was medically necessary because the lesions that were treated were:
Medical Necessity Information: It is in your best interest to select a reason for this procedure from the list below. All of these items fulfill various CMS LCD requirements except the new and changing color options.
Add Associated Diagnoses If Applicable When Selecting Medical Necessity: Yes

## 2022-05-25 NOTE — PROCEDURE: COUNSELING
Detail Level: Zone
Detail Level: Simple
Detail Level: Detailed
Patient Specific Counseling (Will Not Stick From Patient To Patient): - Advised that skin tags are benign growths. \\n- The most common methods for treatment are snip removal and cryosurgery.\\n- More will likely develop over time.\\n- Patient requested treatment today due to symptoms (See HPI).\\n- Recommended treatment with snip removal.\\n- Patient expressed understanding.

## 2022-05-26 RX ORDER — LOSARTAN POTASSIUM 100 MG/1
100 TABLET ORAL EVERY MORNING
Qty: 90 TABLET | Refills: 1 | Status: SHIPPED | OUTPATIENT
Start: 2022-05-26 | End: 2022-08-03 | Stop reason: ALTCHOICE

## 2022-06-08 ENCOUNTER — APPOINTMENT (OUTPATIENT)
Dept: URBAN - METROPOLITAN AREA CLINIC 256 | Age: 67
Setting detail: DERMATOLOGY
End: 2022-06-08

## 2022-06-08 VITALS — HEIGHT: 66 IN | WEIGHT: 185 LBS

## 2022-06-08 DIAGNOSIS — Z09 ENCOUNTER FOR FOLLOW-UP EXAMINATION AFTER COMPLETED TREATMENT FOR CONDITIONS OTHER THAN MALIGNANT NEOPLASM: ICD-10-CM

## 2022-06-08 DIAGNOSIS — L57.0 ACTINIC KERATOSIS: ICD-10-CM

## 2022-06-08 PROCEDURE — OTHER PATIENT SPECIFIC COUNSELING: OTHER

## 2022-06-08 PROCEDURE — OTHER COUNSELING: OTHER

## 2022-06-08 PROCEDURE — OTHER ADDITIONAL NOTES: OTHER

## 2022-06-08 PROCEDURE — 99213 OFFICE O/P EST LOW 20 MIN: CPT

## 2022-06-08 ASSESSMENT — LOCATION DETAILED DESCRIPTION DERM
LOCATION DETAILED: LEFT MEDIAL MALAR CHEEK
LOCATION DETAILED: LEFT SUPERIOR MEDIAL FOREHEAD
LOCATION DETAILED: NASAL DORSUM
LOCATION DETAILED: RIGHT MEDIAL MALAR CHEEK

## 2022-06-08 ASSESSMENT — LOCATION SIMPLE DESCRIPTION DERM
LOCATION SIMPLE: LEFT CHEEK
LOCATION SIMPLE: LEFT FOREHEAD
LOCATION SIMPLE: RIGHT CHEEK
LOCATION SIMPLE: NOSE

## 2022-06-08 ASSESSMENT — LOCATION ZONE DERM
LOCATION ZONE: NOSE
LOCATION ZONE: FACE

## 2022-06-08 NOTE — HPI: MEDICATION (5-FLUOROURACIL)
Is This A New Presentation, Or A Follow-Up?: Follow Up 5-Fluorouracil Therapy
Additional History: He used it for the most part once a day. It did hurt and burn. He wasn’t great about using it. He is out in the sun a lot. He started using about 2-3 days after her was seen. He thinks he didn’t miss any days.

## 2022-06-08 NOTE — PROCEDURE: ADDITIONAL NOTES
Detail Level: Simple
Render Risk Assessment In Note?: no
Additional Notes: Recommended using more of the 5-FU on the nose and left cheek for twice a day for one more week.
Additional Notes: Recommended FBE \\n\\nA clinical assistant was present for the exam. Care instructions were explained to the patient in detail. Told patient to call with any concerns or questions. The patient verbalized understanding and agreement of the education provided and the treatment plan. Encouraged patient to schedule a follow up appointment right after visit. At the end of the visit, all questions had been answered and the patient was satisfied with the visit.

## 2022-07-05 ENCOUNTER — OFFICE VISIT (OUTPATIENT)
Dept: INTERNAL MEDICINE | Facility: CLINIC | Age: 67
End: 2022-07-05
Payer: COMMERCIAL

## 2022-07-05 VITALS
HEART RATE: 65 BPM | DIASTOLIC BLOOD PRESSURE: 80 MMHG | HEIGHT: 66 IN | TEMPERATURE: 97 F | OXYGEN SATURATION: 96 % | WEIGHT: 188.9 LBS | BODY MASS INDEX: 30.36 KG/M2 | SYSTOLIC BLOOD PRESSURE: 150 MMHG

## 2022-07-05 DIAGNOSIS — Z13.6 CARDIOVASCULAR SCREENING; LDL GOAL LESS THAN 130: ICD-10-CM

## 2022-07-05 DIAGNOSIS — R05.9 COUGH: ICD-10-CM

## 2022-07-05 DIAGNOSIS — C61 PROSTATE CANCER (H): ICD-10-CM

## 2022-07-05 DIAGNOSIS — I10 ESSENTIAL HYPERTENSION, BENIGN: Primary | ICD-10-CM

## 2022-07-05 DIAGNOSIS — Z12.5 SCREENING FOR PROSTATE CANCER: ICD-10-CM

## 2022-07-05 PROCEDURE — 99214 OFFICE O/P EST MOD 30 MIN: CPT | Performed by: INTERNAL MEDICINE

## 2022-07-05 RX ORDER — ALBUTEROL SULFATE 90 UG/1
2 AEROSOL, METERED RESPIRATORY (INHALATION) EVERY 6 HOURS
Qty: 18 G | Refills: 5 | Status: SHIPPED | OUTPATIENT
Start: 2022-07-05 | End: 2024-08-15

## 2022-07-05 RX ORDER — OLMESARTAN MEDOXOMIL 40 MG/1
40 TABLET ORAL DAILY
Qty: 90 TABLET | Refills: 1 | Status: SHIPPED | OUTPATIENT
Start: 2022-07-05 | End: 2022-09-29

## 2022-07-05 NOTE — PATIENT INSTRUCTIONS
" PSA levels are slowly rising.  This may indicate residual cancer was not removed in general surgery in 2013.   I recommend that you follow-up with urology clinic to further evaluate.    --[facility] -  Multiple locations ([place], [place], etc.) main scheduling number [phone]        Blood pressure not as well controlled on plain Losartan 100.      We had to remove the HCTZ diuretic portio of your former medication due to adverse effects on the blood chemistries.      Stop Losartan.     Change to another more potent version off Losartan:     --Olmesartan 40 mg   OR   --Valsartan 320 mg   OR   --Irbesartan 300 mg      Return for a \"lab only appointment\" in approximately one month to recheck labs (nonfasting).  You can make this \"lab only appointment\" at any [facility] lab site, contact that clinic site directly to arrange this.  I will make contact either via phone or MyChart regarding the results and any recommendations once I have reviewed the lab results.      *  Use the Albuterol inhaler as needed for any coughing, wheezing, tightness in the breathing, or shortness of breath.   Consider using it before any known triggers for our coughing or wheezing (usually these include cold or hot temperatures, humidity, dust, air pollutants, smoke).  Expect that your airways may remain more easily irritated for a few weeks after upper respiratory infections.     If you require the albuterol rescue inhaler on a regular basis more than a few times per week, you may need additional medications to help control the breathing better.                  "

## 2022-07-05 NOTE — PROGRESS NOTES
"  Assessment & Plan   Problem List Items Addressed This Visit     Essential hypertension, benign - Primary    Relevant Medications    olmesartan (BENICAR) 40 MG tablet    Other Relevant Orders    REVIEW OF HEALTH MAINTENANCE PROTOCOL ORDERS (Completed)    Hemoglobin    Basic metabolic panel    Prostate cancer (H)    Relevant Orders    PSA, screen      Other Visit Diagnoses     Cough        Relevant Medications    albuterol (PROAIR HFA/PROVENTIL HFA/VENTOLIN HFA) 108 (90 Base) MCG/ACT inhaler    Screening for prostate cancer        Relevant Orders    REVIEW OF HEALTH MAINTENANCE PROTOCOL ORDERS (Completed)    PSA, screen    CARDIOVASCULAR SCREENING; LDL GOAL LESS THAN 130        Relevant Orders    REVIEW OF HEALTH MAINTENANCE PROTOCOL ORDERS (Completed)    Hemoglobin    Basic metabolic panel              PSA levels are slowly rising.  This may indicate residual cancer was not removed in general surgery in 2013.   I recommend that you follow-up with urology clinic to further evaluate.    --[facility] -  Multiple locations ([place], [place], etc.) main scheduling number [phone]          Blood pressure not as well controlled on plain Losartan 100.        We had to remove the HCTZ diuretic portio of your former medication due to adverse effects on the blood chemistries.        Stop Losartan.       Change to another more potent version off Losartan:     --Olmesartan 40 mg   OR   --Valsartan 320 mg   OR   --Irbesartan 300 mg        Return for a \"lab only appointment\" in approximately one month to recheck labs (nonfasting).  You can make this \"lab only appointment\" at any [facility] lab site, contact that clinic site directly to arrange this.  I will make contact either via phone or MyChart regarding the results and any recommendations once I have reviewed the lab results.      *  Use the Albuterol inhaler as needed for any coughing, wheezing, tightness in the breathing, or shortness of breath.  " " Consider using it before any known triggers for our coughing or wheezing (usually these include cold or hot temperatures, humidity, dust, air pollutants, smoke).  Expect that your airways may remain more easily irritated for a few weeks after upper respiratory infections.     If you require the albuterol rescue inhaler on a regular basis more than a few times per week, you may need additional medications to help control the breathing better.                         BMI:   Estimated body mass index is 30.49 kg/m  as calculated from the following:    Height as of this encounter: 1.676 m (5' 6\").    Weight as of this encounter: 85.7 kg (188 lb 14.4 oz).           Return in about 4 weeks (around 8/2/2022) for non-fasting lab only visit.    Wili Arevalo MD  [facility]    Jairo Bauer is a 67 year old, presenting for the following health issues:  Hypertension      History of Present Illness       Hypertension: He presents for follow up of hypertension.  He does not check blood pressure  regularly outside of the clinic. Outpatient blood pressures have not been over 140/90. He does not follow a low salt diet.         1.    Hypertension:  History of hypertension, on medication.  No reported side effects from medications.    Reviewed last 6 BP readings in chart:  BP Readings from Last 6 Encounters:   07/05/22 (!) 150/80   04/18/22 (!) 148/80   11/18/21 128/76   11/20/20 132/76   08/29/19 115/60   07/28/19 124/76     No active cardiac complaints or symptoms with exercise.       2.  History of chronic cough, typically worse in summer months.  Use albuterol occasion, needs refill.      3.  History of prostate cancer status post prostatectomy 2013   He is monitoring PSA levels through our office, no longer seeing urology.  Reviewed most recent labs:    Lab Results   Component Value Date    PSA 1.30 11/18/2021    PSA 1.08 11/20/2020    PSA 0.92 12/28/2018    PSA 0.52 02/26/2018    " "PSA 0.45 02/24/2017    PSA 0.37 02/23/2016    PSA 0.17 02/23/2015    PSA <0.10 06/26/2014    PSA <0.04 09/26/2013    PSA 19.50 04/25/2011    PSA 19.50 04/25/2011    PSA 10.30 09/29/2008    PSA 10.70 07/30/2007          **I reviewed the information recorded in the patient's EPIC chart (including but not limited to medical history, surgical history, family history, problem list, medication list, and allergy list) and updated the information as indicated based on the patients reported information.         Review of Systems   Constitutional, HEENT, cardiovascular, pulmonary, gi and gu systems are negative, except as otherwise noted.      Objective    BP (!) 150/80   Pulse 65   Temp 97  F (36.1  C) (Temporal)   Ht 1.676 m (5' 6\")   Wt 85.7 kg (188 lb 14.4 oz)   SpO2 96%   BMI 30.49 kg/m    Body mass index is 30.49 kg/m .  Physical Exam   GENERAL alert and no distress  EYES:  Normal sclera,conjunctiva, EOMI  HENT: oral and posterior pharynx without lesions or erythema, facies symmetric  NECK: Neck supple. No LAD, without thyroidmegaly.  RESP: Clear to ausculation bilaterally without wheezes or crackles. Normal BS in all fields.  CV: RRR normal S1S2 without murmurs, rubs or gallops.  LYMPH: no cervical lymph adenopathy appreciated  MS: extremities- no gross deformities of the visible extremities noted,   EXT:  no lower extremity edema  PSYCH: Alert and oriented times 3; speech- coherent  SKIN:  No obvious significant skin lesions on visible portions of face                     .  ..  "

## 2022-08-02 ENCOUNTER — LAB (OUTPATIENT)
Dept: LAB | Facility: CLINIC | Age: 67
End: 2022-08-02
Payer: COMMERCIAL

## 2022-08-02 DIAGNOSIS — Z13.6 CARDIOVASCULAR SCREENING; LDL GOAL LESS THAN 130: ICD-10-CM

## 2022-08-02 DIAGNOSIS — Z12.5 SCREENING FOR PROSTATE CANCER: ICD-10-CM

## 2022-08-02 DIAGNOSIS — C61 PROSTATE CANCER (H): ICD-10-CM

## 2022-08-02 DIAGNOSIS — I10 ESSENTIAL HYPERTENSION, BENIGN: ICD-10-CM

## 2022-08-02 LAB
ANION GAP SERPL CALCULATED.3IONS-SCNC: 11 MMOL/L (ref 3–14)
BUN SERPL-MCNC: 19 MG/DL (ref 7–30)
CALCIUM SERPL-MCNC: 9.9 MG/DL (ref 8.5–10.1)
CHLORIDE BLD-SCNC: 100 MMOL/L (ref 94–109)
CO2 SERPL-SCNC: 22 MMOL/L (ref 20–32)
CREAT SERPL-MCNC: 1.28 MG/DL (ref 0.66–1.25)
GFR SERPL CREATININE-BSD FRML MDRD: 61 ML/MIN/1.73M2
GLUCOSE BLD-MCNC: 94 MG/DL (ref 70–99)
HGB BLD-MCNC: 15.4 G/DL (ref 13.3–17.7)
POTASSIUM BLD-SCNC: 4.5 MMOL/L (ref 3.4–5.3)
PSA SERPL-MCNC: 1.56 UG/L (ref 0–4)
SODIUM SERPL-SCNC: 133 MMOL/L (ref 133–144)

## 2022-08-02 PROCEDURE — 80048 BASIC METABOLIC PNL TOTAL CA: CPT

## 2022-08-02 PROCEDURE — G0103 PSA SCREENING: HCPCS

## 2022-08-02 PROCEDURE — 36415 COLL VENOUS BLD VENIPUNCTURE: CPT

## 2022-08-02 PROCEDURE — 85018 HEMOGLOBIN: CPT

## 2022-08-11 ENCOUNTER — OFFICE VISIT (OUTPATIENT)
Dept: URGENT CARE | Facility: URGENT CARE | Age: 67
End: 2022-08-11
Payer: COMMERCIAL

## 2022-08-11 VITALS
OXYGEN SATURATION: 96 % | BODY MASS INDEX: 30.34 KG/M2 | HEART RATE: 80 BPM | WEIGHT: 188 LBS | SYSTOLIC BLOOD PRESSURE: 130 MMHG | DIASTOLIC BLOOD PRESSURE: 58 MMHG | RESPIRATION RATE: 18 BRPM

## 2022-08-11 DIAGNOSIS — S61.412A LACERATION OF LEFT HAND WITHOUT FOREIGN BODY, INITIAL ENCOUNTER: Primary | ICD-10-CM

## 2022-08-11 PROCEDURE — 12002 RPR S/N/AX/GEN/TRNK2.6-7.5CM: CPT | Performed by: PHYSICIAN ASSISTANT

## 2022-08-11 NOTE — PROGRESS NOTES
SUBJECTIVE:     Chief Complaint   Patient presents with     Laceration     Pt cut L hand knuckle on a sheet of metal      Juancarlos Ma is a 67 year old male who presents to the clinic with a laceration on the left knuckle sustained 1 hour(s) ago.  This is a accidental injury.    Mechanism of injury: belt .    Associated symptoms: Denies numbness, weakness, or loss of function  Last tetanus booster within 10 years: yes    EXAM:   The patient appears today in alert,no apparent distress distress  VITALS: /58   Pulse 80   Resp 18   Wt 85.3 kg (188 lb)   SpO2 96%   BMI 30.34 kg/m      Size of laceration: 3 centimeters  Characteristics of the laceration: bleeding- mild, clean, straight and superficial  Tendon function intact: yes  Sensation to light touch intact: yes  Pulses intact: yes  Picture included in patient's chart: no    Assessment:  (S61.412A) Laceration of left hand without foreign body, initial encounter  (primary encounter diagnosis)  Plan: REPAIR SUPERFICIAL, WOUND BODY 2.6-7.5 CM    PLAN:  PROCEDURE NOTE::  Wound was locally injected with 4 cc's of Lidocaine 2% plain  Good anesthesia was obtained  Wound cleaned with betadine/saline solution  Wound cleaned with saline  Wound soaked  Wound irrigated  Laceration was closed using 4 4-0 ethilon interrupted sutures  After care instructions:  Keep wound clean and dry for the next 24-48 hours  Sutures out in 10 days  Signs of infection discussed today  Apply anti-bacterial ointment for 10 days  May return to work as long as wound is kept clean and dry  Discussed the probability of scarring

## 2022-08-22 ENCOUNTER — OFFICE VISIT (OUTPATIENT)
Dept: URGENT CARE | Facility: URGENT CARE | Age: 67
End: 2022-08-22
Payer: COMMERCIAL

## 2022-08-22 DIAGNOSIS — S61.412A LACERATION OF LEFT HAND WITHOUT FOREIGN BODY, INITIAL ENCOUNTER: Primary | ICD-10-CM

## 2022-08-22 PROCEDURE — 99024 POSTOP FOLLOW-UP VISIT: CPT

## 2022-08-22 NOTE — PROGRESS NOTES
Sutures not ready for removal with 10 days on extensor joint.  1 suture already came out and there is mild dehiscence of wound.  Return in 1 week    Brett Anaya [facility] PADelbertC

## 2022-08-29 ENCOUNTER — OFFICE VISIT (OUTPATIENT)
Dept: URGENT CARE | Facility: URGENT CARE | Age: 67
End: 2022-08-29
Payer: COMMERCIAL

## 2022-08-29 VITALS — TEMPERATURE: 97.9 F

## 2022-08-29 DIAGNOSIS — S61.412A LACERATION OF LEFT HAND WITHOUT FOREIGN BODY, INITIAL ENCOUNTER: Primary | ICD-10-CM

## 2022-08-29 PROCEDURE — 99207 PR NO CHARGE NURSE ONLY: CPT

## 2022-09-27 DIAGNOSIS — I10 ESSENTIAL HYPERTENSION, BENIGN: ICD-10-CM

## 2022-09-27 NOTE — TELEPHONE ENCOUNTER
Reason for Call:  Other prescription    Detailed comments:  Patient needs a refill on medication. When the patient went to  his medication there was only a 90 day refill.    Phone Number Patient can be reached at: [phone]    Best Time: anytime    Can we leave a detailed message on this number? yes    Call taken on 9/27/2022 at 12:31 PM by Shirley Genao MA

## 2022-09-29 RX ORDER — OLMESARTAN MEDOXOMIL 40 MG/1
40 TABLET ORAL DAILY
Qty: 90 TABLET | Refills: 1 | Status: SHIPPED | OUTPATIENT
Start: 2022-09-29 | End: 2023-06-12

## 2022-09-29 NOTE — TELEPHONE ENCOUNTER
Routing refill request to provider for review/approval because:  Labs out of range:    Creatinine   Date Value Ref Range Status   08/02/2022 1.28 (H) 0.66 - 1.25 mg/dL Final   11/20/2020 1.09 0.66 - 1.25 mg/dL Final     Marilynn Patterson RN

## 2023-06-12 DIAGNOSIS — I10 ESSENTIAL HYPERTENSION, BENIGN: ICD-10-CM

## 2023-06-12 RX ORDER — OLMESARTAN MEDOXOMIL 40 MG/1
40 TABLET ORAL DAILY
Qty: 90 TABLET | Refills: 0 | Status: SHIPPED | OUTPATIENT
Start: 2023-06-12 | End: 2023-08-09

## 2023-06-17 ENCOUNTER — HEALTH MAINTENANCE LETTER (OUTPATIENT)
Age: 68
End: 2023-06-17

## 2023-08-07 ENCOUNTER — APPOINTMENT (OUTPATIENT)
Dept: URBAN - METROPOLITAN AREA CLINIC 256 | Age: 68
Setting detail: DERMATOLOGY
End: 2023-08-07

## 2023-08-07 VITALS — HEIGHT: 66 IN | WEIGHT: 180 LBS

## 2023-08-07 DIAGNOSIS — L82.1 OTHER SEBORRHEIC KERATOSIS: ICD-10-CM

## 2023-08-07 DIAGNOSIS — L57.0 ACTINIC KERATOSIS: ICD-10-CM

## 2023-08-07 DIAGNOSIS — D18.0 HEMANGIOMA: ICD-10-CM

## 2023-08-07 DIAGNOSIS — L57.8 OTHER SKIN CHANGES DUE TO CHRONIC EXPOSURE TO NONIONIZING RADIATION: ICD-10-CM

## 2023-08-07 DIAGNOSIS — L82.0 INFLAMED SEBORRHEIC KERATOSIS: ICD-10-CM

## 2023-08-07 PROBLEM — Z85.46 PERSONAL HISTORY OF MALIGNANT NEOPLASM OF PROSTATE: Status: ACTIVE | Noted: 2023-08-07

## 2023-08-07 PROBLEM — D18.01 HEMANGIOMA OF SKIN AND SUBCUTANEOUS TISSUE: Status: ACTIVE | Noted: 2023-08-07

## 2023-08-07 PROCEDURE — OTHER LIQUID NITROGEN: OTHER

## 2023-08-07 PROCEDURE — OTHER MIPS QUALITY: OTHER

## 2023-08-07 PROCEDURE — 17000 DESTRUCT PREMALG LESION: CPT | Mod: 59

## 2023-08-07 PROCEDURE — 99213 OFFICE O/P EST LOW 20 MIN: CPT | Mod: 25

## 2023-08-07 PROCEDURE — 17110 DESTRUCT B9 LESION 1-14: CPT

## 2023-08-07 PROCEDURE — OTHER COUNSELING: OTHER

## 2023-08-07 ASSESSMENT — LOCATION SIMPLE DESCRIPTION DERM
LOCATION SIMPLE: UPPER BACK
LOCATION SIMPLE: NOSE
LOCATION SIMPLE: RIGHT CHEEK
LOCATION SIMPLE: RIGHT SHOULDER
LOCATION SIMPLE: LEFT UPPER BACK

## 2023-08-07 ASSESSMENT — LOCATION ZONE DERM
LOCATION ZONE: FACE
LOCATION ZONE: TRUNK
LOCATION ZONE: NOSE
LOCATION ZONE: ARM

## 2023-08-07 ASSESSMENT — LOCATION DETAILED DESCRIPTION DERM
LOCATION DETAILED: SUPERIOR THORACIC SPINE
LOCATION DETAILED: RIGHT CENTRAL MALAR CHEEK
LOCATION DETAILED: LEFT SUPERIOR MEDIAL UPPER BACK
LOCATION DETAILED: RIGHT POSTERIOR SHOULDER
LOCATION DETAILED: NASAL DORSUM

## 2023-08-07 NOTE — PROCEDURE: LIQUID NITROGEN
Show Topical Anesthesia Variable?: Yes
Medical Necessity Information: It is in your best interest to select a reason for this procedure from the list below. All of these items fulfill various CMS LCD requirements except the new and changing color options.
Post-Care Instructions: I reviewed with the patient in detail post-care instructions. Patient is to wear sunprotection, and avoid picking at any of the treated lesions. Pt may apply Vaseline to crusted or scabbing areas.
Detail Level: Detailed
Spray Paint Text: The liquid nitrogen was applied to the skin utilizing a spray paint frosting technique.
Render Note In Bullet Format When Appropriate: No
Medical Necessity Clause: This procedure was medically necessary because the lesions that were treated were:
Consent: The patient's consent was obtained including but not limited to risks of crusting, scabbing, blistering, scarring, darker or lighter pigmentary change, recurrence, incomplete removal and infection.
Number Of Freeze-Thaw Cycles: 1 freeze-thaw cycle
Duration Of Freeze Thaw-Cycle (Seconds): 6

## 2023-08-09 ENCOUNTER — OFFICE VISIT (OUTPATIENT)
Dept: INTERNAL MEDICINE | Facility: CLINIC | Age: 68
End: 2023-08-09
Payer: COMMERCIAL

## 2023-08-09 VITALS
BODY MASS INDEX: 30.7 KG/M2 | OXYGEN SATURATION: 96 % | HEART RATE: 120 BPM | DIASTOLIC BLOOD PRESSURE: 60 MMHG | TEMPERATURE: 98.2 F | HEIGHT: 66 IN | WEIGHT: 191 LBS | SYSTOLIC BLOOD PRESSURE: 110 MMHG

## 2023-08-09 DIAGNOSIS — J44.1 COPD EXACERBATION (H): ICD-10-CM

## 2023-08-09 DIAGNOSIS — I48.91 NEW ONSET ATRIAL FIBRILLATION (H): ICD-10-CM

## 2023-08-09 DIAGNOSIS — R00.0 TACHYCARDIA: ICD-10-CM

## 2023-08-09 DIAGNOSIS — Z00.00 ENCOUNTER FOR MEDICARE ANNUAL WELLNESS EXAM: Primary | ICD-10-CM

## 2023-08-09 DIAGNOSIS — C61 PROSTATE CANCER (H): ICD-10-CM

## 2023-08-09 DIAGNOSIS — I10 ESSENTIAL HYPERTENSION, BENIGN: ICD-10-CM

## 2023-08-09 LAB
ALT SERPL W P-5'-P-CCNC: 17 U/L (ref 0–70)
ANION GAP SERPL CALCULATED.3IONS-SCNC: 12 MMOL/L (ref 7–15)
AST SERPL W P-5'-P-CCNC: 24 U/L (ref 0–45)
BUN SERPL-MCNC: 20.9 MG/DL (ref 8–23)
CALCIUM SERPL-MCNC: 10.1 MG/DL (ref 8.8–10.2)
CHLORIDE SERPL-SCNC: 100 MMOL/L (ref 98–107)
CREAT SERPL-MCNC: 1.27 MG/DL (ref 0.67–1.17)
DEPRECATED HCO3 PLAS-SCNC: 28 MMOL/L (ref 22–29)
ERYTHROCYTE [DISTWIDTH] IN BLOOD BY AUTOMATED COUNT: 12.9 % (ref 10–15)
GFR SERPL CREATININE-BSD FRML MDRD: 61 ML/MIN/1.73M2
GLUCOSE SERPL-MCNC: 92 MG/DL (ref 70–99)
HCT VFR BLD AUTO: 44.5 % (ref 40–53)
HGB BLD-MCNC: 15.1 G/DL (ref 13.3–17.7)
MCH RBC QN AUTO: 32.5 PG (ref 26.5–33)
MCHC RBC AUTO-ENTMCNC: 33.9 G/DL (ref 31.5–36.5)
MCV RBC AUTO: 96 FL (ref 78–100)
PLATELET # BLD AUTO: 248 10E3/UL (ref 150–450)
POTASSIUM SERPL-SCNC: 4.8 MMOL/L (ref 3.4–5.3)
PSA SERPL DL<=0.01 NG/ML-MCNC: 1.59 NG/ML (ref 0–4.5)
RBC # BLD AUTO: 4.65 10E6/UL (ref 4.4–5.9)
SODIUM SERPL-SCNC: 140 MMOL/L (ref 136–145)
T4 FREE SERPL-MCNC: 1.06 NG/DL (ref 0.9–1.7)
TSH SERPL DL<=0.005 MIU/L-ACNC: 5.16 UIU/ML (ref 0.3–4.2)
WBC # BLD AUTO: 7.4 10E3/UL (ref 4–11)

## 2023-08-09 PROCEDURE — 84439 ASSAY OF FREE THYROXINE: CPT | Performed by: INTERNAL MEDICINE

## 2023-08-09 PROCEDURE — 85027 COMPLETE CBC AUTOMATED: CPT | Performed by: INTERNAL MEDICINE

## 2023-08-09 PROCEDURE — 80048 BASIC METABOLIC PNL TOTAL CA: CPT | Performed by: INTERNAL MEDICINE

## 2023-08-09 PROCEDURE — 99214 OFFICE O/P EST MOD 30 MIN: CPT | Mod: 25 | Performed by: INTERNAL MEDICINE

## 2023-08-09 PROCEDURE — 84450 TRANSFERASE (AST) (SGOT): CPT | Performed by: INTERNAL MEDICINE

## 2023-08-09 PROCEDURE — 84153 ASSAY OF PSA TOTAL: CPT | Performed by: INTERNAL MEDICINE

## 2023-08-09 PROCEDURE — 84443 ASSAY THYROID STIM HORMONE: CPT | Performed by: INTERNAL MEDICINE

## 2023-08-09 PROCEDURE — 93000 ELECTROCARDIOGRAM COMPLETE: CPT | Performed by: INTERNAL MEDICINE

## 2023-08-09 PROCEDURE — 36415 COLL VENOUS BLD VENIPUNCTURE: CPT | Performed by: INTERNAL MEDICINE

## 2023-08-09 PROCEDURE — 84460 ALANINE AMINO (ALT) (SGPT): CPT | Performed by: INTERNAL MEDICINE

## 2023-08-09 PROCEDURE — G0439 PPPS, SUBSEQ VISIT: HCPCS | Performed by: INTERNAL MEDICINE

## 2023-08-09 RX ORDER — OLMESARTAN MEDOXOMIL 20 MG/1
20 TABLET ORAL DAILY
Qty: 90 TABLET | Refills: 0 | Status: SHIPPED | OUTPATIENT
Start: 2023-08-09 | End: 2023-10-30

## 2023-08-09 RX ORDER — DILTIAZEM HYDROCHLORIDE 180 MG/1
180 CAPSULE, EXTENDED RELEASE ORAL DAILY
Qty: 90 CAPSULE | Refills: 0 | Status: SHIPPED | OUTPATIENT
Start: 2023-08-09 | End: 2023-10-30

## 2023-08-09 RX ORDER — ALBUTEROL SULFATE 90 UG/1
2 AEROSOL, METERED RESPIRATORY (INHALATION) EVERY 4 HOURS PRN
Qty: 18 G | Refills: 5 | Status: ON HOLD | OUTPATIENT
Start: 2023-08-09 | End: 2024-09-16

## 2023-08-09 ASSESSMENT — ENCOUNTER SYMPTOMS
DIZZINESS: 0
COUGH: 0
EYE PAIN: 0
FEVER: 0
DYSURIA: 0
WEAKNESS: 0
CHILLS: 0
HEADACHES: 0
ARTHRALGIAS: 0
PALPITATIONS: 0
PARESTHESIAS: 0
NAUSEA: 0
HEARTBURN: 0
HEMATURIA: 0
NERVOUS/ANXIOUS: 0
DIARRHEA: 0
ABDOMINAL PAIN: 0
JOINT SWELLING: 0
MYALGIAS: 0
FREQUENCY: 1
HEMATOCHEZIA: 0
CONSTIPATION: 0
SHORTNESS OF BREATH: 1

## 2023-08-09 ASSESSMENT — ACTIVITIES OF DAILY LIVING (ADL): CURRENT_FUNCTION: NO ASSISTANCE NEEDED

## 2023-08-09 NOTE — PROGRESS NOTES
"He is at risk for lack of exercise and has been provided with information to increase physical activity for the benefit of his well-being.  The patient reports that he drinks more than one alcoholic drink per day and sometimes engages in binge or excessive drinking. He was counseled and given information about possible harmful effects of excessive alcohol intake as well as where to get help for alcohol problems.  The patient was counseled and encouraged to consider modifying their diet and eating habits. He was provided with information on recommended healthy diet options.    Answers submitted by the patient for this visit:  Annual Preventive Visit (Submitted on 8/9/2023)  Chief Complaint: Annual Exam:  In general, how would you rate your overall physical health?: good  Frequency of exercise:: None  Do you usually eat at least 4 servings of fruit and vegetables a day, include whole grains & fiber, and avoid regularly eating high fat or \"junk\" foods? : No  Taking medications regularly:: Yes  Medication side effects:: None  Activities of Daily Living: no assistance needed  Home safety: no safety concerns identified  Hearing Impairment:: no hearing concerns  In the past 6 months, have you been bothered by leaking of urine?: No  abdominal pain: No  Blood in stool: No  Blood in urine: No  chest pain: No  chills: No  congestion: No  constipation: No  cough: No  diarrhea: No  dizziness: No  ear pain: No  eye pain: No  nervous/anxious: No  fever: No  frequency: Yes  genital sores: No  headaches: No  hearing loss: No  heartburn: No  arthralgias: No  joint swelling: No  peripheral edema: No  mood changes: No  myalgias: No  nausea: No  dysuria: No  palpitations: No  Skin sensation changes: No  urgency: Yes  rash: No  shortness of breath: Yes  visual disturbance: No  weakness: No  impotence: No  penile discharge: No  In general, how would you rate your overall mental or emotional health?: excellent  Additional concerns today:: " No

## 2023-08-09 NOTE — PATIENT INSTRUCTIONS
We are rechecking your labs.  I will let you know if the results indicate any changes in your therapy.  Unless you hear otherwise, continue all medications at the same doses.  Contact your usual pharmacy if you need refills.     Assuming your labs look good, then continue all medications at the same doses.  Contact your usual pharmacy if you need refills.       *  Use the Albuterol inhaler as needed for any coughing, wheezing, tightness in the breathing, or shortness of breath.   Consider using it before any known triggers for our coughing or wheezing (usually these include cold or hot temperatures, humidity, dust, air pollutants, smoke).  Expect that your airways may remain more easily irritated for a few weeks after upper respiratory infections.     If you require the albuterol rescue inhaler on a regular basis more than a few times per week, you may need additional medications to help control the breathing better.    These are the available forms of Albuterol, your insurance formulary will determine which one is preferred.  They all work the same.                  NEW ONSET ATRIAL FIBRILLATION:       New onset atrial fibrillation.  atrial fibrillation is very common heart arrhythmia creating an irregular and more erratic heart rhythm.  As many as 20% of people over age 70 may develop this condition, especially those with a history of hypertension.      Main management considerations for atrial fibrillation:    --Investigate and manage any reversible conditions causing the atrial fibrillation (like electrolyte abnormalities, thyroid problems, removing excessive caffeine, removing alcohol use, etc.)  In your case:  there are no obvious reversible causes.    --Rate control.  In your case: your rate is not controlled    --Consider anticoagulation if your risk from stroke from atrial fibrillation is high enough.   In your case, your risk for stroke is high enough to warrant anticoagulation     Start Diltiazem 180  mg once per day, this will help control heart rate and prevent faster heart rhythm.      Reduce the dose of the Olmesartan to 20 mg once per day because the Diltiazem can also lower blood pressure.     Main side effects from any anticoagulation include increased risk of bleeding and bruising.  Let us know if you experience any significant bleeding or bruising.      I strongly recommend that you consider anticoagulation medication to reduce the risk of stroke from atrial fibrillation.  Options include warfarin (very cheap, but requires regular labs draws to monitor, and food control to limit vegetables) or novel oral anticoagulants (NOACs), e.g Xarelto, Eliquis, Pradaxa that work very well with no labs required, but may be expensive.      If the prescription for Eliquis is too expensive, then do not take it and discuss with the Cardiologist.       You CANNOT take ANY aspirin (even the low dose aspirin) or NSAIDS (Advil, Motrin, Aleve, Ibuprofen, or prescription versions of these medications) while taking anticoagulation like this.       Echocardiogram ( heart ultrasound) to evaluate the structure of your heart and pumping performance.   You will be contacted to arrange this imaging study.     Cardiology referral to evaluate your results and make any other additional recommendations.  They will consider you for    [facility] will call you to coordinate your cardiology appointment. If you don't hear from a representative within 2 business days, please call [phone] or [facility] (locations [facility], [place]) phone [phone]  (Fax [phone])          5 GOALS TO PREVENT VASCULAR DISEASE:     1.  Aggressive blood pressure control, under 130/80 ideally.  Using medications if needed.    Your blood pressure is under good control    BP Readings from Last 4 Encounters:   08/09/23 110/60   08/11/22 130/58   07/05/22 (!) 150/80   04/18/22 (!) 148/80       2.  Aggressive LDL  "cholesterol (\"bad cholesterol\") lowering as indicated.    Your goal is an LDL under 130 for sure, preferably under 100.  (If you have diabetes or previous vascular disease, the the LDL goals would be under 100 for sure, preferably under 70.)    New guidelines identify four high-risk groups who could benefit from statins:   *people with pre-existing heart disease, such as those who have had a heart attack;   *people ages 40 to 75 who have diabetes of any type  *patients ages 40 to 75 with at least a 7.5% risk of developing cardiovascular disease over the next decade, according to a formula described in the guidelines  *patients with the sort of super-high cholesterol that sometimes runs in families, as evidenced by an LDL of 190 milligrams per deciliter or higher    Your cholesterol levels are well controlled.    Recent Labs   Lab Test 11/18/21  1044 11/20/20  1151   CHOL 191 207*   HDL 72 71   * 122*   TRIG 60 72       3.  Aggressive diabetic prevention, screening and/or management.      You do not have diabetes as of the most recent blood tests.     4.  No smoking    5.  Consider daily preventative aspirin over age 50 if you have enough cardiac risk factors to place you at higher risk for the presence of vascular disease.    If you have any reason not to take aspirin such easy bruising or bleeding, stomach problems, other anticoagulant medications, or any other side effects, then you should not take Aspirin.      --you should nto take aspirin daily when you are taking anti-coagulation medication.         Preventive Health Recommendations:   Male Ages 65 - 75    Yearly exam:             See your health care provider every year in order to  o   Review health changes.   o   Discuss preventive care.    o   Review your medicines if your doctor has prescribed any.  Talk with your health care provider about whether you should have a test to screen for prostate cancer (PSA).  Every 3 years, have a diabetes test " (fasting glucose). If you are at risk for diabetes, you should have this test more often.  At least Every 5 years, have a cholesterol test. Have this test more often if you have medical conditions that place you at higher risk for high cholesterol or heart disease.   Regular colon cancer screening starting age 45 with either a colonoscopy, or stool test (either Cologuard every 3 years or yearly FIT stool test from ).  The intervals for colon cancer screening will be determined by family history and prior colon cancer screening results.   Routine prostate cancer screening with a PSA blood test every 1-2 years.   While the PSA blood test is not a direct cancer screening blood test, it detects inflammation within in the prostate, which could indicate possible cancer.  If the test is abnormal, you do not necessarily have prostate cancer, but would need further evaluation.    Talk to with your health care provider about screening for Abdominal Aortic Aneurysm if you have a family history of AAA or have a history of smoking.    Shots:   Get a flu shot each year.   Covid vaccines are now recommended annually.  Get the most updated Covid vaccine when it becomes available, consider getting this at the same time as the annual influenza vaccine.   Get a tetanus shot every 10 years.  Everyone over 65 should make sure to receive pneumonia vaccines to prevent the most common type of bacterial pneumonia: Pneumococcal pneumonia. There are now two you should receive - Pneumovax (PPSV 23) and Prevnar (PCV 20)  Strongly consider the Shingrix shingles vaccine to give you the best chance of avoiding future shingles infection (as many as 1 and 3 adults over age 50 may develop this condition in their lifetime).    If you have Medicare insurance, investigate the cost and coverage for Shingrix shingles vaccines with a pharmacist at a pharmacy.  They can tell you the coverage and cost and then give it to you if the price is acceptable.   "  Medicare sometimes does not cover these Shingrix shingles vaccines, and with Medicare insurance it is usually cheaper to receive this shingles vaccine from a pharmacist in a pharmacy rather than in our clinic.    At this time, you only need the 2 Shingrix vaccines and then you are done.     Nutrition:   Eat at least 5 servings of fruits and vegetables each day.   Eat whole-grain bread, whole-wheat pasta and brown rice instead of white grains and rice.   Talk to your provider about Calcium and Vitamin D.      --Good Grains:  Oats, brown rice, Quinoa (these do not raise the blood sugar as much)     --Bad grains:  Anything made from wheat or white rice     (because these raise the blood sugars significantly, and the possible gluten issue from wheat for some people).      --Proteins:  Aim for \"lean proteins\" including chicken, fish, seafood, pork, turkey, and eggs (in moderation); Eat red meat only occasionally    Lifestyle  Exercise for at least 150 minutes a week (30 minutes a day, 5 days a week). This will help you control your weight and prevent disease.   Limit alcohol to one drink per day.   No smoking.   Wear sunscreen to prevent skin cancer.   See your dentist every six months for an exam and cleaning.   See your eye doctor every 1 to 2 years to screen for conditions such as glaucoma, macular degeneration, cataracts, etc          Patient Education   Personalized Prevention Plan  You are due for the preventive services outlined below.  Your care team is available to assist you in scheduling these services.  If you have already completed any of these items, please share that information with your care team to update in your medical record.  Health Maintenance Due   Topic Date Due     Zoster (Shingles) Vaccine (1 of 2) Never done     LUNG CANCER SCREENING  Never done     COVID-19 Vaccine (3 - Pfizer series) 07/18/2021     Pneumococcal Vaccine (2 - PCV) 11/20/2021     ANNUAL REVIEW OF HM ORDERS  07/05/2023 " "    Learning About Being Physically Active  What is physical activity?     Being physically active means doing any kind of activity that gets your body moving.  The types of physical activity that can help you get fit and stay healthy include:  Aerobic or \"cardio\" activities. These make your heart beat faster and make you breathe harder, such as brisk walking, riding a bike, or running. They strengthen your heart and lungs and build up your endurance.  Strength training activities. These make your muscles work against, or \"resist,\" something. Examples include lifting weights or doing push-ups. These activities help tone and strengthen your muscles and bones.  Stretches. These let you move your joints and muscles through their full range of motion. Stretching helps you be more flexible.  Reaching a balance between these three types of physical activity is important because each one contributes to your overall fitness.  What are the benefits of being active?  Being active is one of the best things you can do for your health. It helps you to:  Feel stronger and have more energy to do all the things you like to do.  Focus better at school or work.  Feel, think, and sleep better.  Reach and stay at a healthy weight.  Lose fat and build lean muscle.  Lower your risk for serious health problems, including diabetes, heart attack, high blood pressure, and some cancers.  Keep your heart, lungs, bones, muscles, and joints strong and healthy.  How can you make being active part of your life?  Start slowly. Make it your long-term goal to get at least 30 minutes of exercise on most days of the week. Walking is a good choice. You also may want to do other activities, such as running, swimming, cycling, or playing tennis or team sports.  Pick activities that you like--ones that make your heart beat faster, your muscles stronger, and your muscles and joints more flexible. If you find more than one thing you like doing, do them all. " "You don't have to do the same thing every day.  Get your heart pumping every day. Any activity that makes your heart beat faster and keeps it at that rate for a while counts.  Here are some great ways to get your heart beating faster:  Go for a brisk walk, run, or bike ride.  Go for a hike or swim.  Go in-line skating.  Play a game of touch football, basketball, or soccer.  Ride a bike.  Play tennis or racquetball.  Climb stairs.  Even some household chores can be aerobic--just do them at a faster pace. Vacuuming, raking or mowing the lawn, sweeping the garage, and washing and waxing the car all can help get your heart rate up.  Strengthen your muscles during the week. You don't have to lift heavy weights or grow big, bulky muscles to get stronger. Doing a few simple activities that make your muscles work against, or \"resist,\" something can help you get stronger.  For example, you can:  Do push-ups or sit-ups, which use your own body weight as resistance.  Lift weights or dumbbells or use stretch bands at home or in a gym or community center.  Stretch your muscles often. Stretching will help you as you become more active. It can help you stay flexible, loosen tight muscles, and avoid injury. It can also help improve your balance and posture and can be a great way to relax.  Be sure to stretch the muscles you'll be using when you work out. It's best to warm your muscles slightly before you stretch them. Walk or do some other light aerobic activity for a few minutes, and then start stretching.  When you stretch your muscles:  Do it slowly. Stretching is not about going fast or making sudden movements.  Don't push or bounce during a stretch.  Hold each stretch for at least 15 to 30 seconds, if you can. You should feel a stretch in the muscle, but not pain.  Breathe out as you do the stretch. Then breathe in as you hold the stretch. Don't hold your breath.  If you're worried about how more activity might affect your " "health, have a checkup before you start. Follow any special advice your doctor gives you for getting a smart start.  Where can you learn more?  Go to https://www.[employer].net/patiented  Enter W332 in the search box to learn more about \"Learning About Being Physically Active.\"  Current as of: October 10, 2022               Content Version: 13.7    6045-9645 [employer].   Care instructions adapted under license by your healthcare professional. If you have questions about a medical condition or this instruction, always ask your healthcare professional. [employer] disclaims any warranty or liability for your use of this information.       Patient Education   Alcohol Use     Many people can enjoy a glass of wine or beer without any negative consequences to their health. According to the Centers for Disease Control and Prevention (CDC), having one or fewer drinks per day for women and two or fewer per day for men is considered moderate drinking.     When people drink more than moderately, it can become concerning. Excessive drinking is defined as consuming 15 drinks or more per week for men and 8 drinks or more per week for women. There are various health problems associated with excessive drinking, which include:  Damage to vital organs like the heart, brain, liver and pancreas  Harm to the digestive tract  Weaken the immune system  Higher risk for heart disease and cancer    There are many resources available to people who are addicted to alcohol. A counselor or other health care provider can give you support. So can a , , or rabbi who is trained in substance abuse counseling. Friends and family may also help once you are connected with professionals.           Learning About Dietary Guidelines  What are the Dietary Guidelines for Americans?     Dietary Guidelines for Americans provide tips for eating well and staying healthy. This helps reduce the risk for long-term (chronic) " diseases.  These guidelines recommend that you:  Eat and drink the right amount for you. The [place] government's food guide is called MyPlate. It can help you make your own well-balanced eating plan.  Try to balance your eating with your activity. This helps you stay at a healthy weight.  Drink alcohol in moderation, if at all.  Limit foods high in salt, saturated fat, trans fat, and added sugar.  These guidelines are from the [place] Department of Agriculture and the [place] Department of Health and Human Services. They are updated every 5 years.  What is MyPlate?  MyPlate is the [place] government's food guide. It can help you make your own well-balanced eating plan. A balanced eating plan means that you eat enough, but not too much, and that your food gives you the nutrients you need to stay healthy.  MyPlate focuses on eating plenty of whole grains, fruits, and vegetables, and on limiting fat and sugar. It is available online at www.ChooseMyPlate.gov.  How can you get started?  If you're trying to eat healthier, you can slowly change your eating habits over time. You don't have to make big changes all at once. Start by adding one or two healthy foods to your meals each day.  Grains  Choose whole-grain breads and cereals and whole-wheat pasta and whole-grain crackers.  Vegetables  Eat a variety of vegetables every day. They have lots of nutrients and are part of a heart-healthy diet.  Fruits  Eat a variety of fruits every day. Fruits contain lots of nutrients. Choose fresh fruit instead of fruit juice.  Protein foods  Choose fish and lean poultry more often. Eat red meat and fried meats less often. Dried beans, tofu, and nuts are also good sources of protein.  Dairy  Choose low-fat or fat-free products from this food group. If you have problems digesting milk, try eating cheese or yogurt instead.  Fats and oils  Limit fats and oils if you're trying to cut calories. Choose healthy fats when you cook. These include canola oil  "and olive oil.  Where can you learn more?  Go to https://www.[employer].net/patiented  Enter D676 in the search box to learn more about \"Learning About Dietary Guidelines.\"  Current as of: March 1, 2023               Content Version: 13.7    3534-6330 [employer].   Care instructions adapted under license by your healthcare professional. If you have questions about a medical condition or this instruction, always ask your healthcare professional. [employer], [employer] disclaims any warranty or liability for your use of this information.         "

## 2023-08-09 NOTE — PROGRESS NOTES
"SUBJECTIVE:   Juancarlos is a 68 year old who presents for Preventive Visit.      Are you in the first 12 months of your Medicare coverage?  No    Healthy Habits:     In general, how would you rate your overall health?  Good    Frequency of exercise:  None    Duration of exercise:  Other    Do you usually eat at least 4 servings of fruit and vegetables a day, include whole grains    & fiber and avoid regularly eating high fat or \"junk\" foods?  No    Taking medications regularly:  Yes    Barriers to taking medications:  None    Medication side effects:  None    Ability to successfully perform activities of daily living:  No assistance needed    Home Safety:  No safety concerns identified    Hearing Impairment:  No hearing concerns    In the past 6 months, have you been bothered by leaking of urine?  No    In general, how would you rate your overall mental or emotional health?  Excellent    Additional concerns today:  No    Have you ever done Advance Care Planning? (For example, a Health Directive, POLST, or a discussion with a medical provider or your loved ones about your wishes): No, advance care planning information given to patient to review.  Patient plans to discuss their wishes with loved ones or provider.      Fall risk  Fallen 2 or more times in the past year?: No  Any fall with injury in the past year?: No    Cognitive Screening   1) Repeat 3 items (Leader, Season, Table)    2) Clock draw: NORMAL  3) 3 item recall: Recalls 2 objects   Results: NORMAL clock, 1-2 items recalled: COGNITIVE IMPAIRMENT LESS LIKELY    Mini-CogTM Copyright ALFREDO Harris. Licensed by the author for use in [employer]; reprinted with permission (nick@.[email or web address]). All rights reserved.      Do you have sleep apnea, excessive snoring or daytime drowsiness? : no    Reviewed and updated as needed this visit by clinical staff   Tobacco  Allergies  Meds  Problems             Reviewed and updated as needed this visit by Provider    " Allergies  Meds  Problems            Social History     Tobacco Use    Smoking status: Former     Packs/day: 0.50     Years: 40.00     Pack years: 20.00     Types: Cigarettes     Quit date: 2014     Years since quittin.7    Smokeless tobacco: Never    Tobacco comments:     quit    Substance Use Topics    Alcohol use: Yes     Alcohol/week: 16.7 standard drinks of alcohol     Types: 20 Cans of beer per week     Comment: daily           2023     2:15 PM   Alcohol Use   Prescreen: >3 drinks/day or >7 drinks/week? Yes   AUDIT SCORE  10         2023     2:15 PM   AUDIT - Alcohol Use Disorders Identification Test - Reproduced from the World Health Organization Audit 2001 (Second Edition)   1.  How often do you have a drink containing alcohol? 4 or more times a week   2.  How many drinks containing alcohol do you have on a typical day when you are drinking? 5 or 6   3.  How often do you have five or more drinks on one occasion? Daily or almost daily   4.  How often during the last year have you found that you were not able to stop drinking once you had started? Never   5.  How often during the last year have you failed to do what was normally expected of you because of drinking? Never   6.  How often during the last year have you needed a first drink in the morning to get yourself going after a heavy drinking session? Never   7.  How often during the last year have you had a feeling of guilt or remorse after drinking? Never   8.  How often during the last year have you been unable to remember what happened the night before because of your drinking? Never   9.  Have you or someone else been injured because of your drinking? No   10. Has a relative, friend, doctor or other health care worker been concerned about your drinking or suggested you cut down? No   TOTAL SCORE 10     Do you have a current opioid prescription? No  Do you use any other controlled substances or medications that are not  prescribed by a provider? Alcohol      COPD remains stable.  Has not had any recent breathing troubles beyond usual baseline.  Has not any acute respiratory events.  Remains with intermittent cough, mild shortness of breath with overexertion as per usual.  Using medication as directed with reported side effects.      Hypertension:  History of hypertension, on medication.  No reported side effects from medications.    Reviewed last 6 BP readings in chart:  BP Readings from Last 6 Encounters:   08/09/23 110/60   08/11/22 130/58   07/05/22 (!) 150/80   04/18/22 (!) 148/80   11/18/21 128/76   11/20/20 132/76     No active cardiac complaints or symptoms with exercise.     history of prostate cancer with surgery in 2013.  PSA has slowly been rising    Lab Results   Component Value Date    PSA 1.59 08/09/2023    PSA 1.56 08/02/2022    PSA 1.30 11/18/2021    PSA 1.08 11/20/2020    PSA 0.92 12/28/2018    PSA 0.52 02/26/2018    PSA 0.45 02/24/2017    PSA 0.37 02/23/2016    PSA 0.17 02/23/2015    PSA <0.10 06/26/2014    PSA <0.04 09/26/2013    PSA 19.50 04/25/2011    PSA 19.50 04/25/2011    PSA 10.30 09/29/2008    PSA 10.70 07/30/2007        Current providers sharing in care for this patient include:   Patient Care Team:  Wili Arevalo MD as PCP - General  Wili Arevalo MD as Assigned PCP    The following health maintenance items are reviewed in Epic and correct as of today:  Health Maintenance   Topic Date Due    SPIROMETRY  Never done    COPD ACTION PLAN  Never done    ZOSTER IMMUNIZATION (1 of 2) Never done    LUNG CANCER SCREENING  Never done    COVID-19 Vaccine (3 - Pfizer series) 07/18/2021    Pneumococcal Vaccine: 65+ Years (2 - PCV) 11/20/2021    ANNUAL REVIEW OF HM ORDERS  07/05/2023    INFLUENZA VACCINE (1) 09/01/2023    MEDICARE ANNUAL WELLNESS VISIT  08/09/2024    FALL RISK ASSESSMENT  08/09/2024    COLORECTAL CANCER SCREENING  09/16/2024    DTAP/TDAP/TD IMMUNIZATION (3 - Td or Tdap)  "06/09/2026    LIPID  11/18/2026    ADVANCE CARE PLANNING  08/09/2028    HEPATITIS C SCREENING  Completed    PHQ-2 (once per calendar year)  Completed    AORTIC ANEURYSM SCREENING (SYSTEM ASSIGNED)  Completed    IPV IMMUNIZATION  Aged Out    MENINGITIS IMMUNIZATION  Aged Out       **I reviewed the information recorded in the patient's EPIC chart (including but not limited to medical history, surgical history, family history, problem list, medication list, and allergy list) and updated the information as indicated based on the patients reported information.           Review of Systems   Constitutional:  Negative for chills and fever.   HENT:  Negative for congestion, ear pain and hearing loss.    Eyes:  Negative for pain and visual disturbance.   Respiratory:  Positive for shortness of breath. Negative for cough.    Cardiovascular:  Negative for chest pain, palpitations and peripheral edema.   Gastrointestinal:  Negative for abdominal pain, constipation, diarrhea, heartburn, hematochezia and nausea.   Genitourinary:  Positive for frequency and urgency. Negative for dysuria, genital sores, hematuria, impotence and penile discharge.   Musculoskeletal:  Negative for arthralgias, joint swelling and myalgias.   Skin:  Negative for rash.   Neurological:  Negative for dizziness, weakness, headaches and paresthesias.   Psychiatric/Behavioral:  Negative for mood changes. The patient is not nervous/anxious.      Constitutional, HEENT, cardiovascular, pulmonary, gi and gu systems are negative, except as otherwise noted.    OBJECTIVE:   /60   Pulse 120   Temp 98.2  F (36.8  C) (Oral)   Ht 1.664 m (5' 5.5\")   Wt 86.6 kg (191 lb)   SpO2 96%   BMI 31.30 kg/m   Estimated body mass index is 31.3 kg/m  as calculated from the following:    Height as of this encounter: 1.664 m (5' 5.5\").    Weight as of this encounter: 86.6 kg (191 lb).  Physical Exam  GENERAL alert and no distress  EYES:  Normal sclera,conjunctiva, " EOMI  HENT: oral and posterior pharynx without lesions or erythema, facies symmetric  NECK: Neck supple. No LAD, without thyroidmegaly.  RESP: breath sounds quiet but clear, diminished respiratory excursion, prolonged expiratory phase, no rhonci, no wheezes, no rales   CV: irregular rhythm, mildly tachycardic  LYMPH: no cervical lymph adenopathy appreciated  MS: extremities- no gross deformities of the visible extremities noted,   EXT:  no lower extremity edema  PSYCH: Alert and oriented times 3; speech- coherent  SKIN:  No obvious significant skin lesions on visible portions of face     Diagnostic Test Results:  Labs reviewed in Epic    EKG ( 8/9/23)  atrial fibrillation rate 133, the atrial fibrillation is new compared with prior EKGs    ASSESSMENT / PLAN:       (Z00.00) Encounter for Medicare annual wellness exam  (primary encounter diagnosis)  Comment: Discussed cardiac disease risk factors and cardiac disease risk factor modification, including diabetes screening, blood pressure screening (and management if indicated), and cholesterol screening.   Reviewed immunzation guidelines, including pneumococcal vaccines, annual influenza, and shingles vaccines.   Discussed routine cancer screenings, including skin cancer, colon cancer screening for everyone until age 80, prostate cancer screening in men until age 75, mammogram and PAP/pelvic for women until age 75.   Recommended regular dentist visits to care for remaining teeth.   Recommended regular screening for vision and glaucoma.   Recommended safe driving and accident avoidance.   Plan:       (I48.91) New onset atrial fibrillation (H)  Comment:         New onset atrial fibrillation.  atrial fibrillation is very common heart arrhythmia creating an irregular and more erratic heart rhythm.  As many as 20% of people over age 70 may develop this condition, especially those with a history of hypertension.      Main management considerations for atrial  fibrillation:    --Investigate and manage any reversible conditions causing the atrial fibrillation (like electrolyte abnormalities, thyroid problems, removing excessive caffeine, removing alcohol use, etc.)  In your case:  there are no obvious reversible causes.    --Rate control.  In your case: your rate is not controlled    --Consider anticoagulation if your risk from stroke from atrial fibrillation is high enough.   In your case, your risk for stroke is high enough to warrant anticoagulation     Start Diltiazem 180 mg once per day, this will help control heart rate and prevent faster heart rhythm.      Reduce the dose of the Olmesartan to 20 mg once per day because the Diltiazem can also lower blood pressure.     Main side effects from any anticoagulation include increased risk of bleeding and bruising.  Let us know if you experience any significant bleeding or bruising.      I strongly recommend that you consider anticoagulation medication to reduce the risk of stroke from atrial fibrillation.  Options include warfarin (very cheap, but requires regular labs draws to monitor, and food control to limit vegetables) or novel oral anticoagulants (NOACs), e.g Xarelto, Eliquis, Pradaxa that work very well with no labs required, but may be expensive.      If the prescription for Eliquis is too expensive, then do not take it and discuss with the Cardiologist.       You CANNOT take ANY aspirin (even the low dose aspirin) or NSAIDS (Advil, Motrin, Aleve, Ibuprofen, or prescription versions of these medications) while taking anticoagulation like this.       Echocardiogram ( heart ultrasound) to evaluate the structure of your heart and pumping performance.   You will be contacted to arrange this imaging study.     Cardiology referral to evaluate your results and make any other additional recommendations.  They will consider you for    [facility] will call you to coordinate your cardiology appointment. If you don't  hear from a representative within 2 business days, please call [phone] or [facility] (locations [place], [place], [place]) phone [phone]  (Fax [phone])        Plan: Echocardiogram Complete, Adult Cardiology Eval         [facility] Referral, diltiazem ER (DILT-XR) 180         MG 24 hr capsule, apixaban ANTICOAGULANT         (ELIQUIS) 5 MG tablet            (J44.1) COPD exacerbation (H)  Comment: This condition is currently controlled on the current medical regimen.  Continue current therapy.   Discussed general issues of COPD, including pathophysiology, ways it will affect the pt., when to seek help, reviewed the typical medications (how they are taken, how they help)   Plan: albuterol (PROAIR HFA/PROVENTIL HFA/VENTOLIN         HFA) 108 (90 Base) MCG/ACT inhaler            (C61) Prostate cancer (H)  Comment: continue to monitor, PSAs have been slowly rising, indicating probable recurrance   Needs to follow up with Urology.   Plan: PSA, tumor marker            (I10) Essential hypertension, benign  Comment: This condition is currently controlled on the current medical regimen.  Continue current therapy.   Plan: CBC with platelets, TSH with free T4 reflex,         Basic metabolic panel, AST, ALT, diltiazem ER         (DILT-XR) 180 MG 24 hr capsule, olmesartan         (BENICAR) 20 MG tablet, T4 free            (R00.0) Tachycardia  Comment: as above.   Plan: EKG 12-lead complete w/read - Clinics, CBC with        platelets, TSH with free T4 reflex, Basic         metabolic panel, T4 free               Patient has been advised of split billing requirements and indicates understanding: Yes      COUNSELING:  Reviewed preventive health counseling, as reflected in patient instructions       Regular exercise       Healthy diet/nutrition       Vision screening       Hearing screening       Dental care       Bladder control       Fall risk prevention       Immunizations  Covid vaccines are now  recommended annually.  Get the most updated Covid vaccine when it becomes available, consider getting this at the same time as the annual influenza vaccine.            Colon cancer screening       Prostate cancer screening        He reports that he quit smoking about 8 years ago. His smoking use included cigarettes. He has a 20.00 pack-year smoking history. He has never used smokeless tobacco.      Appropriate preventive services were discussed with this patient, including applicable screening as appropriate for cardiovascular disease, diabetes, osteopenia/osteoporosis, and glaucoma.  As appropriate for age/gender, discussed screening for colorectal cancer, prostate cancer, breast cancer, and cervical cancer. Checklist reviewing preventive services available has been given to the patient.    Reviewed patients plan of care and provided an AVS. The  for Juancarlos meets the Care Plan requirement. This Care Plan has been established and reviewed with the .          Wili Arevalo MD  [facility]    Identified Health Risks:

## 2023-08-10 ENCOUNTER — TELEPHONE (OUTPATIENT)
Dept: CARDIOLOGY | Facility: CLINIC | Age: 68
End: 2023-08-10

## 2023-08-10 NOTE — TELEPHONE ENCOUNTER
[facility] Call Center    Phone Message    May a detailed message be left on voicemail: yes     Reason for Call: Appointment Intake    Referring Provider Name: Wili De La Rosa  Diagnosis and/or Symptoms: New onset atrial fibrillation (H) [I48.91]. Per referral patient to be seen in 1-2 weeks and he needs an echo. Please assist patient coordinating appointments.     Action Taken: Other: Cardio    Travel Screening: Not Applicable

## 2023-08-10 NOTE — RESULT ENCOUNTER NOTE
Your labs all looked similar and stable to previous levels.  No obvious causes for the new atrial fibrillation were seen in these lab results.  The TSH level was minimally elevated however the actual thyroid level (free T4) was normal indicating a normal thyroid level.    The PSA level similar to last year.  While I am glad the level has not changed significantly, it still is higher than desired after a prostatectomy which could possibly indicate microscopic residual disease.    I recommend follow-up with the urologist at your convenience.   Please schedule an appointment at your convenience (I entered a referral)    --[facility] -  Multiple locations ([place], [place], etc.) main scheduling number [phone]    Continue plans for managing the atrial fibrillation with the echocardiogram and cardiology appointment.    Follow-up with me in 4 to 6 months.

## 2023-08-31 ENCOUNTER — HOSPITAL ENCOUNTER (OUTPATIENT)
Dept: CARDIOLOGY | Facility: CLINIC | Age: 68
Discharge: HOME | End: 2023-08-31
Attending: INTERNAL MEDICINE | Admitting: INTERNAL MEDICINE
Payer: COMMERCIAL

## 2023-08-31 DIAGNOSIS — I48.91 NEW ONSET ATRIAL FIBRILLATION (H): ICD-10-CM

## 2023-08-31 LAB — LVEF ECHO: NORMAL

## 2023-08-31 PROCEDURE — 93306 TTE W/DOPPLER COMPLETE: CPT | Mod: 26 | Performed by: INTERNAL MEDICINE

## 2023-08-31 PROCEDURE — 255N000002 HC RX 255 OP 636: Performed by: INTERNAL MEDICINE

## 2023-08-31 PROCEDURE — 999N000208 ECHOCARDIOGRAM COMPLETE

## 2023-08-31 RX ADMIN — HUMAN ALBUMIN MICROSPHERES AND PERFLUTREN 9 ML: 10; .22 INJECTION, SOLUTION INTRAVENOUS at 13:47

## 2023-09-13 ENCOUNTER — CARE COORDINATION (OUTPATIENT)
Dept: CARDIOLOGY | Facility: CLINIC | Age: 68
End: 2023-09-13

## 2023-09-13 ENCOUNTER — OFFICE VISIT (OUTPATIENT)
Dept: CARDIOLOGY | Facility: CLINIC | Age: 68
End: 2023-09-13
Payer: COMMERCIAL

## 2023-09-13 VITALS
SYSTOLIC BLOOD PRESSURE: 168 MMHG | DIASTOLIC BLOOD PRESSURE: 82 MMHG | HEIGHT: 66 IN | OXYGEN SATURATION: 95 % | BODY MASS INDEX: 31.34 KG/M2 | WEIGHT: 195 LBS | HEART RATE: 60 BPM

## 2023-09-13 DIAGNOSIS — I48.19 PERSISTENT ATRIAL FIBRILLATION (H): Primary | ICD-10-CM

## 2023-09-13 DIAGNOSIS — I48.91 NEW ONSET ATRIAL FIBRILLATION (H): ICD-10-CM

## 2023-09-13 PROCEDURE — 99204 OFFICE O/P NEW MOD 45 MIN: CPT | Performed by: INTERNAL MEDICINE

## 2023-09-13 NOTE — PROGRESS NOTES
CARDIOLOGY CLINIC CONSULTATION    PRIMARY CARE PHYSICIAN:  Wili Arevalo    HISTORY OF PRESENT ILLNESS:  This is a very pleasant 68-year-old gentleman who denies any prior cardiovascular history.  He has a history of hypertension.  He is mildly overweight with a BMI of 32.  No CAD history.  Former tobacco user.  Quit 4 years ago.    Recently at a routine PCP physical he was noted to have an irregular heart rhythm and ECG confirmed what appears to be typical atrial flutter with variable block.  Patient is asymptomatic from a cardiac standpoint.  Occasionally he has mild shortness of breath with exertion and some intermittent chest twinges which are not new.  They do not sound anginal in nature.  No syncope presyncope.    PCP started him on diltiazem and Eliquis.  He 68-year-old male with hypertension.  No bleeding problems so far.    PAST MEDICAL HISTORY:  Past Medical History:   Diagnosis Date    Diverticulosis of colon 10/2/12    incidental sigmoid diverticulosis seen on routine colonoscopy, no h/o diverticulitis    Essential hypertension, benign     Fracture of fifth metacarpal bone of left hand 11/07/2017    intra-articular left 5th metacarpal base fracture with minimally dispalced left radial styloid fracture; no operative management    Prostate cancer (H) 2013    S/P partial colectomy 07/24/2019    s/p robotic assisted sigmoid colectomy for recurrent diverticulitis    Serrated adenoma of colon 10/2/12    2 sessile serrated adenomatous polyps removed at colonoscopy; 1 tubular adenoma       MEDICATIONS:  Current Outpatient Medications   Medication    apixaban ANTICOAGULANT (ELIQUIS) 5 MG tablet    diltiazem ER (DILT-XR) 180 MG 24 hr capsule    olmesartan (BENICAR) 20 MG tablet    albuterol (PROAIR HFA/PROVENTIL HFA/VENTOLIN HFA) 108 (90 Base) MCG/ACT inhaler    albuterol (PROAIR HFA/PROVENTIL HFA/VENTOLIN HFA) 108 (90 Base) MCG/ACT inhaler    sildenafil (VIAGRA) 100 MG tablet     No current  facility-administered medications for this visit.       SOCIAL HISTORY:  I have reviewed this patient's social history and updated it with pertinent information if needed. Juancarlos Ma  reports that he quit smoking about 8 years ago. His smoking use included cigarettes. He has a 20.00 pack-year smoking history. He has never used smokeless tobacco. He reports current alcohol use of about 16.7 standard drinks of alcohol per week. He reports current drug use. Drug: Marijuana.    PHYSICAL EXAM:  Pulse:  [60] 60  BP: (168)/(82) 168/82  SpO2:  [95 %] 95 %  195 lbs 0 oz    Constitutional: alert, no distress  Respiratory: Good bilateral air entry  Cardiovascular: Irregular heart sound soft systolic murmur no rubs or gallops  GI: nondistended  Neuropsychiatric: appropriate affact    ASSESSMENT: Pertinent issues addressed/ reviewed during this cardiology visit  Typical atrial flutter with variable block    RECOMMENDATIONS:  The patient's ECG appears to show atrial flutter.  This appears to be right-sided and very likely cavotricuspid isthmus dependent.  Discussed in detail rate versus rhythm control.  Patient is not significantly symptomatic except for mild shortness of breath.  Discussed various approaches for rhythm control.  Also discussed bleeding versus stroke risk and rationale behind anticoagulation.  At this time with his [id number] score of 2 based on age and hypertension, I recommend anticoagulation.  Agree with Eliquis.  Watch for bleeding.  In regards to his atrial flutter, we are going to attempt a one-time cardioversion.  We will do this after 3 to 4 weeks of uninterrupted anticoagulation.  If there is any recurrence in the future, I recommend ablation.  We will do a 14-day Zio monitor after his cardioversion.  Follow-up with an LOIDA in about a month after his cardioversion.  If he continues to have shortness of breath and chest pain I would recommend getting ischemic work-up.    See me back in 6 months he  respectively.    It was a pleasure seeing this patient in clinic today. Please do not hesitate to contact me with any future questions.     TYLER Sorensen, [facility]  Cardiology - [facility] [place] [facility]  September 13, 2023    Review of the result(s) of each unique test - Last echo CBC lipids ECG     The level of medical decision making during this visit was of moderate complexity.    This note was completed in part using dictation via the Dragon voice recognition software. Some word and grammatical errors may occur and must be interpreted in the appropriate clinical context.  If there are any questions pertaining to this issue, please contact me for further clarification.

## 2023-09-13 NOTE — PROGRESS NOTES
Message -----   From: oSnja Julian LPN   Sent: 9/13/2023  11:55 AM CDT   To: [facility] Heart Team 7   Subject: sleep study                                      Hi Team 7   Dr. Plascencia   Planned on putting an order in for a sleep study for this patient.   There is not an order in.   Could you  let him know to put one in ?   Thank  you

## 2023-09-13 NOTE — LETTER
9/13/2023    Wili Arevalo MD  [address] [facility] [address]    RE: Juancarlos Ma       Dear Colleague,     I had the pleasure of seeing Juancarlos Ma in the [facility].  CARDIOLOGY CLINIC CONSULTATION    PRIMARY CARE PHYSICIAN:  Wili Arevalo    HISTORY OF PRESENT ILLNESS:  This is a very pleasant 68-year-old gentleman who denies any prior cardiovascular history.  He has a history of hypertension.  He is mildly overweight with a BMI of 32.  No CAD history.  Former tobacco user.  Quit 4 years ago.    Recently at a routine PCP physical he was noted to have an irregular heart rhythm and ECG confirmed what appears to be typical atrial flutter with variable block.  Patient is asymptomatic from a cardiac standpoint.  Occasionally he has mild shortness of breath with exertion and some intermittent chest twinges which are not new.  They do not sound anginal in nature.  No syncope presyncope.    PCP started him on diltiazem and Eliquis.  He 68-year-old male with hypertension.  No bleeding problems so far.    PAST MEDICAL HISTORY:  Past Medical History:   Diagnosis Date    Diverticulosis of colon 10/2/12    incidental sigmoid diverticulosis seen on routine colonoscopy, no h/o diverticulitis    Essential hypertension, benign     Fracture of fifth metacarpal bone of left hand 11/07/2017    intra-articular left 5th metacarpal base fracture with minimally dispalced left radial styloid fracture; no operative management    Prostate cancer (H) 2013    S/P partial colectomy 07/24/2019    s/p robotic assisted sigmoid colectomy for recurrent diverticulitis    Serrated adenoma of colon 10/2/12    2 sessile serrated adenomatous polyps removed at colonoscopy; 1 tubular adenoma       MEDICATIONS:  Current Outpatient Medications   Medication    apixaban ANTICOAGULANT (ELIQUIS) 5 MG tablet    diltiazem ER (DILT-XR) 180 MG 24 hr capsule    olmesartan (BENICAR) 20 MG tablet    albuterol (PROAIR  HFA/PROVENTIL HFA/VENTOLIN HFA) 108 (90 Base) MCG/ACT inhaler    albuterol (PROAIR HFA/PROVENTIL HFA/VENTOLIN HFA) 108 (90 Base) MCG/ACT inhaler    sildenafil (VIAGRA) 100 MG tablet     No current facility-administered medications for this visit.       SOCIAL HISTORY:  I have reviewed this patient's social history and updated it with pertinent information if needed. Juancarlos Ma  reports that he quit smoking about 8 years ago. His smoking use included cigarettes. He has a 20.00 pack-year smoking history. He has never used smokeless tobacco. He reports current alcohol use of about 16.7 standard drinks of alcohol per week. He reports current drug use. Drug: Marijuana.    PHYSICAL EXAM:  Pulse:  [60] 60  BP: (168)/(82) 168/82  SpO2:  [95 %] 95 %  195 lbs 0 oz    Constitutional: alert, no distress  Respiratory: Good bilateral air entry  Cardiovascular: Irregular heart sound soft systolic murmur no rubs or gallops  GI: nondistended  Neuropsychiatric: appropriate affact    ASSESSMENT: Pertinent issues addressed/ reviewed during this cardiology visit  Typical atrial flutter with variable block    RECOMMENDATIONS:  The patient's ECG appears to show atrial flutter.  This appears to be right-sided and very likely cavotricuspid isthmus dependent.  Discussed in detail rate versus rhythm control.  Patient is not significantly symptomatic except for mild shortness of breath.  Discussed various approaches for rhythm control.  Also discussed bleeding versus stroke risk and rationale behind anticoagulation.  At this time with his [id number] score of 2 based on age and hypertension, I recommend anticoagulation.  Agree with Eliquis.  Watch for bleeding.  In regards to his atrial flutter, we are going to attempt a one-time cardioversion.  We will do this after 3 to 4 weeks of uninterrupted anticoagulation.  If there is any recurrence in the future, I recommend ablation.  We will do a 14-day Zio monitor after his  cardioversion.  Follow-up with an LOIDA in about a month after his cardioversion.  If he continues to have shortness of breath and chest pain I would recommend getting ischemic work-up.    See me back in 6 months he respectively.    It was a pleasure seeing this patient in clinic today. Please do not hesitate to contact me with any future questions.     TYLER Sorensen, [facility]  Cardiology - [facility]  September 13, 2023    Review of the result(s) of each unique test - Last echo CBC lipids ECG     The level of medical decision making during this visit was of moderate complexity.    This note was completed in part using dictation via the Dragon voice recognition software. Some word and grammatical errors may occur and must be interpreted in the appropriate clinical context.  If there are any questions pertaining to this issue, please contact me for further clarification.    Thank you for allowing me to participate in the care of your patient.      Sincerely,     Sharmaine Plascencia MD     [facility]  cc:   No referring provider defined for this encounter.

## 2023-09-13 NOTE — PATIENT INSTRUCTIONS
Blood thinner uninterrupted  Shocking procedure after 3 weeks  After shocking, heart monitor for 2 weeks  Then follow up after that

## 2023-10-06 ENCOUNTER — CARE COORDINATION (OUTPATIENT)
Dept: CARDIOLOGY | Facility: CLINIC | Age: 68
End: 2023-10-06
Payer: COMMERCIAL

## 2023-10-06 NOTE — PROGRESS NOTES
I called pt and reviewed cardioversion prep instructions listed below. Pt confirmed he has not missed a dose of eliquis for the past 21 days. Pt thought his procedure was going to be at 0830. I confirmed with him that the check in time is 0830 AM, and the procedure time will be around 1030 AM. Abdi BORGES October 6, 2023, 3:09 PM

## 2023-10-06 NOTE — PROGRESS NOTES
DCCV/PABLO prep instructions    Patient is scheduled for a Cardioversion at [facility] - [address], [place] [address] - Main Entrance of the Hospital, on 10/9/23.  Check in time is at 0830 AM and procedure to follow.    Patient instructed to remain NPO for solid foods 8 hours prior to arrival and may have clear liquids up to 2 hours prior to arrival.    Patient is taking Pradaxa/Xarelto/Eliquis and has been taking daily uninterrupted for at least 21days.     Patient is not diabetic.     Patient is not taking Digoxin.    Patient is taking not on diuretics. and has been advised to hold this the morning of the procedure.    Pt is not on a SGLT2 inhibitor.    Pt is not on a GLP-1 Agonist    Patient advised to take their other daily medications the morning of the procedure with small sips of water.     Verified patient has someone available to drive them home from the hospital and can stay with them for 24 hours after the procedure.     Patient advised to notify care team with any new COVID like symptoms prior to procedure.    Patient will check their temperature the morning of procedure and call [facility] at [phone] if temp is >100.0.    Patient is aware of visitor policy.    Patient expresses understanding of above instructions and denies further questions at this time.        [facility]

## 2023-10-09 ENCOUNTER — HOSPITAL ENCOUNTER (OUTPATIENT)
Dept: MEDSURG UNIT | Facility: CLINIC | Age: 68
Discharge: HOME | End: 2023-10-09
Attending: INTERNAL MEDICINE | Admitting: INTERNAL MEDICINE
Payer: COMMERCIAL

## 2023-10-09 ENCOUNTER — ANESTHESIA (OUTPATIENT)
Dept: SURGERY | Facility: CLINIC | Age: 68
End: 2023-10-09
Payer: COMMERCIAL

## 2023-10-09 ENCOUNTER — ANESTHESIA EVENT (OUTPATIENT)
Dept: SURGERY | Facility: CLINIC | Age: 68
End: 2023-10-09
Payer: COMMERCIAL

## 2023-10-09 ENCOUNTER — HOSPITAL ENCOUNTER (OUTPATIENT)
Facility: CLINIC | Age: 68
Discharge: HOME | End: 2023-10-09
Admitting: INTERNAL MEDICINE
Payer: COMMERCIAL

## 2023-10-09 VITALS
HEART RATE: 71 BPM | OXYGEN SATURATION: 97 % | HEIGHT: 66 IN | SYSTOLIC BLOOD PRESSURE: 131 MMHG | WEIGHT: 190 LBS | RESPIRATION RATE: 15 BRPM | BODY MASS INDEX: 30.53 KG/M2 | TEMPERATURE: 96.2 F | DIASTOLIC BLOOD PRESSURE: 84 MMHG

## 2023-10-09 DIAGNOSIS — I48.19 PERSISTENT ATRIAL FIBRILLATION (H): ICD-10-CM

## 2023-10-09 LAB
MAGNESIUM SERPL-MCNC: 2 MG/DL (ref 1.7–2.3)
POTASSIUM SERPL-SCNC: 4.7 MMOL/L (ref 3.4–5.3)

## 2023-10-09 PROCEDURE — 93005 ELECTROCARDIOGRAM TRACING: CPT

## 2023-10-09 PROCEDURE — 92960 CARDIOVERSION ELECTRIC EXT: CPT

## 2023-10-09 PROCEDURE — 250N000011 HC RX IP 250 OP 636: Performed by: NURSE ANESTHETIST, CERTIFIED REGISTERED

## 2023-10-09 PROCEDURE — 370N000017 HC ANESTHESIA TECHNICAL FEE, PER MIN

## 2023-10-09 PROCEDURE — 999N000010 HC STATISTIC ANES STAT CODE-CRNA PER MINUTE

## 2023-10-09 PROCEDURE — 93010 ELECTROCARDIOGRAM REPORT: CPT | Mod: XU | Performed by: INTERNAL MEDICINE

## 2023-10-09 PROCEDURE — 258N000003 HC RX IP 258 OP 636: Performed by: INTERNAL MEDICINE

## 2023-10-09 PROCEDURE — 999N000184 HC STATISTIC TELEMETRY

## 2023-10-09 PROCEDURE — 83735 ASSAY OF MAGNESIUM: CPT | Performed by: INTERNAL MEDICINE

## 2023-10-09 PROCEDURE — 84132 ASSAY OF SERUM POTASSIUM: CPT | Performed by: INTERNAL MEDICINE

## 2023-10-09 PROCEDURE — 36415 COLL VENOUS BLD VENIPUNCTURE: CPT | Performed by: INTERNAL MEDICINE

## 2023-10-09 PROCEDURE — 999N000054 HC STATISTIC EKG NON-CHARGEABLE

## 2023-10-09 PROCEDURE — 36591 DRAW BLOOD OFF VENOUS DEVICE: CPT

## 2023-10-09 RX ORDER — PROPOFOL 10 MG/ML
INJECTION, EMULSION INTRAVENOUS PRN
Status: DISCONTINUED | OUTPATIENT
Start: 2023-10-09 | End: 2023-10-09

## 2023-10-09 RX ORDER — NALOXONE HYDROCHLORIDE 0.4 MG/ML
0.4 INJECTION, SOLUTION INTRAMUSCULAR; INTRAVENOUS; SUBCUTANEOUS
Status: DISCONTINUED | OUTPATIENT
Start: 2023-10-09 | End: 2023-10-09 | Stop reason: HOSPADM

## 2023-10-09 RX ORDER — NALOXONE HYDROCHLORIDE 0.4 MG/ML
0.2 INJECTION, SOLUTION INTRAMUSCULAR; INTRAVENOUS; SUBCUTANEOUS
Status: DISCONTINUED | OUTPATIENT
Start: 2023-10-09 | End: 2023-10-09 | Stop reason: HOSPADM

## 2023-10-09 RX ORDER — MAGNESIUM SULFATE HEPTAHYDRATE 40 MG/ML
2 INJECTION, SOLUTION INTRAVENOUS
Status: DISCONTINUED | OUTPATIENT
Start: 2023-10-09 | End: 2023-10-09 | Stop reason: HOSPADM

## 2023-10-09 RX ORDER — ATROPINE SULFATE 0.1 MG/ML
.5-1 INJECTION INTRAVENOUS
Status: DISCONTINUED | OUTPATIENT
Start: 2023-10-09 | End: 2023-10-09 | Stop reason: HOSPADM

## 2023-10-09 RX ORDER — FLUMAZENIL 0.1 MG/ML
0.2 INJECTION, SOLUTION INTRAVENOUS
Status: DISCONTINUED | OUTPATIENT
Start: 2023-10-09 | End: 2023-10-09 | Stop reason: HOSPADM

## 2023-10-09 RX ORDER — POTASSIUM CHLORIDE 1500 MG/1
40 TABLET, EXTENDED RELEASE ORAL
Status: DISCONTINUED | OUTPATIENT
Start: 2023-10-09 | End: 2023-10-09 | Stop reason: HOSPADM

## 2023-10-09 RX ORDER — SODIUM CHLORIDE 9 MG/ML
INJECTION, SOLUTION INTRAVENOUS CONTINUOUS
Status: DISCONTINUED | OUTPATIENT
Start: 2023-10-09 | End: 2023-10-09 | Stop reason: HOSPADM

## 2023-10-09 RX ORDER — POTASSIUM CHLORIDE 1500 MG/1
20 TABLET, EXTENDED RELEASE ORAL
Status: DISCONTINUED | OUTPATIENT
Start: 2023-10-09 | End: 2023-10-09 | Stop reason: HOSPADM

## 2023-10-09 RX ADMIN — SODIUM CHLORIDE: 9 INJECTION, SOLUTION INTRAVENOUS at 09:02

## 2023-10-09 RX ADMIN — PROPOFOL 60 MG: 10 INJECTION, EMULSION INTRAVENOUS at 10:30

## 2023-10-09 RX ADMIN — PROPOFOL 20 MG: 10 INJECTION, EMULSION INTRAVENOUS at 10:33

## 2023-10-09 RX ADMIN — PROPOFOL 20 MG: 10 INJECTION, EMULSION INTRAVENOUS at 10:31

## 2023-10-09 ASSESSMENT — ENCOUNTER SYMPTOMS
DYSRHYTHMIAS: 1
SEIZURES: 0

## 2023-10-09 ASSESSMENT — LIFESTYLE VARIABLES: TOBACCO_USE: 1

## 2023-10-09 ASSESSMENT — ACTIVITIES OF DAILY LIVING (ADL)
ADLS_ACUITY_SCORE: 37
ADLS_ACUITY_SCORE: 37

## 2023-10-09 NOTE — PROCEDURES
Cardioversion Note    Informed consent was signed by the patient.  Confirmed patient has been taking eliquis for >3 weeks without missing does.  A timeout was taken.    Anesthesia was present for cardioversion, see separate documentation for their sedation.    Baseline rhythm: Atrial flutter with HR 99 bpm  Synchronized cardioversion performed at 100J, successful conversion to sinus rhythm  Post-DCCV rhythm: Sinus rhythm at HR 67 bpm.      No complications.  Continue current medications including eliquis uninterrupted.  Follow up with cardiology as scheduled.    Yaz Arredondo MD [facility]  October 9, 2023

## 2023-10-09 NOTE — DISCHARGE INSTRUCTIONS
Cardioversion Discharge Instructions    After you go home:       For 24 hours - due to the sedation you received:    Have an adult stay with you for 24 hours.   Relax and take it easy.  Do NOT make any important or legal decisions.  Do NOT drive or operate machines at home or at work.  Do NOT drink alcohol.    Diet:    Start with clear liquids and progress to your normal diet as you feel able.    Medicines:    Take your medications, including blood thinners, unless your provider tells you not to.  If you have stopped any medications, check with your provider about when to restart them.    Follow Up Appointments:    Follow up with your cardiologist at [facility] of patient preference as instructed.  Follow up with your primary care provider as needed.    Post cardioversion:    The skin on your chest or back may feel tender for 48 hours.  If your skin is tender, you may:    Use a cold pack on the site. Never use ice directly on your skin. Use the cold pack for 20 minutes. Remove it for at least 30 minutes before re-using.  Apply 1% hydrocortisone cream to the skin (sold at drug stores)  Take Advil (Ibuprofen) or Tylenol (Acetaminophen) per your provider's recommendations.      Call your provider if you have:    Weakness, dizziness, lightheadedness, or fainting.  Shortness of breath.  Irregular heartbeat, feelings of your heart fluttering or beating fast, hard or palpitations.   More than minor skin discomfort or redness where the cardioversion pads were placed.  Questions or concerns.      Call 911 if you have:    Pain in your chest, arm, shoulder, neck, or upper back.  You have problems speaking or seeing.  Weakness in your arm or leg.  You are unable to move your arm or leg.  You have uncontrolled bleeding.         [facility] [place]:    [phone] [facility] (7 days a week)

## 2023-10-09 NOTE — PROGRESS NOTES
Care Suites Admission Nursing Note    Patient Information  Name: Juancarlos Ma  Age: 68 year old  Reason for admission: [facility]  Care Suites arrival time: 0830    Visitor Information  Name: Kitty centeno     Patient Admission/Assessment   Pre-procedure assessment complete: Yes  If abnormal assessment/labs, provider notified: N/A  NPO: Yes  Medications held per instructions/orders: N/A  Consent: obtained  If applicable, pregnancy test status: deferred  Patient oriented to room: Yes  Education/questions answered: Yes  Plan/other: proceed with [facility]    Discharge Planning  Discharge name/phone number: Kitty   Overnight post sedation caregiver: Sister will stay for 6 hrs  Discharge location: home    Serena Vaca RN

## 2023-10-09 NOTE — PROGRESS NOTES
Care Suites Discharge Nursing Note    Patient Information  Name: Juancarlos Ma  Age: 68 year old    Discharge Education:  Discharge instructions reviewed: Yes  Additional education/resources provided: na  Patient/patient representative verbalizes understanding: Yes  Patient discharging on new medications: No  Medication education completed: N/A    Discharge Plans:   Discharge location: home  Discharge ride contacted: Yes  Approximate discharge time: 1125    Discharge Criteria:  Discharge criteria met and vital signs stable: Yes    Patient Belongs:  Patient belongings returned to patient: Yes    Serena Vaca RN

## 2023-10-09 NOTE — PROGRESS NOTES
Care Suites Post Procedure Note    Patient Information  Name: Juancarlos Ma  Age: 68 year old    Post Procedure  Time patient returned to Care Suites: remains in care suites  Concerns/abnormal assessment: none. Awake and alert. No co SR VSS.  If abnormal assessment, provider notified: N/A  Plan/Other: Discharge with sister    Serena Vaca RN

## 2023-10-09 NOTE — ANESTHESIA POSTPROCEDURE EVALUATION
Patient: Juancarlos Ma    Procedure: Procedure(s):  cardioversion       Anesthesia Type:  MAC    Note:  Disposition: Outpatient   Postop Pain Control: Uneventful            Sign Out: Well controlled pain   PONV: No   Neuro/Psych: Uneventful            Sign Out: Acceptable/Baseline neuro status   Airway/Respiratory: Uneventful            Sign Out: Acceptable/Baseline resp. status   CV/Hemodynamics: Uneventful            Sign Out: Acceptable CV status; No obvious hypovolemia; No obvious fluid overload   Other NRE: NONE   DID A NON-ROUTINE EVENT OCCUR? No           Last vitals:  Vitals:    10/09/23 1100 10/09/23 1100 10/09/23 1108   BP: 114/82 114/82 131/84   Pulse: 70 69 71   Resp: 14  15   Temp:      SpO2: 99%  97%       Electronically Signed By: Esther Virk MD  October 9, 2023  2:00 PM

## 2023-10-09 NOTE — ANESTHESIA PREPROCEDURE EVALUATION
Anesthesia Pre-Procedure Evaluation    Patient: Juancarlos Ma   MRN: [id number] : 1955        Procedure : Procedure(s):  cardioversion          Past Medical History:   Diagnosis Date    Diverticulosis of colon 10/2/12    incidental sigmoid diverticulosis seen on routine colonoscopy, no h/o diverticulitis    Essential hypertension, benign     Fracture of fifth metacarpal bone of left hand 2017    intra-articular left 5th metacarpal base fracture with minimally dispalced left radial styloid fracture; no operative management    Prostate cancer (H)     S/P partial colectomy 2019    s/p robotic assisted sigmoid colectomy for recurrent diverticulitis    Serrated adenoma of colon 10/2/12    2 sessile serrated adenomatous polyps removed at colonoscopy; 1 tubular adenoma      Past Surgical History:   Procedure Laterality Date    DAVINCI PROSTATECTOMY  6/10/2013    Procedure: DAVINCI PROSTATECTOMY;  ROBOTIC ASSIST PROSTATECTOMY;  Surgeon: Damon Jarrell MD;  Location:  OR    DAVINCI XI ASSISTED RESECTION RECTOSIGMOID N/A 2019    Procedure: ROBOTIC ASSISTED SIGMOID COLECTOMY, ROBOT ASSISTED SMALL BOWEL RESECTION, ROBOT ASSISTED MOBILIZATION OF THE SPLENIC FLEXURE;  Surgeon: Neel Grimes MD;  Location:  OR     TOOTH EXTRACTION W/FORCEP      4 wisdom teeth removed without complication      Allergies   Allergen Reactions    Lisinopril      Cough with Lisinopril    Penicillin [Penicillins] Hives      Social History     Tobacco Use    Smoking status: Former     Packs/day: 0.50     Years: 40.00     Pack years: 20.00     Types: Cigarettes     Quit date: 2014     Years since quittin.8    Smokeless tobacco: Never    Tobacco comments:     quit    Substance Use Topics    Alcohol use: Yes     Alcohol/week: 16.7 standard drinks of alcohol     Types: 20 Cans of beer per week     Comment: 6 pack of beer daily      Wt Readings from Last 1 Encounters:   23 88.5 kg (195  lb)        Anesthesia Evaluation   Pt has had prior anesthetic.     No history of anesthetic complications       ROS/MED HX  ENT/Pulmonary:     (+)                tobacco use, Past use,                   (-) recent URI   Neurologic:    (-) no seizures, no CVA and no TIA   Cardiovascular:     (+)  hypertension- -   -  - -   Taking blood thinners                     dysrhythmias, a-flutter,        Previous cardiac testing   Echo: Date: 8/2023 Results:  Interpretation Summary     1. The left ventricle is normal in structure, function and size. The visual  ejection fraction is estimated at 55%.  2. The right ventricle is normal size. Mildly decreased right ventricular  systolic function  3. No valve disease.    Stress Test:  Date: Results:    ECG Reviewed:  Date: Results:    Cath:  Date: Results:      METS/Exercise Tolerance: >4 METS    Hematologic:    (-) history of blood clots   Musculoskeletal:       GI/Hepatic: Comment: s/p robotic assisted sigmoid colectomy for recurrent diverticulitis    (+) GERD,                (-) liver disease   Renal/Genitourinary:    (-) renal disease   Endo:     (+)               Obesity (BMI 30),       Psychiatric/Substance Use:       Infectious Disease:    (-) Recent Fever   Malignancy:       Other:            Physical Exam    Airway        Mallampati: II   TM distance: > 3 FB   Neck ROM: full   Mouth opening: > 3 cm    Respiratory Devices and Support         Dental       (+) Minor Abnormalities - some fillings, tiny chips      Cardiovascular          Rhythm and rate: irregular and normal     Pulmonary           breath sounds clear to auscultation           OUTSIDE LABS:  CBC:   Lab Results   Component Value Date    WBC 7.4 08/09/2023    WBC 5.7 11/18/2021    HGB 15.1 08/09/2023    HGB 15.4 08/02/2022    HCT 44.5 08/09/2023    HCT 44.6 11/18/2021     08/09/2023     11/18/2021     BMP:   Lab Results   Component Value Date     08/09/2023     08/02/2022    POTASSIUM  4.8 08/09/2023    POTASSIUM 4.5 08/02/2022    CHLORIDE 100 08/09/2023    CHLORIDE 100 08/02/2022    CO2 28 08/09/2023    CO2 22 08/02/2022    BUN 20.9 08/09/2023    BUN 19 08/02/2022    CR 1.27 (H) 08/09/2023    CR 1.28 (H) 08/02/2022    GLC 92 08/09/2023    GLC 94 08/02/2022     COAGS:   Lab Results   Component Value Date    PTT 37 06/17/2019    INR 1.20 (H) 06/17/2019     POC:   Lab Results   Component Value Date    BGM 96 07/26/2019     HEPATIC:   Lab Results   Component Value Date    ALBUMIN 4.1 11/18/2021    PROTTOTAL 8.3 11/18/2021    ALT 17 08/09/2023    AST 24 08/09/2023    ALKPHOS 60 11/18/2021    BILITOTAL 0.9 11/18/2021     OTHER:   Lab Results   Component Value Date    HARDY 10.1 08/09/2023    PHOS 3.3 07/25/2019    MAG 1.3 (L) 07/25/2019    LIPASE 100 12/16/2018    TSH 5.16 (H) 08/09/2023    T4 1.06 08/09/2023       Anesthesia Plan    ASA Status:  3    NPO Status:  NPO Appropriate    Anesthesia Type: MAC.     - Reason for MAC: straight local not clinically adequate      Maintenance: TIVA.        Consents    Anesthesia Plan(s) and associated risks, benefits, and realistic alternatives discussed. Questions answered and patient/representative(s) expressed understanding.     - Discussed: Risks, Benefits and Alternatives for the PROCEDURE were discussed     - Discussed with:  Patient            Postoperative Care            Comments:                Esther Virk MD

## 2023-10-09 NOTE — PROGRESS NOTES
Care Suites Procedure Nursing Note    Patient Information  Name: Juancarlos Ma  Age: 68 year old    Procedure  Procedure: DCCV  Procedure start time: 1030  Procedure complete time: 1040  Concerns/abnormal assessment: none. Cardioverted with one shock at 100j to SR. VSS.  If abnormal assessment, provider notified: N/A  Plan/Other: monitor and discharge when stable    Serena Vaca RN

## 2023-10-09 NOTE — ANESTHESIA CARE TRANSFER NOTE
Patient: Juancarlos Ma    Procedure: Procedure(s):  cardioversion  Cardioversion External    Diagnosis: Atrial fibrillation, persistent (H) [I48.19]  Diagnosis Additional Information: No value filed.    Anesthesia Type:   MAC     Note:    Oropharynx: oropharynx clear of all foreign objects and spontaneously breathing  Level of Consciousness: awake  Oxygen Supplementation: nasal cannula  Level of Supplemental Oxygen (L/min / FiO2): 6  Independent Airway: airway patency satisfactory and stable  Dentition: dentition unchanged  Vital Signs Stable: post-procedure vital signs reviewed and stable  Report to RN Given: handoff report given  Patient transferred to: PACU  Comments: Diagnosis: Atrial Fibrillation.  Procedure: Cardioversion.  Cardiologist: Dr. Arredondo  Location: Care Suites 17  Handoff Report: Identifed the Patient, Identified the Reponsible Provider, Reviewed the pertinent medical history, Discussed the surgical course, Reviewed Intra-OP anesthesia mangement and issues during anesthesia, Set expectations for post-procedure period and Allowed opportunity for questions and acknowledgement of understanding      Vitals:  Vitals Value Taken Time   /78 10/09/23 1045   Temp     Pulse 66 10/09/23 1045   Resp 12 10/09/23 1045   SpO2 98 % 10/09/23 1045       Electronically Signed By: YAIR Martin CRNA  October 9, 2023  10:49 AM

## 2023-10-09 NOTE — PRE-PROCEDURE
GENERAL PRE-PROCEDURE:   Procedure:  Cardioversion  Date/Time:  10/9/2023 10:25 AM    Risks and benefits: Risks, benefits and alternatives were discussed    Consent given by:  Patient  Patient states understanding of procedure being performed: Yes    Patient's understanding of procedure matches consent: Yes    Procedure consent matches procedure scheduled: Yes    Expected level of sedation:  Deep  Appropriately NPO:  Yes  ASA Class:  2  Mallampati  :  Grade 2- soft palate, base of uvula, tonsillar pillars, and portion of posterior pharyngeal wall visible  Lungs:  Lungs clear with good breath sounds bilaterally  Heart:  Normal heart sounds and rate and a-flutter  History & Physical reviewed:  History and physical reviewed and no updates needed  Statement of review:  I have reviewed the lab findings, diagnostic data, medications, and the plan for sedation

## 2023-10-10 LAB
ATRIAL RATE - MUSE: 264 BPM
ATRIAL RATE - MUSE: 72 BPM
DIASTOLIC BLOOD PRESSURE - MUSE: NORMAL MMHG
DIASTOLIC BLOOD PRESSURE - MUSE: NORMAL MMHG
INTERPRETATION ECG - MUSE: NORMAL
INTERPRETATION ECG - MUSE: NORMAL
P AXIS - MUSE: 258 DEGREES
P AXIS - MUSE: 80 DEGREES
PR INTERVAL - MUSE: 180 MS
PR INTERVAL - MUSE: NORMAL MS
QRS DURATION - MUSE: 76 MS
QRS DURATION - MUSE: 82 MS
QT - MUSE: 328 MS
QT - MUSE: 386 MS
QTC - MUSE: 420 MS
QTC - MUSE: 422 MS
R AXIS - MUSE: 79 DEGREES
R AXIS - MUSE: 87 DEGREES
SYSTOLIC BLOOD PRESSURE - MUSE: NORMAL MMHG
SYSTOLIC BLOOD PRESSURE - MUSE: NORMAL MMHG
T AXIS - MUSE: 65 DEGREES
T AXIS - MUSE: 72 DEGREES
VENTRICULAR RATE- MUSE: 72 BPM
VENTRICULAR RATE- MUSE: 99 BPM

## 2023-10-20 ENCOUNTER — HOSPITAL ENCOUNTER (OUTPATIENT)
Dept: CARDIOLOGY | Facility: CLINIC | Age: 68
Discharge: HOME | End: 2023-10-20
Attending: INTERNAL MEDICINE
Payer: COMMERCIAL

## 2023-10-20 DIAGNOSIS — I48.19 PERSISTENT ATRIAL FIBRILLATION (H): ICD-10-CM

## 2023-10-20 DIAGNOSIS — I48.91 NEW ONSET ATRIAL FIBRILLATION (H): ICD-10-CM

## 2023-10-24 PROCEDURE — 93248 EXT ECG>7D<15D REV&INTERPJ: CPT | Performed by: INTERNAL MEDICINE

## 2023-10-29 DIAGNOSIS — I48.91 NEW ONSET ATRIAL FIBRILLATION (H): ICD-10-CM

## 2023-10-29 DIAGNOSIS — I10 ESSENTIAL HYPERTENSION, BENIGN: ICD-10-CM

## 2023-10-30 RX ORDER — OLMESARTAN MEDOXOMIL 20 MG/1
20 TABLET ORAL DAILY
Qty: 90 TABLET | Refills: 0 | Status: SHIPPED | OUTPATIENT
Start: 2023-10-30 | End: 2024-08-15

## 2023-10-30 RX ORDER — DILTIAZEM HYDROCHLORIDE 180 MG/1
180 CAPSULE, EXTENDED RELEASE ORAL DAILY
Qty: 90 CAPSULE | Refills: 0 | Status: SHIPPED | OUTPATIENT
Start: 2023-10-30 | End: 2023-11-30

## 2023-11-17 ENCOUNTER — CARE COORDINATION (OUTPATIENT)
Dept: CARDIOLOGY | Facility: CLINIC | Age: 68
End: 2023-11-17
Payer: COMMERCIAL

## 2023-11-17 NOTE — PROGRESS NOTES
reviewed ziopatch results.    Sharmaine Plascencia MD Sletteboe, Erin B., RN  Patient has gone back in to [facility]. Please refer to EP for consideration of ablation. If no availability with us, then please send to N.        I called pt to review ziopatch results. Pt said he feels fine and would prefer not to make a big deal out of the afib at this point. I explained to pt that his average HR is high so overtime if he were to sustain an high rate he may become more symptomatic. Pt said he hasn't had any worsening shortness of breath or fatigue. He did have one night he felt jittery while wearing the monitor. Pt has an 11/30 LOIDA visit and would prefer to just keep that visit if possible. He doesn't really want to go to the . No EP MD openings until February. I will message  to see if he wants pt to make any medication adjustments for better rate control. Pt does have a BP monitor at home, but hasn't been using it. His BP has tended to be high in clinic. Will check with  to see if he wants pt to make any med changes. Abdi BORGES November 17, 2023, 2:04 PM

## 2023-11-22 NOTE — TELEPHONE ENCOUNTER
I would still recommend EP consultation.  In the interim please increase diltiazem to 240 mg daily.

## 2023-11-24 NOTE — PROGRESS NOTES
I left message for pt to call back to review recommendations from . Abdi BORGES November 24, 2023, 4:27 PM

## 2023-11-27 NOTE — PROGRESS NOTES
Cardiology Clinic Progress Note  Juancarlos Ma MRN# [id number]   YOB: 1955 Age: 68 year old   Primary Cardiologist: Dr. Plascencia  Reason for visit: Follow up cardioversion             Assessment and Plan:       1.  Recurrent typical atrial flutter with variable block, [id number]   -8/2023 Echocardiogram showed normal EF 55%, normal right ventricular size and mildly decreased right ventricular systolic function, no valvular disease   -9/2023 discovered incidentally by PCP, initiated on diltiazem for rate control and Eliquis for anticoagulation  -10/2023 successful DCCV, recurrent atrial flutter (100% burden) on ziopatch monitor post DCCV, average HR 117bpm  -Diltiazem increased to 240mg daily, however patient has not done this  -Discussed the risks of continued atrial flutter with rapid rate, although patient is asymptomatic, including cardiomyopathy and heart failure symptoms and patient is agreeable to seeing electrophysiology for evaluation    2. Hypertension, controlled  -On olmesartan 20mg daily     3.  Hyperlipidemia  -2021     4. Alcohol abuse  -Drinking 6 beers daily    5. Possible sleep apnea  -Has upcoming evaluation with sleep medicine     Plan:   Continue eliquis uninterrupted   Continue olmesartan 20mg daily  Increase diltiazem to 240mg daily   Recommended tapering with goal of alcohol cessation  Keep appointment for outpatient sleep medicine evaluation on 12/20/23  Order placed for follow up with electrophysiology to discuss ablation vs rhythm control with medications    Follow up plan: Follow up with Dr. Dang 12/14/23         History of Presenting Illness:    Juancarlos Ma is a very pleasant 68 year old male with a history of hypertension, hyperlipidemia, COPD, prostate cancer, and atrial flutter.    Patient was seen for evaluation in Cardiology clinic by Dr. Plascencia in September 2023 after an EKG done by his PCP revealed atrail flutter with variable block. He was asymptomatic with  this. He was started on diltiazem and eliquis.     Patient underwent a cardioversion on 10/9/2023 with restoration of sinus rhythm, he subsequently wore a Zio patch monitor which showed he was back in atrial flutter with an average rate of 117bpm.  Dr. Plascencia recommended an EP referral to discuss a possible ablation.  This was discussed with the patient, however he declined feeling significantly symptomatic while he was in atrial fibrillation and preferred to keep his visit with me today without medication changes.  Dr. Plascencia increased his diltiazem to 240 mg daily and continued to recommend EP evaluation.    Patient is here today for a post cardioversion follow up. EKG today showing variable atrial flutter with a rate of 127bpm.     Patient reports feeling good. 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     Patient denies chest pain or chest tightness. Denies dizziness, lightheadedness or other presyncopal symptoms. Denies tachycardia or palpitations.     Blood pressure 119/83 and  on EKG in clinic today. HR 66 on automated HR monitor.     Denies any bleeding issues with eliquis.     Alcohol: 6 beers/day   Tobacco: Quit 6 years ago, uses marijuana occasionally         Recent Hospitalizations   None in            Social History      Social History     Socioeconomic History    Marital status: Single     Spouse name: Not on file    Number of children: Not on file    Years of education: Not on file    Highest education level: Not on file   Occupational History     Employer: [employer]   Tobacco Use    Smoking status: Former     Packs/day: 0.50     Years: 40.00     Additional pack years: 0.00     Total pack years: 20.00     Types: Cigarettes     Quit date: 2014     Years since quittin.0    Smokeless tobacco: Never    Tobacco comments:     quit    Vaping Use    Vaping Use: Never used   Substance and Sexual Activity     "Alcohol use: Yes     Alcohol/week: 16.7 standard drinks of alcohol     Types: 20 Cans of beer per week    Drug use: Yes     Types: Marijuana    Sexual activity: Never   Other Topics Concern    Parent/sibling w/ CABG, MI or angioplasty before 65F 55M? Not Asked   Social History Narrative    Not on file     Social Determinants of Health     Financial Resource Strain: Not on file   Food Insecurity: Not on file   Transportation Needs: Not on file   Physical Activity: Not on file   Stress: Not on file   Social Connections: Not on file   Interpersonal Safety: Not on file   Housing Stability: Not on file            Review of Systems:   Skin:        Eyes:       ENT:       Respiratory:  Positive for dyspnea on exertion;cough  Cardiovascular:  Negative for;lightheadedness;dizziness;syncope or near-syncope;edema;fatigue;palpitations;chest pain Positive for  Gastroenterology:      Genitourinary:       Musculoskeletal:       Neurologic:       Psychiatric:       Heme/Lymph/Imm:  Positive for allergies  Endocrine:  Negative thyroid disorder;diabetes         Physical Exam:   Vitals: /83   Pulse 66   Ht 1.664 m (5' 5.5\")   Wt 88.1 kg (194 lb 3.2 oz)   SpO2 96%   BMI 31.83 kg/m     Wt Readings from Last 4 Encounters:   11/30/23 88.1 kg (194 lb 3.2 oz)   10/09/23 86.2 kg (190 lb)   09/13/23 88.5 kg (195 lb)   08/09/23 86.6 kg (191 lb)     GEN: well nourished, in no acute distress.  HEENT:  Pupils equal, round. Sclerae nonicteric.   NECK: Supple, no masses appreciated. No JVD with patient supine.  C/V:  Irregularly irrgular rate and rhythm, no murmur, rub or gallop.    RESP: Respirations are unlabored. Clear to auscultation bilaterally without wheezing, rales, or rhonchi.  GI: Abdomen soft, nontender.  EXTREM: No LE edema.  NEURO: Alert and oriented, cooperative.  SKIN: Warm and dry.        Data:       LIPID RESULTS:  Lab Results   Component Value Date    CHOL 191 11/18/2021    CHOL 207 (H) 11/20/2020    HDL 72 11/18/2021    " "HDL 71 11/20/2020     (H) 11/18/2021     (H) 11/20/2020    TRIG 60 11/18/2021    TRIG 72 11/20/2020    CHOLHDLRATIO 2.8 02/23/2015     LIVER ENZYME RESULTS:  Lab Results   Component Value Date    AST 24 08/09/2023    AST 8 06/15/2019    ALT 17 08/09/2023    ALT 17 06/15/2019     CBC RESULTS:  Lab Results   Component Value Date    WBC 7.4 08/09/2023    WBC 4.6 11/20/2020    RBC 4.65 08/09/2023    RBC 4.28 (L) 11/20/2020    HGB 15.1 08/09/2023    HGB 13.9 11/20/2020    HCT 44.5 08/09/2023    HCT 40.8 11/20/2020    MCV 96 08/09/2023    MCV 95 11/20/2020    MCH 32.5 08/09/2023    MCH 32.5 11/20/2020    MCHC 33.9 08/09/2023    MCHC 34.1 11/20/2020    RDW 12.9 08/09/2023    RDW 12.3 11/20/2020     08/09/2023     11/20/2020     BMP RESULTS:  Lab Results   Component Value Date     08/09/2023     (L) 11/20/2020    POTASSIUM 4.7 10/09/2023    POTASSIUM 4.5 08/02/2022    POTASSIUM 3.7 11/20/2020    CHLORIDE 100 08/09/2023    CHLORIDE 100 08/02/2022    CHLORIDE 95 11/20/2020    CO2 28 08/09/2023    CO2 22 08/02/2022    CO2 32 11/20/2020    ANIONGAP 12 08/09/2023    ANIONGAP 11 08/02/2022    ANIONGAP 4 11/20/2020    GLC 92 08/09/2023    GLC 94 08/02/2022    GLC 99 11/20/2020    BUN 20.9 08/09/2023    BUN 19 08/02/2022    BUN 21 11/20/2020    CR 1.27 (H) 08/09/2023    CR 1.09 11/20/2020    GFRESTIMATED 61 08/09/2023    GFRESTIMATED 70 11/20/2020    GFRESTBLACK 81 11/20/2020    HARDY 10.1 08/09/2023    HARDY 9.8 11/20/2020      A1C RESULTS:  No results found for: \"A1C\"  INR RESULTS:  Lab Results   Component Value Date    INR 1.20 (H) 06/17/2019    INR 0.95 02/06/2014            Medications     Current Outpatient Medications   Medication Sig Dispense Refill    apixaban ANTICOAGULANT (ELIQUIS) 5 MG tablet Take 1 tablet (5 mg) by mouth 2 times daily 180 tablet 3    diltiazem ER (DILT-XR) 240 MG 24 hr ER beaded capsule Take 1 capsule (240 mg) by mouth daily 30 capsule 3    olmesartan (BENICAR) 20 MG " tablet Take 1 tablet by mouth once daily 90 tablet 0    albuterol (PROAIR HFA/PROVENTIL HFA/VENTOLIN HFA) 108 (90 Base) MCG/ACT inhaler Inhale 2 puffs into the lungs every 4 hours as needed for shortness of breath, wheezing or cough (Patient not taking: Reported on 9/13/2023) 18 g 5    albuterol (PROAIR HFA/PROVENTIL HFA/VENTOLIN HFA) 108 (90 Base) MCG/ACT inhaler Inhale 2 puffs into the lungs every 6 hours (Patient not taking: Reported on 8/9/2023) 18 g 5    sildenafil (VIAGRA) 100 MG tablet Take 0.5-1 tablets ( mg) by mouth daily as needed (erectile dysfunction) Take 30 min to 4 hours before intercourse.  Never use with nitroglycerin, terazosin or doxazosin. (Patient not taking: Reported on 8/9/2023) 30 tablet 5          Past Medical History     Past Medical History:   Diagnosis Date    Diverticulosis of colon 10/2/12    incidental sigmoid diverticulosis seen on routine colonoscopy, no h/o diverticulitis    Essential hypertension, benign     Fracture of fifth metacarpal bone of left hand 11/07/2017    intra-articular left 5th metacarpal base fracture with minimally dispalced left radial styloid fracture; no operative management    Prostate cancer (H) 2013    S/P partial colectomy 07/24/2019    s/p robotic assisted sigmoid colectomy for recurrent diverticulitis    Serrated adenoma of colon 10/2/12    2 sessile serrated adenomatous polyps removed at colonoscopy; 1 tubular adenoma     Past Surgical History:   Procedure Laterality Date    ANESTHESIA CARDIOVERSION N/A 10/9/2023    Procedure: cardioversion;  Surgeon: GENERIC ANESTHESIA PROVIDER;  Location:  OR    DAVINCI PROSTATECTOMY  6/10/2013    Procedure: DAVINCI PROSTATECTOMY;  ROBOTIC ASSIST PROSTATECTOMY;  Surgeon: Damon Jarrell MD;  Location: [facility] OR    DAVINCI XI ASSISTED RESECTION RECTOSIGMOID N/A 7/24/2019    Procedure: ROBOTIC ASSISTED SIGMOID COLECTOMY, ROBOT ASSISTED SMALL BOWEL RESECTION, ROBOT ASSISTED MOBILIZATION OF THE SPLENIC FLEXURE;   Surgeon: Neel Grimes MD;  Location:  OR     TOOTH EXTRACTION W/FORCEP      4 wisdom teeth removed without complication     Family History   Problem Relation Age of Onset    Hypertension Father          of cancer     Cancer Father         father  lymphoma at age 68    Respiratory Mother          at age 72 from silicosis, COPD, smoking    Diabetes Brother         type II DM    Family History Negative Sister     Family History Negative Brother             Allergies   Lisinopril and Penicillin [penicillins]        Renetta An NP  [facility]  Pager: [phone]

## 2023-11-30 ENCOUNTER — OFFICE VISIT (OUTPATIENT)
Dept: CARDIOLOGY | Facility: CLINIC | Age: 68
End: 2023-11-30
Attending: INTERNAL MEDICINE
Payer: COMMERCIAL

## 2023-11-30 VITALS
HEIGHT: 66 IN | SYSTOLIC BLOOD PRESSURE: 119 MMHG | HEART RATE: 66 BPM | OXYGEN SATURATION: 96 % | WEIGHT: 194.2 LBS | BODY MASS INDEX: 31.21 KG/M2 | DIASTOLIC BLOOD PRESSURE: 83 MMHG

## 2023-11-30 DIAGNOSIS — I10 ESSENTIAL HYPERTENSION, BENIGN: ICD-10-CM

## 2023-11-30 DIAGNOSIS — I48.91 NEW ONSET ATRIAL FIBRILLATION (H): ICD-10-CM

## 2023-11-30 DIAGNOSIS — I48.19 PERSISTENT ATRIAL FIBRILLATION (H): ICD-10-CM

## 2023-11-30 PROCEDURE — 93000 ELECTROCARDIOGRAM COMPLETE: CPT | Performed by: NURSE PRACTITIONER

## 2023-11-30 PROCEDURE — 99214 OFFICE O/P EST MOD 30 MIN: CPT | Performed by: NURSE PRACTITIONER

## 2023-11-30 RX ORDER — DILTIAZEM HYDROCHLORIDE 240 MG/1
240 CAPSULE, EXTENDED RELEASE ORAL DAILY
Qty: 30 CAPSULE | Refills: 3 | Status: SHIPPED | OUTPATIENT
Start: 2023-11-30 | End: 2023-12-14

## 2023-11-30 NOTE — LETTER
11/30/2023    Wili Arevalo MD  [address] [facility] [address]    RE: Juancarlos Ma       Dear Colleague,     I had the pleasure of seeing Juancarlos Ma in the [facility].    Cardiology Clinic Progress Note  Juancarlos Ma MRN# [id number]   YOB: 1955 Age: 68 year old   Primary Cardiologist: Dr. Plascencia  Reason for visit: Follow up cardioversion             Assessment and Plan:       1.  Recurrent typical atrial flutter with variable block, [id number]   -8/2023 Echocardiogram showed normal EF 55%, normal right ventricular size and mildly decreased right ventricular systolic function, no valvular disease   -9/2023 discovered incidentally by PCP, initiated on diltiazem for rate control and Eliquis for anticoagulation  -10/2023 successful DCCV, recurrent atrial flutter (100% burden) on ziopatch monitor post DCCV, average HR 117bpm  -Diltiazem increased to 240mg daily, however patient has not done this  -Discussed the risks of continued atrial flutter with rapid rate, although patient is asymptomatic, including cardiomyopathy and heart failure symptoms and patient is agreeable to seeing electrophysiology for evaluation    2. Hypertension, controlled  -On olmesartan 20mg daily     3.  Hyperlipidemia  -2021     4. Alcohol abuse  -Drinking 6 beers daily    5. Possible sleep apnea  -Has upcoming evaluation with sleep medicine     Plan:   Continue eliquis uninterrupted   Continue olmesartan 20mg daily  Increase diltiazem to 240mg daily   Recommended tapering with goal of alcohol cessation  Keep appointment for outpatient sleep medicine evaluation on 12/20/23  Order placed for follow up with electrophysiology to discuss ablation vs rhythm control with medications    Follow up plan: Follow up with Dr. Dang 12/14/23         History of Presenting Illness:    Juancarlos Ma is a very pleasant 68 year old male with a history of hypertension, hyperlipidemia, COPD, prostate cancer,  and atrial flutter.    Patient was seen for evaluation in Cardiology clinic by Dr. Plascencia in September 2023 after an EKG done by his PCP revealed atrail flutter with variable block. He was asymptomatic with this. He was started on diltiazem and eliquis.     Patient underwent a cardioversion on 10/9/2023 with restoration of sinus rhythm, he subsequently wore a Zio patch monitor which showed he was back in atrial flutter with an average rate of 117bpm.  Dr. Plascencia recommended an EP referral to discuss a possible ablation.  This was discussed with the patient, however he declined feeling significantly symptomatic while he was in atrial fibrillation and preferred to keep his visit with me today without medication changes.  Dr. Plascencia increased his diltiazem to 240 mg daily and continued to recommend EP evaluation.    Patient is here today for a post cardioversion follow up. EKG today showing variable atrial flutter with a rate of 127bpm.     Patient reports feeling good. 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     Patient denies chest pain or chest tightness. Denies dizziness, lightheadedness or other presyncopal symptoms. Denies tachycardia or palpitations.     Blood pressure 119/83 and  on EKG in clinic today. HR 66 on automated HR monitor.     Denies any bleeding issues with eliquis.     Alcohol: 6 beers/day   Tobacco: Quit 6 years ago, uses marijuana occasionally         Recent Hospitalizations   None in 2023           Social History      Social History     Socioeconomic History    Marital status: Single     Spouse name: Not on file    Number of children: Not on file    Years of education: Not on file    Highest education level: Not on file   Occupational History     Employer: [employer]   Tobacco Use    Smoking status: Former     Packs/day: 0.50     Years: 40.00     Additional pack years: 0.00     Total pack years: 20.00     Types: Cigarettes  "    Quit date: 2014     Years since quittin.0    Smokeless tobacco: Never    Tobacco comments:     quit    Vaping Use    Vaping Use: Never used   Substance and Sexual Activity    Alcohol use: Yes     Alcohol/week: 16.7 standard drinks of alcohol     Types: 20 Cans of beer per week    Drug use: Yes     Types: Marijuana    Sexual activity: Never   Other Topics Concern    Parent/sibling w/ CABG, MI or angioplasty before 65F 55M? Not Asked   Social History Narrative    Not on file     Social Determinants of Health     Financial Resource Strain: Not on file   Food Insecurity: Not on file   Transportation Needs: Not on file   Physical Activity: Not on file   Stress: Not on file   Social Connections: Not on file   Interpersonal Safety: Not on file   Housing Stability: Not on file            Review of Systems:   Skin:        Eyes:       ENT:       Respiratory:  Positive for dyspnea on exertion;cough  Cardiovascular:  Negative for;lightheadedness;dizziness;syncope or near-syncope;edema;fatigue;palpitations;chest pain Positive for  Gastroenterology:      Genitourinary:       Musculoskeletal:       Neurologic:       Psychiatric:       Heme/Lymph/Imm:  Positive for allergies  Endocrine:  Negative thyroid disorder;diabetes         Physical Exam:   Vitals: /83   Pulse 66   Ht 1.664 m (5' 5.5\")   Wt 88.1 kg (194 lb 3.2 oz)   SpO2 96%   BMI 31.83 kg/m     Wt Readings from Last 4 Encounters:   23 88.1 kg (194 lb 3.2 oz)   10/09/23 86.2 kg (190 lb)   23 88.5 kg (195 lb)   23 86.6 kg (191 lb)     GEN: well nourished, in no acute distress.  HEENT:  Pupils equal, round. Sclerae nonicteric.   NECK: Supple, no masses appreciated. No JVD with patient supine.  C/V:  Irregularly irrgular rate and rhythm, no murmur, rub or gallop.    RESP: Respirations are unlabored. Clear to auscultation bilaterally without wheezing, rales, or rhonchi.  GI: Abdomen soft, nontender.  EXTREM: No LE " "edema.  NEURO: Alert and oriented, cooperative.  SKIN: Warm and dry.        Data:       LIPID RESULTS:  Lab Results   Component Value Date    CHOL 191 11/18/2021    CHOL 207 (H) 11/20/2020    HDL 72 11/18/2021    HDL 71 11/20/2020     (H) 11/18/2021     (H) 11/20/2020    TRIG 60 11/18/2021    TRIG 72 11/20/2020    CHOLHDLRATIO 2.8 02/23/2015     LIVER ENZYME RESULTS:  Lab Results   Component Value Date    AST 24 08/09/2023    AST 8 06/15/2019    ALT 17 08/09/2023    ALT 17 06/15/2019     CBC RESULTS:  Lab Results   Component Value Date    WBC 7.4 08/09/2023    WBC 4.6 11/20/2020    RBC 4.65 08/09/2023    RBC 4.28 (L) 11/20/2020    HGB 15.1 08/09/2023    HGB 13.9 11/20/2020    HCT 44.5 08/09/2023    HCT 40.8 11/20/2020    MCV 96 08/09/2023    MCV 95 11/20/2020    MCH 32.5 08/09/2023    MCH 32.5 11/20/2020    MCHC 33.9 08/09/2023    MCHC 34.1 11/20/2020    RDW 12.9 08/09/2023    RDW 12.3 11/20/2020     08/09/2023     11/20/2020     BMP RESULTS:  Lab Results   Component Value Date     08/09/2023     (L) 11/20/2020    POTASSIUM 4.7 10/09/2023    POTASSIUM 4.5 08/02/2022    POTASSIUM 3.7 11/20/2020    CHLORIDE 100 08/09/2023    CHLORIDE 100 08/02/2022    CHLORIDE 95 11/20/2020    CO2 28 08/09/2023    CO2 22 08/02/2022    CO2 32 11/20/2020    ANIONGAP 12 08/09/2023    ANIONGAP 11 08/02/2022    ANIONGAP 4 11/20/2020    GLC 92 08/09/2023    GLC 94 08/02/2022    GLC 99 11/20/2020    BUN 20.9 08/09/2023    BUN 19 08/02/2022    BUN 21 11/20/2020    CR 1.27 (H) 08/09/2023    CR 1.09 11/20/2020    GFRESTIMATED 61 08/09/2023    GFRESTIMATED 70 11/20/2020    GFRESTBLACK 81 11/20/2020    HARDY 10.1 08/09/2023    HARDY 9.8 11/20/2020      A1C RESULTS:  No results found for: \"A1C\"  INR RESULTS:  Lab Results   Component Value Date    INR 1.20 (H) 06/17/2019    INR 0.95 02/06/2014            Medications     Current Outpatient Medications   Medication Sig Dispense Refill    apixaban ANTICOAGULANT " (ELIQUIS) 5 MG tablet Take 1 tablet (5 mg) by mouth 2 times daily 180 tablet 3    diltiazem ER (DILT-XR) 240 MG 24 hr ER beaded capsule Take 1 capsule (240 mg) by mouth daily 30 capsule 3    olmesartan (BENICAR) 20 MG tablet Take 1 tablet by mouth once daily 90 tablet 0    albuterol (PROAIR HFA/PROVENTIL HFA/VENTOLIN HFA) 108 (90 Base) MCG/ACT inhaler Inhale 2 puffs into the lungs every 4 hours as needed for shortness of breath, wheezing or cough (Patient not taking: Reported on 9/13/2023) 18 g 5    albuterol (PROAIR HFA/PROVENTIL HFA/VENTOLIN HFA) 108 (90 Base) MCG/ACT inhaler Inhale 2 puffs into the lungs every 6 hours (Patient not taking: Reported on 8/9/2023) 18 g 5    sildenafil (VIAGRA) 100 MG tablet Take 0.5-1 tablets ( mg) by mouth daily as needed (erectile dysfunction) Take 30 min to 4 hours before intercourse.  Never use with nitroglycerin, terazosin or doxazosin. (Patient not taking: Reported on 8/9/2023) 30 tablet 5          Past Medical History     Past Medical History:   Diagnosis Date    Diverticulosis of colon 10/2/12    incidental sigmoid diverticulosis seen on routine colonoscopy, no h/o diverticulitis    Essential hypertension, benign     Fracture of fifth metacarpal bone of left hand 11/07/2017    intra-articular left 5th metacarpal base fracture with minimally dispalced left radial styloid fracture; no operative management    Prostate cancer (H) 2013    S/P partial colectomy 07/24/2019    s/p robotic assisted sigmoid colectomy for recurrent diverticulitis    Serrated adenoma of colon 10/2/12    2 sessile serrated adenomatous polyps removed at colonoscopy; 1 tubular adenoma     Past Surgical History:   Procedure Laterality Date    ANESTHESIA CARDIOVERSION N/A 10/9/2023    Procedure: cardioversion;  Surgeon: GENERIC ANESTHESIA PROVIDER;  Location:  OR    DAVINCI PROSTATECTOMY  6/10/2013    Procedure: DAVINCI PROSTATECTOMY;  ROBOTIC ASSIST PROSTATECTOMY;  Surgeon: Damon Jarrell MD;   Location: [facility] OR    DAVINCI XI ASSISTED RESECTION RECTOSIGMOID N/A 2019    Procedure: ROBOTIC ASSISTED SIGMOID COLECTOMY, ROBOT ASSISTED SMALL BOWEL RESECTION, ROBOT ASSISTED MOBILIZATION OF THE SPLENIC FLEXURE;  Surgeon: Neel Grimes MD;  Location:  OR    HC TOOTH EXTRACTION W/FORCEP      4 wisdom teeth removed without complication     Family History   Problem Relation Age of Onset    Hypertension Father          of cancer     Cancer Father         father  lymphoma at age 68    Respiratory Mother          at age 72 from silicosis, COPD, smoking    Diabetes Brother         type II DM    Family History Negative Sister     Family History Negative Brother             Allergies   Lisinopril and Penicillin [penicillins]        Renetta An NP  [facility]  Pager: [phone]       Thank you for allowing me to participate in the care of your patient.      Sincerely,     Renetta An NP     [facility]  cc:   Sharmaine Plascencia MD  [address]  [place] [address]

## 2023-11-30 NOTE — PATIENT INSTRUCTIONS
Today's Recommendations    Increase your diltiazem to 240mg daily   Keep taking your eliquis without missing any doses  Continue your olmesartan 20mg   I recommend you reduce and taper your alcohol intake   If you experience any new/change in symptoms, you need to be seen in the ER  I recommend you keep your appointment for the sleep medicine consultation  Please follow up with electrophysiology as soon as we can get you in.    Please send a [employer] message or call [phone] to the RN team with questions or concerns.     Scheduling number [phone]  YAIR Freire, CNP

## 2023-12-13 NOTE — PROGRESS NOTES
I am delighted to see Juancarlos Ma as a new patient in [place] [facility] for evaluation of atrial flutter.    History of Present Illness:  The patient is a 68 year old  male who was seen in PCP office for annual wellness exam, noted to have  (/60). EKG showed atrial flutter. Unknown duration - last seen by PCP 7/2022 at which time HR was 65 bpm (no EKGs).  Diltiazem and apixaban were started, cardioverion 10/9/2023 restored sinus. A ziopatch was placed 10/24/2023 and by the time he was back in atrial flutter persistently, with average  bpm. His diltiazem was increased from 120 mg to 240 mg and he was referred for possible ablation.    Mr. Ma is not thrilled to here today. He is planning on going to [place] the day after [place] to meet up with his sister for 3 months. He does this every year. He was told he had to see me before he leaves and he is anxious that he will not be able to go. He is completely asymptomatic. He has no palpitations, he is very active without any chest discomfort or dyspnea.      Past Medical History:  Atrial flutter, incidentally detected in office 8/2023, CV 10/9/2023, recurrence  Hypertension  Prostate CA  EtOH 6 beers/day  Former cigarette use  Marijuana     Medications:   Apixaban 5 mg bid  Diltiazem  mg every day  Olmesartan 20 mg every day    Allergies:    Allergies   Allergen Reactions    Lisinopril      Cough with Lisinopril    Penicillin [Penicillins] Hives       Physical examination  Vitals: 152/74, HR 74; manual right arm by me 120/60  BMI= 31 (89 kg)    Constitutional: In general, the patient is a pleasant male in no acute distress.    Cardiovascular: Carotids +2/2 bilaterally without bruits.  No jugular venous distension. Irregular. Normal S1, S2. No murmur, rub, click, or gallop.   Extremities: Pulses are normal bilaterally throughout. No peripheral edema.  Respiratory: Clear to asculation.  No ronchi, wheezes, rales.  No dullness to  percussion.     I have personally and independently reviewed the following:  Labs:   2023: 1.27, TSH 5.16, hgb 15, plt 248K     Echo 2023: (AFL HR 95): EF 55%, TANI 26    EK2023: atrial flutter 127 bpm, CTI-CCW  10/9/2023 post CV: sinus 72 bpm  2023: atrial flutter, 133 bpm  2019: sinus    Patch monitor 10/: persistent atrial flutter, average 117 bpm    Assessment :  Recurrent persistent atrial flutter, appears to be CTI-dep CCW.  His rate today is in the 80s after increase in diltiazem to 240 mg every day  [id number] score is 2 for HTN and age.  He is on the appropriate apixaban dose of 5 bid.  He is completely asymptomatic.    I see no reason he cannot go to [place] as planned. I did discuss medications vs ablations with him. If his HR can be controlled and he is asymptomatic, then he has the option of rhythm vs rate control.    I will see him back in April after he returns from [place].  He wishes to go to [facility] as he lives closer to the [facility].          I spent a total of 30 minutes face to face with  Juancarlos Ma during today's office visit. I have spend an additional 20 minutes today on chart review and documentation.      The patient is to return as above . The patient understood the treatment plan as outlined above.  There were no barriers to learning.      Analilia Dang MD

## 2023-12-14 ENCOUNTER — OFFICE VISIT (OUTPATIENT)
Dept: CARDIOLOGY | Facility: CLINIC | Age: 68
End: 2023-12-14
Attending: NURSE PRACTITIONER
Payer: COMMERCIAL

## 2023-12-14 VITALS
HEART RATE: 80 BPM | OXYGEN SATURATION: 96 % | DIASTOLIC BLOOD PRESSURE: 74 MMHG | BODY MASS INDEX: 32.28 KG/M2 | SYSTOLIC BLOOD PRESSURE: 152 MMHG | WEIGHT: 197 LBS

## 2023-12-14 DIAGNOSIS — I48.3 TYPICAL ATRIAL FLUTTER (H): ICD-10-CM

## 2023-12-14 DIAGNOSIS — I10 ESSENTIAL HYPERTENSION, BENIGN: ICD-10-CM

## 2023-12-14 PROCEDURE — 99215 OFFICE O/P EST HI 40 MIN: CPT | Performed by: INTERNAL MEDICINE

## 2023-12-14 RX ORDER — DILTIAZEM HYDROCHLORIDE 240 MG/1
240 CAPSULE, EXTENDED RELEASE ORAL DAILY
Qty: 90 CAPSULE | Refills: 3 | Status: SHIPPED | OUTPATIENT
Start: 2023-12-14

## 2023-12-14 ASSESSMENT — PAIN SCALES - GENERAL: PAINLEVEL: NO PAIN (0)

## 2023-12-14 NOTE — PATIENT INSTRUCTIONS
Take your medicines every day, as directed     Changes made today:  None          Diagnosis: Atrial flutter    Follow up after April 2024 at [facility]       Cardiology Care Coordinators:      Pattie MERCADO RN     Cardiology Rooming Staff:  Neha FENG    Phone  [phone]      Fax [phone]    To Contact us     During Business Hours:  [phone]     If you are needing refills please contact your pharmacy.     For urgent after hour care please call the [facility] at [phone] or the [facility] at [phone] and ask to speak to the cardiologist on call.    If you are having a medical emergency, please call 911.            HOW TO CHECK YOUR BLOOD PRESSURE AT HOME:     Avoid eating, smoking, and exercising for at least 30 minutes before taking a reading.     Be sure you have taken your BP medication at least 2-3 hours before you check it.      Sit quietly for 10 minutes before a reading.      Sit in a chair with your feet flat on the floor. Rest your  arm on a table so that the arm cuff is at the same level as your heart.     Remain still during the reading.  Record your blood pressure and pulse in a log and bring to your next appointment.       Use [employer] allows you to communicate directly with your heart team through secure messaging.  [place] can be accessed any time on your phone, computer, or tablet.  If you need assistance, we'd be happy to help!             Keep your Heart Appointments:

## 2024-01-23 DIAGNOSIS — I10 ESSENTIAL HYPERTENSION, BENIGN: ICD-10-CM

## 2024-01-24 NOTE — TELEPHONE ENCOUNTER
"Receiving refill request from the pharmacy for Olmesartan 40 mg tablets. However, upon chart review, dose that is active on patient's medication list is 20 mg tablets.     \"olmesartan (BENICAR) 20 MG tablet\"    Will route to the provider for review.     Marilynn Patterson RN  "

## 2024-01-26 RX ORDER — OLMESARTAN MEDOXOMIL 20 MG/1
20 TABLET ORAL DAILY
Qty: 90 TABLET | Refills: 1 | Status: SHIPPED | OUTPATIENT
Start: 2024-01-26 | End: 2024-07-08

## 2024-02-19 ENCOUNTER — PATIENT OUTREACH (OUTPATIENT)
Dept: GASTROENTEROLOGY | Facility: CLINIC | Age: 69
End: 2024-02-19
Payer: COMMERCIAL

## 2024-03-27 ENCOUNTER — TELEPHONE (OUTPATIENT)
Dept: INTERNAL MEDICINE | Facility: CLINIC | Age: 69
End: 2024-03-27
Payer: COMMERCIAL

## 2024-03-27 NOTE — TELEPHONE ENCOUNTER
BCBS     Diagnosis:  Breast, Prostate, and Other Cancers and Tumors PSA 1.59 Aug 2023 Urology followup recommended     Appointment:  Last office visit with PCP: Aug 2023  Due for: Annual Wellness Visit Aug 2024     Juancarlos Acevedo RN

## 2024-04-01 ENCOUNTER — TELEPHONE (OUTPATIENT)
Dept: CARDIOLOGY | Facility: CLINIC | Age: 69
End: 2024-04-01
Payer: COMMERCIAL

## 2024-04-01 NOTE — TELEPHONE ENCOUNTER
Left Voicemail (1st Attempt) and Sent Mychart (1st Attempt) for the patient to call back and schedule the following:    Appointment type: Return EP  Provider: Dr. Dang  Return date: April 2024 (Next available at [facility] is mid-August, [facility] has July appts)  Specialty phone number: [phone] option 1  Additional appointment(s) needed: NA  Additonal Notes: 4/1 LVM and MYC for pt to schedule Return EP w/ Dr. Dang. TRAVIS Gee 4/1/2024

## 2024-04-01 NOTE — TELEPHONE ENCOUNTER
----- Message from Mary Renteria RN sent at 4/1/2024  8:39 AM CDT -----  Regarding: FW: EP follow-up    ----- Message -----  From: Neha Hairston  Sent: 4/1/2024   8:35 AM CDT  To: Cardiology General   Subject: FW: EP follow-up                                   ----- Message -----  From: Ryann Houser RN  Sent: 12/21/2023  10:08 AM CDT  To: [facility] Adult [place]  Subject: EP follow-up                                     Hi team     Please call pt to schedule EP follow-up around April 2024 (pt prefers [facility]) when back from [place].       Thanks !

## 2024-04-02 NOTE — TELEPHONE ENCOUNTER
Call attempt 1/3.    LVM for patient to schedule annual wellness exam due August 2024. Please include RN Diagnosis notes from open encounter to the appointment notes of scheduled appointment.    Please close encounter when scheduled.

## 2024-04-03 NOTE — TELEPHONE ENCOUNTER
Left Voicemail (2nd Attempt) MAX ATTEMPTS REACHED- sent letter (MYC hasn't been accessed in a couple months) for the patient to call back and schedule the following:    Appointment type: Return EP  Provider: Dr. Analilia Dang  Return date: April 2024 (Next available at [facility] is yli-ax-rbyj-August, [facility] has late July appts)  Specialty phone number: [phone] option 1  Additional appointment(s) needed: NA  Additonal Notes: 4/3 MAX ATTEMPTS REACHED- LVM and sent letter for pt to schedule Return EP w/ Dr. Dang. TRAVIS Gee 4/3/2024

## 2024-05-06 ENCOUNTER — TELEPHONE (OUTPATIENT)
Dept: CARDIOLOGY | Facility: CLINIC | Age: 69
End: 2024-05-06
Payer: COMMERCIAL

## 2024-05-06 NOTE — TELEPHONE ENCOUNTER
5/6/2024 10:50AM Carmel Gee  Patient confirmed scheduled appointment:  Date: 9/4/2024  Time: 1:45PM  Visit type: New EP  Provider: Dr. Pugh  Location: [facility],[address] [place] [address], [address] [address]   Testing/imaging: NA  Additional notes: 5/6 Called to schedule Return EP w/ Dr. Dang in [place]- pt declined scheduling at [facility] and  and wanted to see a provider closer to home. Scheduled New EP w/ Dr. Pugh in  9/4. TRAVIS Gee 5/6/2024 10:50AM

## 2024-05-06 NOTE — TELEPHONE ENCOUNTER
----- Message from Ryann Houser RN sent at 4/3/2024 12:45 PM CDT -----  Regarding: RE: EP follow-up  Thanks Carmel for all the efforts. I will set a reminder to check back in 1 month.           ----- Message -----  From: Carmel Garber  Sent: 4/3/2024  10:44 AM CDT  To: Mary Renteria RN; Neha Hairston; #  Subject: RE: EP follow-up                                 Hi everyone    Max attempts have been made to contact this pt. Sent letter today since MYC hasn't been accessed in a couple months. Next available for Dr. Dang in  is late July and at the [facility] is hmj-wa-egsr- August. Let me know if there's anything else I can do for this pt.    Thank you,    Carmel Gee    ----- Message -----  From: Mary Renteria RN  Sent: 4/1/2024   8:40 AM CDT  To: Clinic Coordinators-Card-  Subject: FW: EP follow-up                                   ----- Message -----  From: Neha Hairston  Sent: 4/1/2024   8:35 AM CDT  To: Cardiology General   Subject: FW: EP follow-up                                   ----- Message -----  From: Ryann Houser RN  Sent: 12/21/2023  10:08 AM CDT  To: [facility] Cardiology Adult [place]  Subject: EP follow-up                                     Hi team     Please call pt to schedule EP follow-up around April 2024 (pt prefers [facility]) when back from [place].       Thanks !

## 2024-07-07 DIAGNOSIS — I10 ESSENTIAL HYPERTENSION, BENIGN: ICD-10-CM

## 2024-07-08 RX ORDER — OLMESARTAN MEDOXOMIL 20 MG/1
20 TABLET ORAL DAILY
Qty: 90 TABLET | Refills: 0 | Status: SHIPPED | OUTPATIENT
Start: 2024-07-08 | End: 2024-08-15

## 2024-07-08 NOTE — TELEPHONE ENCOUNTER
Prescription approved per [facility].  Vivian Bai RN  [facility]    
never

## 2024-07-23 DIAGNOSIS — I48.91 NEW ONSET ATRIAL FIBRILLATION (H): ICD-10-CM

## 2024-08-07 ENCOUNTER — APPOINTMENT (OUTPATIENT)
Dept: URBAN - METROPOLITAN AREA CLINIC 256 | Age: 69
Setting detail: DERMATOLOGY
End: 2024-08-07

## 2024-08-07 DIAGNOSIS — L84 CORNS AND CALLOSITIES: ICD-10-CM

## 2024-08-07 DIAGNOSIS — L57.0 ACTINIC KERATOSIS: ICD-10-CM

## 2024-08-07 DIAGNOSIS — L82.1 OTHER SEBORRHEIC KERATOSIS: ICD-10-CM

## 2024-08-07 DIAGNOSIS — D22 MELANOCYTIC NEVI: ICD-10-CM

## 2024-08-07 DIAGNOSIS — L57.8 OTHER SKIN CHANGES DUE TO CHRONIC EXPOSURE TO NONIONIZING RADIATION: ICD-10-CM

## 2024-08-07 DIAGNOSIS — D18.0 HEMANGIOMA: ICD-10-CM

## 2024-08-07 DIAGNOSIS — Z71.89 OTHER SPECIFIED COUNSELING: ICD-10-CM

## 2024-08-07 DIAGNOSIS — M72.0 PALMAR FASCIAL FIBROMATOSIS [DUPUYTREN]: ICD-10-CM

## 2024-08-07 PROBLEM — D22.39 MELANOCYTIC NEVI OF OTHER PARTS OF FACE: Status: ACTIVE | Noted: 2024-08-07

## 2024-08-07 PROBLEM — D22.5 MELANOCYTIC NEVI OF TRUNK: Status: ACTIVE | Noted: 2024-08-07

## 2024-08-07 PROBLEM — D18.01 HEMANGIOMA OF SKIN AND SUBCUTANEOUS TISSUE: Status: ACTIVE | Noted: 2024-08-07

## 2024-08-07 PROBLEM — D22.62 MELANOCYTIC NEVI OF LEFT UPPER LIMB, INCLUDING SHOULDER: Status: ACTIVE | Noted: 2024-08-07

## 2024-08-07 PROBLEM — D22.61 MELANOCYTIC NEVI OF RIGHT UPPER LIMB, INCLUDING SHOULDER: Status: ACTIVE | Noted: 2024-08-07

## 2024-08-07 PROCEDURE — OTHER PATIENT SPECIFIC COUNSELING: OTHER

## 2024-08-07 PROCEDURE — 99213 OFFICE O/P EST LOW 20 MIN: CPT | Mod: 25

## 2024-08-07 PROCEDURE — OTHER SUNSCREEN RECOMMENDATIONS: OTHER

## 2024-08-07 PROCEDURE — 17003 DESTRUCT PREMALG LES 2-14: CPT

## 2024-08-07 PROCEDURE — OTHER MONITORING: OTHER

## 2024-08-07 PROCEDURE — OTHER PHOTO-DOCUMENTATION: OTHER

## 2024-08-07 PROCEDURE — OTHER LIQUID NITROGEN: OTHER

## 2024-08-07 PROCEDURE — OTHER COUNSELING: OTHER

## 2024-08-07 PROCEDURE — 17000 DESTRUCT PREMALG LESION: CPT

## 2024-08-07 PROCEDURE — OTHER MIPS QUALITY: OTHER

## 2024-08-07 ASSESSMENT — LOCATION DETAILED DESCRIPTION DERM
LOCATION DETAILED: LEFT ULNAR PALM
LOCATION DETAILED: SUPERIOR THORACIC SPINE
LOCATION DETAILED: RIGHT SUPERIOR POSTERIOR HELIX
LOCATION DETAILED: RIGHT INFERIOR POSTERIOR HELIX
LOCATION DETAILED: LEFT SUPERIOR MEDIAL UPPER BACK
LOCATION DETAILED: RIGHT LATERAL UPPER BACK
LOCATION DETAILED: LEFT LATERAL PROXIMAL UPPER ARM
LOCATION DETAILED: RIGHT VENTRAL PROXIMAL FOREARM
LOCATION DETAILED: RIGHT PROXIMAL ULNAR THUMB
LOCATION DETAILED: LEFT LATERAL DISTAL UPPER ARM
LOCATION DETAILED: LEFT INFERIOR LATERAL MALAR CHEEK
LOCATION DETAILED: LEFT VENTRAL PROXIMAL FOREARM
LOCATION DETAILED: LEFT INFERIOR CENTRAL MALAR CHEEK
LOCATION DETAILED: LEFT ANTECUBITAL SKIN
LOCATION DETAILED: LEFT LATERAL UPPER BACK
LOCATION DETAILED: LEFT CENTRAL MALAR CHEEK
LOCATION DETAILED: LEFT LATERAL SUPERIOR CHEST

## 2024-08-07 ASSESSMENT — LOCATION ZONE DERM
LOCATION ZONE: TRUNK
LOCATION ZONE: FACE
LOCATION ZONE: FINGER
LOCATION ZONE: HAND
LOCATION ZONE: EAR
LOCATION ZONE: ARM

## 2024-08-07 ASSESSMENT — LOCATION SIMPLE DESCRIPTION DERM
LOCATION SIMPLE: RIGHT UPPER BACK
LOCATION SIMPLE: CHEST
LOCATION SIMPLE: LEFT UPPER ARM
LOCATION SIMPLE: RIGHT FOREARM
LOCATION SIMPLE: RIGHT THUMB
LOCATION SIMPLE: LEFT HAND
LOCATION SIMPLE: LEFT CHEEK
LOCATION SIMPLE: LEFT UPPER BACK
LOCATION SIMPLE: LEFT FOREARM
LOCATION SIMPLE: UPPER BACK
LOCATION SIMPLE: RIGHT EAR

## 2024-08-07 NOTE — PROCEDURE: LIQUID NITROGEN
Render Note In Bullet Format When Appropriate: No
Post-Care Instructions: I reviewed with the patient in detail post-care instructions. Patient is to wear sunprotection, and avoid picking at any of the treated lesions. Pt may apply Vaseline to crusted or scabbing areas.
Duration Of Freeze Thaw-Cycle (Seconds): 6
Show Aperture Variable?: Yes
Number Of Freeze-Thaw Cycles: 1 freeze-thaw cycle
Consent: The patient's consent was obtained including but not limited to risks of crusting, scabbing, blistering, scarring, darker or lighter pigmentary change, recurrence, incomplete removal and infection.
Detail Level: Detailed

## 2024-08-15 ENCOUNTER — OFFICE VISIT (OUTPATIENT)
Dept: INTERNAL MEDICINE | Facility: CLINIC | Age: 69
End: 2024-08-15
Attending: INTERNAL MEDICINE
Payer: COMMERCIAL

## 2024-08-15 VITALS
HEIGHT: 66 IN | BODY MASS INDEX: 30.33 KG/M2 | OXYGEN SATURATION: 96 % | WEIGHT: 188.7 LBS | TEMPERATURE: 98 F | DIASTOLIC BLOOD PRESSURE: 74 MMHG | HEART RATE: 63 BPM | RESPIRATION RATE: 17 BRPM | SYSTOLIC BLOOD PRESSURE: 130 MMHG

## 2024-08-15 DIAGNOSIS — Z00.00 MEDICARE ANNUAL WELLNESS VISIT, SUBSEQUENT: Primary | ICD-10-CM

## 2024-08-15 DIAGNOSIS — I10 ESSENTIAL HYPERTENSION, BENIGN: ICD-10-CM

## 2024-08-15 DIAGNOSIS — J43.1 PANLOBULAR EMPHYSEMA (H): ICD-10-CM

## 2024-08-15 DIAGNOSIS — J44.1 COPD EXACERBATION (H): ICD-10-CM

## 2024-08-15 DIAGNOSIS — E78.5 HYPERLIPIDEMIA LDL GOAL <130: ICD-10-CM

## 2024-08-15 DIAGNOSIS — C61 PROSTATE CANCER (H): ICD-10-CM

## 2024-08-15 DIAGNOSIS — I48.91 NEW ONSET ATRIAL FIBRILLATION (H): ICD-10-CM

## 2024-08-15 LAB
ALT SERPL W P-5'-P-CCNC: 11 U/L (ref 0–70)
ANION GAP SERPL CALCULATED.3IONS-SCNC: 12 MMOL/L (ref 7–15)
AST SERPL W P-5'-P-CCNC: 17 U/L (ref 0–45)
BUN SERPL-MCNC: 24.4 MG/DL (ref 8–23)
CALCIUM SERPL-MCNC: 10 MG/DL (ref 8.8–10.4)
CHLORIDE SERPL-SCNC: 99 MMOL/L (ref 98–107)
CHOLEST SERPL-MCNC: 177 MG/DL
CREAT SERPL-MCNC: 1.28 MG/DL (ref 0.67–1.17)
EGFRCR SERPLBLD CKD-EPI 2021: 60 ML/MIN/1.73M2
ERYTHROCYTE [DISTWIDTH] IN BLOOD BY AUTOMATED COUNT: 13 % (ref 10–15)
FASTING STATUS PATIENT QL REPORTED: YES
FASTING STATUS PATIENT QL REPORTED: YES
GLUCOSE SERPL-MCNC: 119 MG/DL (ref 70–99)
HCO3 SERPL-SCNC: 26 MMOL/L (ref 22–29)
HCT VFR BLD AUTO: 45.2 % (ref 40–53)
HDLC SERPL-MCNC: 66 MG/DL
HGB BLD-MCNC: 15.1 G/DL (ref 13.3–17.7)
LDLC SERPL CALC-MCNC: 100 MG/DL
MCH RBC QN AUTO: 31.7 PG (ref 26.5–33)
MCHC RBC AUTO-ENTMCNC: 33.4 G/DL (ref 31.5–36.5)
MCV RBC AUTO: 95 FL (ref 78–100)
NONHDLC SERPL-MCNC: 111 MG/DL
PLATELET # BLD AUTO: 232 10E3/UL (ref 150–450)
POTASSIUM SERPL-SCNC: 4.9 MMOL/L (ref 3.4–5.3)
PSA SERPL DL<=0.01 NG/ML-MCNC: 1.86 NG/ML (ref 0–6.5)
RBC # BLD AUTO: 4.76 10E6/UL (ref 4.4–5.9)
SODIUM SERPL-SCNC: 137 MMOL/L (ref 135–145)
TRIGL SERPL-MCNC: 54 MG/DL
WBC # BLD AUTO: 5.6 10E3/UL (ref 4–11)

## 2024-08-15 PROCEDURE — 84153 ASSAY OF PSA TOTAL: CPT | Performed by: INTERNAL MEDICINE

## 2024-08-15 PROCEDURE — 84450 TRANSFERASE (AST) (SGOT): CPT | Performed by: INTERNAL MEDICINE

## 2024-08-15 PROCEDURE — 36415 COLL VENOUS BLD VENIPUNCTURE: CPT | Performed by: INTERNAL MEDICINE

## 2024-08-15 PROCEDURE — 99214 OFFICE O/P EST MOD 30 MIN: CPT | Mod: 25 | Performed by: INTERNAL MEDICINE

## 2024-08-15 PROCEDURE — 80048 BASIC METABOLIC PNL TOTAL CA: CPT | Performed by: INTERNAL MEDICINE

## 2024-08-15 PROCEDURE — 84460 ALANINE AMINO (ALT) (SGPT): CPT | Performed by: INTERNAL MEDICINE

## 2024-08-15 PROCEDURE — 80061 LIPID PANEL: CPT | Performed by: INTERNAL MEDICINE

## 2024-08-15 PROCEDURE — 85027 COMPLETE CBC AUTOMATED: CPT | Performed by: INTERNAL MEDICINE

## 2024-08-15 PROCEDURE — G0439 PPPS, SUBSEQ VISIT: HCPCS | Performed by: INTERNAL MEDICINE

## 2024-08-15 RX ORDER — OLMESARTAN MEDOXOMIL 20 MG/1
20 TABLET ORAL DAILY
Qty: 90 TABLET | Refills: 3 | Status: SHIPPED | OUTPATIENT
Start: 2024-08-15

## 2024-08-15 SDOH — HEALTH STABILITY: PHYSICAL HEALTH: ON AVERAGE, HOW MANY MINUTES DO YOU ENGAGE IN EXERCISE AT THIS LEVEL?: 20 MIN

## 2024-08-15 SDOH — HEALTH STABILITY: PHYSICAL HEALTH: ON AVERAGE, HOW MANY DAYS PER WEEK DO YOU ENGAGE IN MODERATE TO STRENUOUS EXERCISE (LIKE A BRISK WALK)?: 3 DAYS

## 2024-08-15 ASSESSMENT — SOCIAL DETERMINANTS OF HEALTH (SDOH): HOW OFTEN DO YOU GET TOGETHER WITH FRIENDS OR RELATIVES?: MORE THAN THREE TIMES A WEEK

## 2024-08-15 ASSESSMENT — PAIN SCALES - GENERAL: PAINLEVEL: NO PAIN (0)

## 2024-08-15 NOTE — PATIENT INSTRUCTIONS
We are rechecking your labs.  I will let you know if the results indicate any changes in your therapy.  Unless you hear otherwise, continue all medications at the same doses.  Contact your usual pharmacy if you need refills.     Assuming your labs look good, then continue all medications at the same doses.  Contact your usual pharmacy if you need refills.      Continue all medications at the same doses.  Contact your usual pharmacy if you need refills.      Follow up with Cardiology Clinic as planned.  Specifically discuss a potential ablation procedure to make the atrial flutter go away.     If the PSA level is higher than last visit, then you need to follow-up with urology again, possibly to discuss additional treatment options for prostate cancer..     Investigate a Shingrix shingles vaccine with your pharmacist .  They can tell you the coverage and cost and then give it to you if the price is acceptable.    Medicare sometimes does not cover these Shingrix shingles vaccines, and with Medicare insurance it is usually cheaper to receive this shingles vaccine from a pharmacist in a pharmacy rather than in our clinic.    At this time, you only need the 2 Shingrix vaccines and then you are done.      RSV vaccines are now available.  The will help provide protection against respiratory syncytial virus (RSV), a common upper respiratory virus that is known to cause severe inflammation in the airways.  This vaccine is recommended for adults over age 60, especially those with high risk conditions and/or chronic respiratory illnesses such as asthma or COPD.  This vaccine is available at most pharmacies and clinics.    If you are on Medicare insurance, get this vaccine from a pharmacist in a pharmacy for the best cost and to confirm coverage, it will be less expensive to get this there rather than from our clinic.        Plan to get the annual influenza vaccine each fall for sure by the end of October for the best  "protection throughout the winter flu season.   Get this from any pharmacy or flu shot clinic.       Plan to get an updated Covid booster each fall at least by the end of October for the best protection throughout the winter season.   Get this from any pharmacy or Covid vaccine clinic.          Return to see me in 1 year, schedule a follow up visit sooner if needed for anything else.  Use [employer] or Call [phone] to schedule the appointment with me.        5 GOALS TO PREVENT VASCULAR DISEASE:     1.  Aggressive blood pressure control, under 130/80 ideally.  Using medications if needed.    Your blood pressure is under good control    BP Readings from Last 4 Encounters:   08/15/24 130/74   04/24/24 136/68   12/14/23 (!) 152/74   11/30/23 119/83       2.  Aggressive LDL cholesterol (\"bad cholesterol\") lowering as indicated.    Your goal is an LDL under 130 for sure, preferably under 100.  (If you have diabetes or previous vascular disease, the the LDL goals would be under 100 for sure, preferably under 70.)    New guidelines identify four high-risk groups who could benefit from statins:   *people with pre-existing heart disease, such as those who have had a heart attack;   *people ages 40 to 75 who have diabetes of any type  *patients ages 40 to 75 with at least a 7.5% risk of developing cardiovascular disease over the next decade, according to a formula described in the guidelines  *patients with the sort of super-high cholesterol that sometimes runs in families, as evidenced by an LDL of 190 milligrams per deciliter or higher    Your cholesterol levels are well controlled.    Recent Labs   Lab Test 11/18/21  1044 11/20/20  1151   CHOL 191 207*   HDL 72 71   * 122*   TRIG 60 72       3.  Aggressive diabetic prevention, screening and/or management.      You do not have diabetes as of the most recent blood tests.     4.  No smoking    5.  Consider daily preventative aspirin over age 50 if you have enough " cardiac risk factors to place you at higher risk for the presence of vascular disease.    If you have any reason not to take aspirin such easy bruising or bleeding, stomach problems, other anticoagulant medications, or any other side effects, then you should not take Aspirin.     --Based on your current cardiac disease risk profile and/or age over 75, you do NOT need to take daily preventative aspirin.        Preventive Health Recommendations:   Male Ages 65 - 75    Yearly exam:             See your health care provider every year in order to  o   Review health changes.   o   Discuss preventive care.    o   Review your medicines if your doctor has prescribed any.  Talk with your health care provider about whether you should have a test to screen for prostate cancer (PSA).  Every 3 years, have a diabetes test (fasting glucose). If you are at risk for diabetes, you should have this test more often.  At least Every 5 years, have a cholesterol test. Have this test more often if you have medical conditions that place you at higher risk for high cholesterol or heart disease.   Regular colon cancer screening starting age 45 with either a colonoscopy, or stool test (either Cologuard every 3 years or yearly FIT stool test from ).  The intervals for colon cancer screening will be determined by family history and prior colon cancer screening results.   Routine prostate cancer screening with a PSA blood test every 1-2 years.   While the PSA blood test is not a direct cancer screening blood test, it detects inflammation within in the prostate, which could indicate possible cancer.  If the test is abnormal, you do not necessarily have prostate cancer, but would need further evaluation.    Talk to with your health care provider about screening for Abdominal Aortic Aneurysm if you have a family history of AAA or have a history of smoking.    Shots:   Get a flu shot each year.   Covid vaccines are now recommended annually.  Get  "the most updated Covid vaccine when it becomes available, consider getting this at the same time as the annual influenza vaccine.   Get a tetanus shot every 10 years.  Everyone over 65 should make sure to receive pneumonia vaccines to prevent the most common type of bacterial pneumonia: Pneumococcal pneumonia. There are now two you should receive - Pneumovax (PPSV 23) and Prevnar (PCV 20)  Strongly consider the Shingrix shingles vaccine to give you the best chance of avoiding future shingles infection (as many as 1 and 3 adults over age 50 may develop this condition in their lifetime).    If you have Medicare insurance, investigate the cost and coverage for Shingrix shingles vaccines with a pharmacist at a pharmacy.  They can tell you the coverage and cost and then give it to you if the price is acceptable.    Medicare sometimes does not cover these Shingrix shingles vaccines, and with Medicare insurance it is usually cheaper to receive this shingles vaccine from a pharmacist in a pharmacy rather than in our clinic.    At this time, you only need the 2 Shingrix vaccines and then you are done.     Nutrition:   Eat at least 5 servings of fruits and vegetables each day.   Eat whole-grain bread, whole-wheat pasta and brown rice instead of white grains and rice.   Talk to your provider about Calcium and Vitamin D.      --Good Grains:  Oats, brown rice, Quinoa (these do not raise the blood sugar as much)     --Bad grains:  Anything made from wheat or white rice     (because these raise the blood sugars significantly, and the possible gluten issue from wheat for some people).      --Proteins:  Aim for \"lean proteins\" including chicken, fish, seafood, pork, turkey, and eggs (in moderation); Eat red meat only occasionally    Lifestyle  Exercise for at least 150 minutes a week (30 minutes a day, 5 days a week). This will help you control your weight and prevent disease.   Limit alcohol to one drink per day.   No smoking. "   Wear sunscreen to prevent skin cancer.   See your dentist every six months for an exam and cleaning.   See your eye doctor every 1 to 2 years to screen for conditions such as glaucoma, macular degeneration, cataracts, etc

## 2024-08-15 NOTE — PROGRESS NOTES
From Preventive Care Visit  [facility]  Wili Arevalo MD, Internal Medicine  Aug 15, 2024      Assessment & Plan     (Z00.00) Medicare annual wellness visit, subsequent  (primary encounter diagnosis)  Comment: Discussed cardiac disease risk factors and cardiac disease risk factor modification, including diabetes screening, blood pressure screening (and management if indicated), and cholesterol screening.   Reviewed immunzation guidelines, including pneumococcal vaccines, annual influenza, and shingles vaccines.   Discussed routine cancer screenings, including skin cancer, colon cancer screening for everyone until age 80, prostate cancer screening in men until age 75, mammogram and PAP/pelvic for women until age 75.   Recommended regular dentist visits to care for remaining teeth.   Recommended regular screening for vision and glaucoma.   Recommended safe driving and accident avoidance.    Counseling:    Reviewed preventive health counseling, as reflected in patient instructions       Regular exercise       Healthy diet/nutrition       Vision screening       Hearing screening       Immunizations  Strongly recommend that he receive an annual influenza vaccine, ideally in the middle of October.    Strongly recommend that he receive the updated COVID booster when available this fall.  Due for a tetanus vaccine update, get from a pharmacist in the pharmacy for best cost and coverage due to Medicare insurance.  Recommend he consider the RSV vaccination, get from the pharmacist in the pharmacy for best cost and coverage.  Investigate a Shingrix shingles vaccine with your pharmacist .  They can tell you the coverage and cost and then give it to you if the price is acceptable.    Medicare sometimes does not cover these Shingrix shingles vaccines, and with Medicare insurance it is usually cheaper to receive this shingles vaccine from a pharmacist in a pharmacy rather than in our clinic.    At  this time, you only need the 2 Shingrix vaccines and then you are done.    He is due for updated pneumococcal vaccination, I offered to give him today, he wished to defer this for now and get it at a later time from the pharmacy            Colorectal cancer screening       Prostate cancer screening         Patient has been advised of split billing requirements and indicates understanding: Yes   Plan: REVIEW OF HEALTH MAINTENANCE PROTOCOL ORDERS            (I48.91) New onset atrial fibrillation (H)  Comment: Known issue that I take into account for their medical decisions, managed by specialist.    Continue current therapy as directed by their specialist(s).   Patient may be a good candidate for an ablation procedure since his rhythm is predominantly atrial flutter.  Discuss this in further detail in his scheduled appointment with the electrophysiologist next month.   the patient is apprehensive about receiving an ablation procedure because he is friends with atrial fibrillation for whom these ablation procedures were not successful.  Please direct questions and concerns about ablation procedures to the cardiologist.  Plan: apixaban ANTICOAGULANT (ELIQUIS) 5 MG tablet,         REVIEW OF HEALTH MAINTENANCE PROTOCOL ORDERS,         CBC with platelets, Basic metabolic panel            (J44.1) COPD exacerbation (H)  Comment: This condition is currently controlled on the current medical regimen.  Continue current therapy.   Plan: REVIEW OF HEALTH MAINTENANCE PROTOCOL ORDERS            (C61) Prostate cancer (H)  Comment: Status post prostatectomy 11 years ago.  PSA level slowly rising.  Recheck again today, refer to urology for further evaluation to determine if the patient needs adjunctive therapy.  Plan: REVIEW OF HEALTH MAINTENANCE PROTOCOL ORDERS,         PSA, tumor marker            (I10) Essential hypertension, benign  Comment: This condition is currently controlled on the current medical regimen.  Continue current  "therapy.   Plan: olmesartan (BENICAR) 20 MG tablet, Lipid panel         reflex to direct LDL Non-fasting, REVIEW OF         HEALTH MAINTENANCE PROTOCOL ORDERS, CBC with         platelets, Basic metabolic panel, AST, ALT            (E78.5) Hyperlipidemia LDL goal <130  Comment: This condition is currently controlled on the current medical regimen.  Continue current therapy.   Plan: Lipid panel reflex to direct LDL Non-fasting,         REVIEW OF HEALTH MAINTENANCE PROTOCOL ORDERS,         CBC with platelets, Basic metabolic panel, AST,        ALT            (J43.1) Panlobular emphysema (H)  Comment: This condition is currently controlled on the current medical regimen.  Continue current therapy.   Plan: REVIEW OF HEALTH MAINTENANCE PROTOCOL ORDERS               Patient has been advised of split billing requirements and indicates understanding: Yes        BMI  Estimated body mass index is 30.46 kg/m  as calculated from the following:    Height as of this encounter: 1.676 m (5' 6\").    Weight as of this encounter: 85.6 kg (188 lb 11.2 oz).       Counseling  Appropriate preventive services were addressed with this patient via screening, questionnaire, or discussion as appropriate for fall prevention, nutrition, physical activity, Tobacco-use cessation, social engagement, weight loss and cognition.  Checklist reviewing preventive services available has been given to the patient.  Reviewed patient's diet, addressing concerns and/or questions.   He is at risk for lack of exercise and has been provided with information to increase physical activity for the benefit of his well-being.   He is at risk for psychosocial distress and has been provided with information to reduce risk.   The patient reports drinking more than 3 alcoholic drinks per day and/or more than 7 drhnks per week. The patient was counseled and given information about possible harmful effects of excessive alcohol intake.        Jairo Bauer is a 69 year " old, presenting for the following:  Medicare Visit (Fasting for labs)        8/15/2024     9:37 AM   Additional Questions   Roomed by Mary ASHRAF CMA     Health Care Directive  Patient does not have a Health Care Directive or Living Will: Discussed advance care planning with patient; however, patient declined at this time.    HPI      1.)  Hypertension:  History of hypertension, on medication.  No reported side effects from medications.    Reviewed last 6 BP readings in chart:  BP Readings from Last 6 Encounters:   08/15/24 130/74   04/24/24 136/68   12/14/23 (!) 152/74   11/30/23 119/83   10/09/23 131/84   09/13/23 (!) 168/82     No active cardiac complaints or symptoms with exercise.     2.) Has history of atrial fibrillation diagnosed last fall.  Reviewed cardiology clinic documentation.  Attempted cardioversion brought him back into sinus rhythm, however he reverted back into atrial fibrillation/flutter after the procedure.    No recent reports of significant palpitations, dizziness, presyncope, dyspnea on exertion, or difficulties with exertion.  .   Taking medications as ordered, no side effects reported.   Patient is taking anticoagulation according to direction, with no reported side effects.   He was recommended to consider an ablation procedure, however he reports that he is nervous about receiving it because he knows friends who have had the similar procedure for their atrial fibrillation be unsuccessful      3.)  history of prostate cancer, status post prostatectomy 2013.  Denies urinary incontinence.  Has some erectile dysfunction, takes sildenafil with good relief.      Lab Results   Component Value Date    PSA 1.59 08/09/2023    PSA 1.56 08/02/2022    PSA 1.30 11/18/2021    PSA 1.08 11/20/2020    PSA 0.92 12/28/2018    PSA 0.52 02/26/2018    PSA 0.45 02/24/2017    PSA 0.37 02/23/2016    PSA 0.17 02/23/2015    PSA <0.10 06/26/2014    PSA <0.04 09/26/2013    PSA 19.50 04/25/2011    PSA 19.50 04/25/2011     PSA 10.30 09/29/2008    PSA 10.70 07/30/2007          8/15/2024   General Health   How would you rate your overall physical health? Good   Feel stress (tense, anxious, or unable to sleep) Only a little      (!) STRESS CONCERN      8/15/2024   Nutrition   Diet: Regular (no restrictions)    I don't know       Multiple values from one day are sorted in reverse-chronological order         8/15/2024   Exercise   Days per week of moderate/strenous exercise 3 days   Average minutes spent exercising at this level 20 min            8/15/2024   Social Factors   Frequency of gathering with friends or relatives More than three times a week   Worry food won't last until get money to buy more No   Food not last or not have enough money for food? No   Do you have housing? (Housing is defined as stable permanent housing and does not include staying ouside in a car, in a tent, in an abandoned building, in an overnight shelter, or couch-surfing.) Yes   Are you worried about losing your housing? No   Lack of transportation? No   Unable to get utilities (heat,electricity)? No            8/15/2024   Fall Risk   Fallen 2 or more times in the past year? No    No   Trouble with walking or balance? No    No       Multiple values from one day are sorted in reverse-chronological order          8/15/2024   Activities of Daily Living- Home Safety   Needs help with the following daily activites None of the above   Safety concerns in the home None of the above            8/15/2024   Dental   Dentist two times every year? Yes            8/15/2024   Hearing Screening   Hearing concerns? None of the above            8/15/2024   Driving Risk Screening   Patient/family members have concerns about driving No            8/15/2024   General Alertness/Fatigue Screening   Have you been more tired than usual lately? No            8/15/2024   Urinary Incontinence Screening   Bothered by leaking urine in past 6 months No            8/15/2024   TB Screening    Were you born outside of the [place]? No            Today's PHQ-2 Score:       8/15/2024     9:50 AM   PHQ-2 (  [employer])   Q1: Little interest or pleasure in doing things 0   Q2: Feeling down, depressed or hopeless 0   PHQ-2 Score 0   Q1: Little interest or pleasure in doing things Not at all   Q2: Feeling down, depressed or hopeless Not at all   PHQ-2 Score 0           8/15/2024   Substance Use   Alcohol more than 3/day or more than 7/wk Yes   How often do you have a drink containing alcohol 4 or more times a week   How many alcohol drinks on typical day 5 or 6   How often do you have 5+ drinks at one occasion Daily or almost daily   Audit 2/3 Score 6   How often not able to stop drinking once started Never   How often failed to do what normally expected Never   How often needed first drink in am after a heavy drinking session Never   How often feeling of guilt or remorse after drinking Never   How often unable to remember what happened the night before Never   Have you or someone else been injured because of your drinking No   Has anyone been concerned or suggested you cut down on drinking No   TOTAL SCORE - AUDIT 10   Do you have a current opioid prescription? No   How severe/bad is pain from 1 to 10? 0/10 (No Pain)   Do you use any other substances recreationally? (!) CANNABIS PRODUCTS        Social History     Tobacco Use    Smoking status: Former     Current packs/day: 0.00     Average packs/day: 0.5 packs/day for 40.0 years (20.0 ttl pk-yrs)     Types: Cigarettes     Start date: 1974     Quit date: 2014     Years since quittin.7    Smokeless tobacco: Never    Tobacco comments:     quit    Vaping Use    Vaping status: Never Used   Substance Use Topics    Alcohol use: Yes     Alcohol/week: 16.7 standard drinks of alcohol     Types: 20 Cans of beer per week    Drug use: Yes     Types: Marijuana               Reviewed and updated as needed this visit by Provider     Meds             "    **I reviewed the information recorded in the patient's EPIC chart (including but not limited to medical history, surgical history, family history, problem list, medication list, and allergy list) and updated the information as indicated based on the patients reported information.         Current providers sharing in care for this patient include:  Patient Care Team:  Wili Arevalo MD as PCP - General  Wili Arevalo MD as Assigned PCP  Analilia Dang MD as MD (Cardiovascular Disease)  Analilia Dang MD as Assigned Heart and Vascular Provider    The following health maintenance items are reviewed in Epic and correct as of today:  Health Maintenance   Topic Date Due    SPIROMETRY  Never done    COPD ACTION PLAN  Never done    ZOSTER IMMUNIZATION (1 of 2) Never done    LUNG CANCER SCREENING  Never done    RSV VACCINE (Pregnancy & 60+) (1 - 1-dose 60+ series) Never done    Pneumococcal Vaccine: 65+ Years (2 of 2 - PCV) 11/20/2021    LIPID  11/18/2022    COVID-19 Vaccine (3 - 2023-24 season) 09/01/2023    INFLUENZA VACCINE (1) 09/01/2024    MEDICARE ANNUAL WELLNESS VISIT  08/15/2025    ANNUAL REVIEW OF HM ORDERS  08/15/2025    FALL RISK ASSESSMENT  08/15/2025    DTAP/TDAP/TD IMMUNIZATION (3 - Td or Tdap) 06/09/2026    GLUCOSE  08/09/2026    COLORECTAL CANCER SCREENING  09/16/2028    ADVANCE CARE PLANNING  08/15/2029    HEPATITIS C SCREENING  Completed    PHQ-2 (once per calendar year)  Completed    AORTIC ANEURYSM SCREENING (SYSTEM ASSIGNED)  Completed    IPV IMMUNIZATION  Aged Out    HPV IMMUNIZATION  Aged Out    MENINGITIS IMMUNIZATION  Aged Out    RSV MONOCLONAL ANTIBODY  Aged Out         Review of Systems  Constitutional, neuro, ENT, endocrine, pulmonary, cardiac, gastrointestinal, genitourinary, musculoskeletal, integument and psychiatric systems are negative, except as otherwise noted.     Objective    Exam  /74   Pulse 63   Temp 98  F (36.7  C) (Oral)   Resp 17   Ht 1.676 m (5' 6\")   " "Wt 85.6 kg (188 lb 11.2 oz)   SpO2 96%   BMI 30.46 kg/m     Estimated body mass index is 30.46 kg/m  as calculated from the following:    Height as of this encounter: 1.676 m (5' 6\").    Weight as of this encounter: 85.6 kg (188 lb 11.2 oz).    Physical Exam  GENERAL alert and no distress  EYES:  Normal sclera,conjunctiva, EOMI  HENT: oral and posterior pharynx without lesions or erythema, facies symmetric  NECK: Neck supple. No LAD, without thyroidmegaly.  RESP: Clear to ausculation bilaterally without wheezes or crackles. Normal BS in all fields.  CV: Irregular rhythm ( consistent with known atrial fibrillation), regular rate; normal S1S2 without murmurs, rubs or gallops   LYMPH: no cervical lymph adenopathy appreciated  MS: extremities- no gross deformities of the visible extremities noted,   EXT:  no lower extremity edema  PSYCH: Alert and oriented times 3; speech- coherent  SKIN:  No obvious significant skin lesions on visible portions of face         8/15/2024   Mini Cog   Clock Draw Score 2 Normal   3 Item Recall 3 objects recalled   Mini Cog Total Score 5                 Signed Electronically by: Wili Arevalo MD    "

## 2024-09-03 NOTE — PROGRESS NOTES
PHYSICIAN NOTE:  This visit was completed in person at the [facility].      I had the pleasure of seeing Mr. Juancarlos Ma for evaluation of atrial flutter. He previously saw my EP colleague at [facility] Dr. Analilia Dang.      69-year-old male with hypertension, prostate cancer, significant daily alcohol use and recurrent typical atrial flutter. He was successfully cardioverted in 10/2023 but thereafter developed recurrence. So far he has been treated with rate control (diltiazem) and anticoagulation (apixaban).    He is feeling well. He is unaware of heart rhythm irregularity. Stamina is normal. No chest pain during exertion except for an occasional hint of discomfort in an area below the left nipple (he points to the area with one finger). This discomfort seems to occur randomly, is unrelated to exertion.    He is a retired . Single. He spends amaral in [place]. Drinks a 6-pack of light beer/day.  Quit smoking 6 years ago.      PHYSICAL EXAMINATION:  Vital signs: 112/60, 102, 86.4 kg, BMI 30.7  General:  in no apparent distress  ENT/Mouth:  no nasal discharge.  Eyes:  normal conjunctivae.   Neck:  no thyromegaly or lymphadenopathy.  Chest/Lungs:  patient is not dyspneic.  Lungs CTA, without rales or wheezing  Cardiovascular: normal JVP, rhythm is irregular.  No gallop, murmur or rub.    Abdomen:  no abdominal distention.  Soft, negative HJR.    Extremities:  no edema  Skin:  no xanthelasma.    Neurologic:  alert & oriented x 3.      Vascular:  2+ carotids without bruits.  2+ radials.        DIAGNOSTIC STUDIES (reviewed/interpreted in the clinic today):  Laboratory studies: creatinine 1.28, potassium 4.9, sodium 137, cholesterol 177, hematocrit 45%  ECG: typical atrial flutter with controlled ventricular rate.  Echocardiogram (08/2023): EF 55%, normal RV, moderate to severe biatrial enlargement. 1+ MR, 1+ TR.      IMPRESSION:  Typical atrial flutter. ECG is consistent with typical  counterclockwise CTI flutter. No significant symptoms. No previous documentation of atrial fibrillation. [id number] is 2; anticoagulation is appropriate.  We reviewed 2 treatment options: (A) continuation of current rate control (diltiazem or other agent) and apixaban, (B) catheter ablation of atrial flutter. The technical aspects, risks/benefits of atrial flutter ablation were discussed in detail. Likelihood of a successful single procedure with permanent cure of atrial flutter is high. Risk of serious complication is low. Potential complications include, but are not limited to, vascular injury, bleeding, cardiac perforation, damage to the conduction system, TIA/CVA.   Given the lack of symptoms, I see 2 potential benefits of atrial flutter ablation: (A) eliminate Eliquis-associated long-term cost and (B) eliminate bleeding risk associated with chronic anticoagulation (~1% risk of serious bleeding per year, including intracranial hemorrhage).  I did share with the patient that some patients with atrial flutter also have atrial fibrillation. If we document atrial fibrillation after atrial flutter ablation, he would require long-term anticoagulation.    RECOMMENDATIONS:  The patient has elected to pursue catheter ablation of atrial flutter. We will schedule accordingly.  Continue Eliquis without interruption.  Plan for 14-day cardiac monitor after atrial flutter ablation to evaluate for possible paroxysmal AF.  Decreasing alcohol consumption is advisable.    It was my pleasure seeing this delightful patient.  Please feel free to call with any questions.     The longitudinal plan of care for the diagnosis(es)/condition(s) as documented were addressed during this visit. Due to the added complexity in care, I will continue to support Juancarlos in the subsequent management and with ongoing continuity of care.     Mi Pugh MD, [facility]      (Chart documentation was completed, in part, using Dragon voice-recognition  software. The note was reviewed, however grammatical and spelling errors may be present.)      CURRENT MEDICATIONS:  Current Outpatient Medications   Medication Sig Dispense Refill    albuterol (PROAIR HFA/PROVENTIL HFA/VENTOLIN HFA) 108 (90 Base) MCG/ACT inhaler Inhale 2 puffs into the lungs every 4 hours as needed for shortness of breath, wheezing or cough (Patient not taking: Reported on 9/13/2023) 18 g 5    apixaban ANTICOAGULANT (ELIQUIS) 5 MG tablet Take 1 tablet (5 mg) by mouth 2 times daily 180 tablet 3    diltiazem ER (DILT-XR) 240 MG 24 hr ER beaded capsule Take 1 capsule (240 mg) by mouth daily 90 capsule 3    olmesartan (BENICAR) 20 MG tablet Take 1 tablet (20 mg) by mouth daily 90 tablet 3       ALLERGIES     Allergies   Allergen Reactions    Lisinopril      Cough with Lisinopril    Penicillin [Penicillins] Hives       PAST MEDICAL HISTORY:  Past Medical History:   Diagnosis Date    Diverticulosis of colon 10/2/12    incidental sigmoid diverticulosis seen on routine colonoscopy, no h/o diverticulitis    Essential hypertension, benign     Fracture of fifth metacarpal bone of left hand 11/07/2017    intra-articular left 5th metacarpal base fracture with minimally dispalced left radial styloid fracture; no operative management    Prostate cancer (H) 2013    S/P partial colectomy 07/24/2019    s/p robotic assisted sigmoid colectomy for recurrent diverticulitis    Serrated adenoma of colon 10/2/12    2 sessile serrated adenomatous polyps removed at colonoscopy; 1 tubular adenoma       PAST SURGICAL HISTORY:  Past Surgical History:   Procedure Laterality Date    ANESTHESIA CARDIOVERSION N/A 10/9/2023    Procedure: cardioversion;  Surgeon: GENERIC ANESTHESIA PROVIDER;  Location:  OR    DAVINCI PROSTATECTOMY  6/10/2013    Procedure: DAVINCI PROSTATECTOMY;  ROBOTIC ASSIST PROSTATECTOMY;  Surgeon: Damon Jarrell MD;  Location: [facility] OR    DAVINCI XI ASSISTED RESECTION RECTOSIGMOID N/A 7/24/2019     Procedure: ROBOTIC ASSISTED SIGMOID COLECTOMY, ROBOT ASSISTED SMALL BOWEL RESECTION, ROBOT ASSISTED MOBILIZATION OF THE SPLENIC FLEXURE;  Surgeon: Neel Grimes MD;  Location:  OR    HC TOOTH EXTRACTION W/FORCEP      4 wisdom teeth removed without complication       FAMILY HISTORY:  Family History   Problem Relation Age of Onset    Hypertension Father          of cancer     Cancer Father         father  lymphoma at age 68    Respiratory Mother          at age 72 from silicosis, COPD, smoking    Diabetes Brother         type II DM    Family History Negative Sister     Family History Negative Brother        SOCIAL HISTORY:  Social History     Socioeconomic History    Marital status: Single   Occupational History     Employer: [email or web address]   Tobacco Use    Smoking status: Former     Current packs/day: 0.00     Average packs/day: 0.5 packs/day for 40.0 years (20.0 ttl pk-yrs)     Types: Cigarettes     Start date: 1974     Quit date: 2014     Years since quittin.7    Smokeless tobacco: Never    Tobacco comments:     quit    Vaping Use    Vaping status: Never Used   Substance and Sexual Activity    Alcohol use: Yes     Alcohol/week: 16.7 standard drinks of alcohol     Types: 20 Cans of beer per week    Drug use: Yes     Types: Marijuana    Sexual activity: Never     Social Determinants of Health     Financial Resource Strain: Low Risk  (8/15/2024)    Financial Resource Strain     Within the past 12 months, have you or your family members you live with been unable to get utilities (heat, electricity) when it was really needed?: No   Food Insecurity: Low Risk  (8/15/2024)    Food Insecurity     Within the past 12 months, did you worry that your food would run out before you got money to buy more?: No     Within the past 12 months, did the food you bought just not last and you didn t have money to get more?: No   Transportation Needs: Low Risk  (8/15/2024)     Transportation Needs     Within the past 12 months, has lack of transportation kept you from medical appointments, getting your medicines, non-medical meetings or appointments, work, or from getting things that you need?: No   Physical Activity: Insufficiently Active (8/15/2024)    Exercise Vital Sign     Days of Exercise per Week: 3 days     Minutes of Exercise per Session: 20 min   Stress: No Stress Concern Present (8/15/2024)    [employer] [place] [employer] - Occupational Stress Questionnaire     Feeling of Stress : Only a little   Social Connections: Unknown (8/15/2024)    Social Connection and Isolation Panel [NHANES]     Frequency of Social Gatherings with Friends and Family: More than three times a week   Interpersonal Safety: Low Risk  (8/15/2024)    Interpersonal Safety     Do you feel physically and emotionally safe where you currently live?: Yes     Within the past 12 months, have you been hit, slapped, kicked or otherwise physically hurt by someone?: No     Within the past 12 months, have you been humiliated or emotionally abused in other ways by your partner or ex-partner?: No   Housing Stability: Low Risk  (8/15/2024)    Housing Stability     Do you have housing? : Yes     Are you worried about losing your housing?: No

## 2024-09-04 ENCOUNTER — OFFICE VISIT (OUTPATIENT)
Dept: CARDIOLOGY | Facility: CLINIC | Age: 69
End: 2024-09-04
Payer: COMMERCIAL

## 2024-09-04 VITALS
DIASTOLIC BLOOD PRESSURE: 60 MMHG | WEIGHT: 190.5 LBS | HEART RATE: 102 BPM | HEIGHT: 66 IN | SYSTOLIC BLOOD PRESSURE: 112 MMHG | BODY MASS INDEX: 30.62 KG/M2 | OXYGEN SATURATION: 96 %

## 2024-09-04 DIAGNOSIS — I48.3 TYPICAL ATRIAL FLUTTER (H): Primary | ICD-10-CM

## 2024-09-04 PROCEDURE — 99204 OFFICE O/P NEW MOD 45 MIN: CPT | Performed by: INTERNAL MEDICINE

## 2024-09-04 PROCEDURE — 93000 ELECTROCARDIOGRAM COMPLETE: CPT | Performed by: INTERNAL MEDICINE

## 2024-09-04 NOTE — LETTER
9/4/2024    Wili Arevalo MD  [address] [facility] [address]    RE: Juancarlos Ma       Dear Colleague,     I had the pleasure of seeing Juancarlos Ma in the [facility].  PHYSICIAN NOTE:  This visit was completed in person at the [facility].      I had the pleasure of seeing Mr. Juancarlos Ma for evaluation of atrial flutter. He previously saw my EP colleague at [facility] Dr. Analilia Dang.      69-year-old male with hypertension, prostate cancer, significant daily alcohol use and recurrent typical atrial flutter. He was successfully cardioverted in 10/2023 but thereafter developed recurrence. So far he has been treated with rate control (diltiazem) and anticoagulation (apixaban).    He is feeling well. He is unaware of heart rhythm irregularity. Stamina is normal. No chest pain during exertion except for an occasional hint of discomfort in an area below the left nipple (he points to the area with one finger). This discomfort seems to occur randomly, is unrelated to exertion.    He is a retired . Single. He spends amaral in [place]. Drinks a 6-pack of light beer/day.  Quit smoking 6 years ago.      PHYSICAL EXAMINATION:  Vital signs: 112/60, 102, 86.4 kg, BMI 30.7  General:  in no apparent distress  ENT/Mouth:  no nasal discharge.  Eyes:  normal conjunctivae.   Neck:  no thyromegaly or lymphadenopathy.  Chest/Lungs:  patient is not dyspneic.  Lungs CTA, without rales or wheezing  Cardiovascular: normal JVP, rhythm is irregular.  No gallop, murmur or rub.    Abdomen:  no abdominal distention.  Soft, negative HJR.    Extremities:  no edema  Skin:  no xanthelasma.    Neurologic:  alert & oriented x 3.      Vascular:  2+ carotids without bruits.  2+ radials.        DIAGNOSTIC STUDIES (reviewed/interpreted in the clinic today):  Laboratory studies: creatinine 1.28, potassium 4.9, sodium 137, cholesterol 177, hematocrit 45%  ECG: typical atrial flutter with  controlled ventricular rate.  Echocardiogram (08/2023): EF 55%, normal RV, moderate to severe biatrial enlargement. 1+ MR, 1+ TR.      IMPRESSION:  Typical atrial flutter. ECG is consistent with typical counterclockwise CTI flutter. No significant symptoms. No previous documentation of atrial fibrillation. [id number] is 2; anticoagulation is appropriate.  We reviewed 2 treatment options: (A) continuation of current rate control (diltiazem or other agent) and apixaban, (B) catheter ablation of atrial flutter. The technical aspects, risks/benefits of atrial flutter ablation were discussed in detail. Likelihood of a successful single procedure with permanent cure of atrial flutter is high. Risk of serious complication is low. Potential complications include, but are not limited to, vascular injury, bleeding, cardiac perforation, damage to the conduction system, TIA/CVA.   Given the lack of symptoms, I see 2 potential benefits of atrial flutter ablation: (A) eliminate Eliquis-associated long-term cost and (B) eliminate bleeding risk associated with chronic anticoagulation (~1% risk of serious bleeding per year, including intracranial hemorrhage).  I did share with the patient that some patients with atrial flutter also have atrial fibrillation. If we document atrial fibrillation after atrial flutter ablation, he would require long-term anticoagulation.    RECOMMENDATIONS:  The patient has elected to pursue catheter ablation of atrial flutter. We will schedule accordingly.  Continue Eliquis without interruption.  Plan for 14-day cardiac monitor after atrial flutter ablation to evaluate for possible paroxysmal AF.  Decreasing alcohol consumption is advisable.    It was my pleasure seeing this delightful patient.  Please feel free to call with any questions.     The longitudinal plan of care for the diagnosis(es)/condition(s) as documented were addressed during this visit. Due to the added complexity in care, I will  continue to support Juancarlos in the subsequent management and with ongoing continuity of care.     Mi Pugh MD, [facility]      (Chart documentation was completed, in part, using Dragon voice-recognition software. The note was reviewed, however grammatical and spelling errors may be present.)      CURRENT MEDICATIONS:  Current Outpatient Medications   Medication Sig Dispense Refill     albuterol (PROAIR HFA/PROVENTIL HFA/VENTOLIN HFA) 108 (90 Base) MCG/ACT inhaler Inhale 2 puffs into the lungs every 4 hours as needed for shortness of breath, wheezing or cough (Patient not taking: Reported on 9/13/2023) 18 g 5     apixaban ANTICOAGULANT (ELIQUIS) 5 MG tablet Take 1 tablet (5 mg) by mouth 2 times daily 180 tablet 3     diltiazem ER (DILT-XR) 240 MG 24 hr ER beaded capsule Take 1 capsule (240 mg) by mouth daily 90 capsule 3     olmesartan (BENICAR) 20 MG tablet Take 1 tablet (20 mg) by mouth daily 90 tablet 3       ALLERGIES     Allergies   Allergen Reactions     Lisinopril      Cough with Lisinopril     Penicillin [Penicillins] Hives       PAST MEDICAL HISTORY:  Past Medical History:   Diagnosis Date     Diverticulosis of colon 10/2/12    incidental sigmoid diverticulosis seen on routine colonoscopy, no h/o diverticulitis     Essential hypertension, benign      Fracture of fifth metacarpal bone of left hand 11/07/2017    intra-articular left 5th metacarpal base fracture with minimally dispalced left radial styloid fracture; no operative management     Prostate cancer (H) 2013     S/P partial colectomy 07/24/2019    s/p robotic assisted sigmoid colectomy for recurrent diverticulitis     Serrated adenoma of colon 10/2/12    2 sessile serrated adenomatous polyps removed at colonoscopy; 1 tubular adenoma       PAST SURGICAL HISTORY:  Past Surgical History:   Procedure Laterality Date     ANESTHESIA CARDIOVERSION N/A 10/9/2023    Procedure: cardioversion;  Surgeon: GENERIC ANESTHESIA PROVIDER;  Location: [facility]  PROSTATECTOMY  6/10/2013    Procedure: DAVINCI PROSTATECTOMY;  ROBOTIC ASSIST PROSTATECTOMY;  Surgeon: Damon Jarrell MD;  Location: [facility] OR     DAVINCI XI ASSISTED RESECTION RECTOSIGMOID N/A 2019    Procedure: ROBOTIC ASSISTED SIGMOID COLECTOMY, ROBOT ASSISTED SMALL BOWEL RESECTION, ROBOT ASSISTED MOBILIZATION OF THE SPLENIC FLEXURE;  Surgeon: Neel Grimes MD;  Location:  OR      TOOTH EXTRACTION [id number]    4 wisdom teeth removed without complication       FAMILY HISTORY:  Family History   Problem Relation Age of Onset     Hypertension Father          of cancer      Cancer Father         father  lymphoma at age 68     Respiratory Mother          at age 72 from silicosis, COPD, smoking     Diabetes Brother         type II DM     Family History Negative Sister      Family History Negative Brother        SOCIAL HISTORY:  Social History     Socioeconomic History     Marital status: Single   Occupational History     Employer: [employer]   Tobacco Use     Smoking status: Former     Current packs/day: 0.00     Average packs/day: 0.5 packs/day for 40.0 years (20.0 ttl pk-yrs)     Types: Cigarettes     Start date: 1974     Quit date: 2014     Years since quittin.7     Smokeless tobacco: Never     Tobacco comments:     quit    Vaping Use     Vaping status: Never Used   Substance and Sexual Activity     Alcohol use: Yes     Alcohol/week: 16.7 standard drinks of alcohol     Types: 20 Cans of beer per week     Drug use: Yes     Types: Marijuana     Sexual activity: Never     Social Determinants of Health     Financial Resource Strain: Low Risk  (8/15/2024)    Financial Resource Strain      Within the past 12 months, have you or your family members you live with been unable to get utilities (heat, electricity) when it was really needed?: No   Food Insecurity: Low Risk  (8/15/2024)    Food Insecurity      Within the past 12 months, did you worry  that your food would run out before you got money to buy more?: No      Within the past 12 months, did the food you bought just not last and you didn t have money to get more?: No   Transportation Needs: Low Risk  (8/15/2024)    Transportation Needs      Within the past 12 months, has lack of transportation kept you from medical appointments, getting your medicines, non-medical meetings or appointments, work, or from getting things that you need?: No   Physical Activity: Insufficiently Active (8/15/2024)    Exercise Vital Sign      Days of Exercise per Week: 3 days      Minutes of Exercise per Session: 20 min   Stress: No Stress Concern Present (8/15/2024)    [employer] - Occupational Stress Questionnaire      Feeling of Stress : Only a little   Social Connections: Unknown (8/15/2024)    Social Connection and Isolation Panel [NHANES]      Frequency of Social Gatherings with Friends and Family: More than three times a week   Interpersonal Safety: Low Risk  (8/15/2024)    Interpersonal Safety      Do you feel physically and emotionally safe where you currently live?: Yes      Within the past 12 months, have you been hit, slapped, kicked or otherwise physically hurt by someone?: No      Within the past 12 months, have you been humiliated or emotionally abused in other ways by your partner or ex-partner?: No   Housing Stability: Low Risk  (8/15/2024)    Housing Stability      Do you have housing? : Yes      Are you worried about losing your housing?: No                 Thank you for allowing me to participate in the care of your patient.      Sincerely,     Mi Pugh MD     [facility]  cc:   Analilia Dang MD  [address] [place] [phone]

## 2024-09-13 ENCOUNTER — TELEPHONE (OUTPATIENT)
Dept: CARDIOLOGY | Facility: CLINIC | Age: 69
End: 2024-09-13
Payer: COMMERCIAL

## 2024-09-13 DIAGNOSIS — I48.3 TYPICAL ATRIAL FLUTTER (H): Primary | ICD-10-CM

## 2024-09-13 RX ORDER — SODIUM CHLORIDE, SODIUM LACTATE, POTASSIUM CHLORIDE, CALCIUM CHLORIDE 600; 310; 30; 20 MG/100ML; MG/100ML; MG/100ML; MG/100ML
INJECTION, SOLUTION INTRAVENOUS CONTINUOUS
Status: CANCELLED | OUTPATIENT
Start: 2024-09-13

## 2024-09-13 RX ORDER — LIDOCAINE 40 MG/G
CREAM TOPICAL
Status: CANCELLED | OUTPATIENT
Start: 2024-09-13

## 2024-09-13 NOTE — TELEPHONE ENCOUNTER
Spoke to patient to review instructions for aflutter ablation scheduled for 9/16.  Patient aware that he is to be NPO after midnight and may take sips of water with am medications. He can have clear liquids until 4:30am.  Patient to arrive at 6:30am.  Patient aware that he will NOT be staying overnight after procedure unless there are complications.  Patient has arranged for ride and someone to be with him for the first 24 hours.  Patient to call [facility] at [phone] to alert if they going to cancel d/t illness or family emergency.  Patient provided verbal understanding regarding above.   KATERINA Lewis

## 2024-09-13 NOTE — PROGRESS NOTES
Chart reviewed for scheduled atrial flutter ablation. CSE, orders in, no pertinent allergies, pre-call completed by heart clinic RN.

## 2024-09-16 ENCOUNTER — HOSPITAL ENCOUNTER (OUTPATIENT)
Facility: CLINIC | Age: 69
Discharge: HOME | End: 2024-09-16
Attending: INTERNAL MEDICINE | Admitting: INTERNAL MEDICINE
Payer: COMMERCIAL

## 2024-09-16 VITALS
HEART RATE: 69 BPM | DIASTOLIC BLOOD PRESSURE: 74 MMHG | SYSTOLIC BLOOD PRESSURE: 122 MMHG | OXYGEN SATURATION: 94 % | HEIGHT: 66 IN | WEIGHT: 190 LBS | BODY MASS INDEX: 30.53 KG/M2 | RESPIRATION RATE: 15 BRPM

## 2024-09-16 DIAGNOSIS — I48.3 TYPICAL ATRIAL FLUTTER (H): ICD-10-CM

## 2024-09-16 LAB
ANION GAP SERPL CALCULATED.3IONS-SCNC: 9 MMOL/L (ref 7–15)
ATRIAL RATE - MUSE: 131 BPM
ATRIAL RATE - MUSE: 65 BPM
BUN SERPL-MCNC: 19.3 MG/DL (ref 8–23)
CALCIUM SERPL-MCNC: 9.8 MG/DL (ref 8.8–10.4)
CHLORIDE SERPL-SCNC: 100 MMOL/L (ref 98–107)
CREAT SERPL-MCNC: 1.28 MG/DL (ref 0.67–1.17)
DIASTOLIC BLOOD PRESSURE - MUSE: NORMAL MMHG
DIASTOLIC BLOOD PRESSURE - MUSE: NORMAL MMHG
EGFRCR SERPLBLD CKD-EPI 2021: 60 ML/MIN/1.73M2
ERYTHROCYTE [DISTWIDTH] IN BLOOD BY AUTOMATED COUNT: 12.9 % (ref 10–15)
GLUCOSE SERPL-MCNC: 118 MG/DL (ref 70–99)
HCO3 SERPL-SCNC: 28 MMOL/L (ref 22–29)
HCT VFR BLD AUTO: 43.6 % (ref 40–53)
HGB BLD-MCNC: 14.7 G/DL (ref 13.3–17.7)
INTERPRETATION ECG - MUSE: NORMAL
INTERPRETATION ECG - MUSE: NORMAL
MCH RBC QN AUTO: 31.9 PG (ref 26.5–33)
MCHC RBC AUTO-ENTMCNC: 33.7 G/DL (ref 31.5–36.5)
MCV RBC AUTO: 95 FL (ref 78–100)
P AXIS - MUSE: 83 DEGREES
P AXIS - MUSE: NORMAL DEGREES
PLATELET # BLD AUTO: 225 10E3/UL (ref 150–450)
POTASSIUM SERPL-SCNC: 4.9 MMOL/L (ref 3.4–5.3)
PR INTERVAL - MUSE: 172 MS
PR INTERVAL - MUSE: 174 MS
QRS DURATION - MUSE: 74 MS
QRS DURATION - MUSE: 84 MS
QT - MUSE: 320 MS
QT - MUSE: 406 MS
QTC - MUSE: 422 MS
QTC - MUSE: 422 MS
R AXIS - MUSE: 81 DEGREES
R AXIS - MUSE: 82 DEGREES
RBC # BLD AUTO: 4.61 10E6/UL (ref 4.4–5.9)
SODIUM SERPL-SCNC: 137 MMOL/L (ref 135–145)
SYSTOLIC BLOOD PRESSURE - MUSE: NORMAL MMHG
SYSTOLIC BLOOD PRESSURE - MUSE: NORMAL MMHG
T AXIS - MUSE: 67 DEGREES
T AXIS - MUSE: 77 DEGREES
VENTRICULAR RATE- MUSE: 105 BPM
VENTRICULAR RATE- MUSE: 65 BPM
WBC # BLD AUTO: 6.6 10E3/UL (ref 4–11)

## 2024-09-16 PROCEDURE — C1731 CATH, EP, 20 OR MORE ELEC: HCPCS | Performed by: INTERNAL MEDICINE

## 2024-09-16 PROCEDURE — 85014 HEMATOCRIT: CPT | Performed by: INTERNAL MEDICINE

## 2024-09-16 PROCEDURE — 93010 ELECTROCARDIOGRAM REPORT: CPT | Performed by: INTERNAL MEDICINE

## 2024-09-16 PROCEDURE — C1732 CATH, EP, DIAG/ABL, 3D/VECT: HCPCS | Performed by: INTERNAL MEDICINE

## 2024-09-16 PROCEDURE — 999N000071 HC STATISTIC HEART CATH LAB OR EP LAB

## 2024-09-16 PROCEDURE — 36415 COLL VENOUS BLD VENIPUNCTURE: CPT | Performed by: INTERNAL MEDICINE

## 2024-09-16 PROCEDURE — 93005 ELECTROCARDIOGRAM TRACING: CPT

## 2024-09-16 PROCEDURE — C1887 CATHETER, GUIDING: HCPCS | Performed by: INTERNAL MEDICINE

## 2024-09-16 PROCEDURE — C1894 INTRO/SHEATH, NON-LASER: HCPCS | Performed by: INTERNAL MEDICINE

## 2024-09-16 PROCEDURE — 258N000003 HC RX IP 258 OP 636: Performed by: INTERNAL MEDICINE

## 2024-09-16 PROCEDURE — 272N000001 HC OR GENERAL SUPPLY STERILE: Performed by: INTERNAL MEDICINE

## 2024-09-16 PROCEDURE — 250N000009 HC RX 250: Performed by: INTERNAL MEDICINE

## 2024-09-16 PROCEDURE — C1730 CATH, EP, 19 OR FEW ELECT: HCPCS | Performed by: INTERNAL MEDICINE

## 2024-09-16 PROCEDURE — 999N000054 HC STATISTIC EKG NON-CHARGEABLE

## 2024-09-16 PROCEDURE — 80048 BASIC METABOLIC PNL TOTAL CA: CPT | Performed by: INTERNAL MEDICINE

## 2024-09-16 PROCEDURE — 93653 COMPRE EP EVAL TX SVT: CPT | Performed by: INTERNAL MEDICINE

## 2024-09-16 PROCEDURE — 99152 MOD SED SAME PHYS/QHP 5/>YRS: CPT | Performed by: INTERNAL MEDICINE

## 2024-09-16 PROCEDURE — 250N000011 HC RX IP 250 OP 636: Performed by: INTERNAL MEDICINE

## 2024-09-16 PROCEDURE — 99153 MOD SED SAME PHYS/QHP EA: CPT | Performed by: INTERNAL MEDICINE

## 2024-09-16 PROCEDURE — 93010 ELECTROCARDIOGRAM REPORT: CPT | Mod: 59 | Performed by: INTERNAL MEDICINE

## 2024-09-16 PROCEDURE — 999N000184 HC STATISTIC TELEMETRY

## 2024-09-16 RX ORDER — SODIUM CHLORIDE, SODIUM LACTATE, POTASSIUM CHLORIDE, CALCIUM CHLORIDE 600; 310; 30; 20 MG/100ML; MG/100ML; MG/100ML; MG/100ML
INJECTION, SOLUTION INTRAVENOUS CONTINUOUS
Status: DISCONTINUED | OUTPATIENT
Start: 2024-09-16 | End: 2024-09-16 | Stop reason: HOSPADM

## 2024-09-16 RX ORDER — NALOXONE HYDROCHLORIDE 0.4 MG/ML
0.4 INJECTION, SOLUTION INTRAMUSCULAR; INTRAVENOUS; SUBCUTANEOUS
Status: DISCONTINUED | OUTPATIENT
Start: 2024-09-16 | End: 2024-09-16 | Stop reason: HOSPADM

## 2024-09-16 RX ORDER — KETOROLAC TROMETHAMINE 15 MG/ML
INJECTION, SOLUTION INTRAMUSCULAR; INTRAVENOUS
Status: DISCONTINUED | OUTPATIENT
Start: 2024-09-16 | End: 2024-09-16 | Stop reason: HOSPADM

## 2024-09-16 RX ORDER — NALOXONE HYDROCHLORIDE 0.4 MG/ML
0.2 INJECTION, SOLUTION INTRAMUSCULAR; INTRAVENOUS; SUBCUTANEOUS
Status: DISCONTINUED | OUTPATIENT
Start: 2024-09-16 | End: 2024-09-16 | Stop reason: HOSPADM

## 2024-09-16 RX ORDER — LIDOCAINE 40 MG/G
CREAM TOPICAL
Status: DISCONTINUED | OUTPATIENT
Start: 2024-09-16 | End: 2024-09-16 | Stop reason: HOSPADM

## 2024-09-16 RX ORDER — FENTANYL CITRATE 50 UG/ML
INJECTION, SOLUTION INTRAMUSCULAR; INTRAVENOUS
Status: DISCONTINUED | OUTPATIENT
Start: 2024-09-16 | End: 2024-09-16 | Stop reason: HOSPADM

## 2024-09-16 RX ORDER — MIDAZOLAM HCL IN 0.9 % NACL/PF 1 MG/ML
.5-6 PLASTIC BAG, INJECTION (ML) INTRAVENOUS CONTINUOUS PRN
Status: DISCONTINUED | OUTPATIENT
Start: 2024-09-16 | End: 2024-09-16 | Stop reason: HOSPADM

## 2024-09-16 RX ORDER — ACETAMINOPHEN 325 MG/1
650 TABLET ORAL EVERY 4 HOURS PRN
Status: DISCONTINUED | OUTPATIENT
Start: 2024-09-16 | End: 2024-09-16 | Stop reason: HOSPADM

## 2024-09-16 RX ADMIN — SODIUM CHLORIDE, POTASSIUM CHLORIDE, SODIUM LACTATE AND CALCIUM CHLORIDE: 600; 310; 30; 20 INJECTION, SOLUTION INTRAVENOUS at 07:45

## 2024-09-16 ASSESSMENT — ACTIVITIES OF DAILY LIVING (ADL)
ADLS_ACUITY_SCORE: 37

## 2024-09-16 ASSESSMENT — VISUAL ACUITY: OU: GLASSES

## 2024-09-16 NOTE — PROGRESS NOTES
Dictated.    Typical counterclockwise atrial flutter.   Successful ablation.    EBL 10-15 mL.  No apparent complication.    Plan:  -bedrest for 4 hours, home later this afternoon if doing well  -continue Eliquis for at least 1 month  -plan on outpatient cardiac monitor (to be mailed in ~2 weeks to rule out paroxysmal AF)  -follow-up in the clinic after cardiac monitor results are available

## 2024-09-16 NOTE — PRE-PROCEDURE
GENERAL PRE-PROCEDURE:   Procedure:  Catheter ablation under moderate sedation  Date/Time:  9/16/2024 8:31 AM    Written consent obtained?: Yes    Risks and benefits: Risks, benefits and alternatives were discussed    Consent given by:  Patient  Patient states understanding of procedure being performed: Yes    Patient's understanding of procedure matches consent: Yes    Procedure consent matches procedure scheduled: Yes    Expected level of sedation:  Moderate  Appropriately NPO:  Yes  ASA Class:  2  Mallampati  :  Grade 2- soft palate, base of uvula, tonsillar pillars, and portion of posterior pharyngeal wall visible  Lungs:  Lungs clear with good breath sounds bilaterally  Heart:  Normal heart sounds and rate and a-flutter  History & Physical reviewed:  History and physical reviewed and no updates needed  Statement of review:  I have reviewed the lab findings, diagnostic data, medications, and the plan for sedation

## 2024-09-16 NOTE — DISCHARGE INSTRUCTIONS
A FLUTTER ABLATION DISCHARGE INSTRUCTIONS    After you go home:    Have an adult stay with you until tomorrow.  You may resume your normal diet.       For 24 hours - due to the sedation you received:  Relax and take it easy.  Do NOT make any important or legal decisions.  Do NOT drive or operate machines at home or at work.  Do NOT drink alcohol.    Care of Groin Puncture Site:    For the first 24 hrs - check the puncture site every 1-2 hours while awake.  For 2 days, when you cough, sneeze, laugh or move your bowels, hold your hand over the puncture site and press firmly.  Remove the dressing after 24 hours, works best to take off in the shower. If there is minor oozing, apply another bandaid and remove it after 12 hours.  It is normal to have bruising or pea size lump at the site.  You may shower tomorrow.  Do NOT take a bath, or use a hot tub or pool for at least 3 days. Do NOT scrub the site. Do not use lotion or powder near the puncture site.    Activity:            For 3 days:  No stooping or squatting  Do NOT do any heavy activity such as exercise, lifting, or straining.   No housework, yard work or any activity that make you sweat  Do NOT lift more than 10 pounds  Limit going up and down the stairs repetitively, for the first 24 hours after procedure.     Bleeding:    If you start bleeding from the site in your groin, lie down flat and press firmly on the site for 10 minutes.   Once bleeding stops, lay flat for 2 hours.  Call [facility] Fib nurse line as soon as you can.       Call 911 right away if you have heavy bleeding or bleeding that does not stop.      Medicines:    Take your medications, including blood thinners, unless your provider tells you not to.  If you have stopped any medicines, check with your provider about when to restart them.  If you have pain or shortness of breath, you may take Advil (ibuprofen) or Tylenol (acetaminophen).    Follow Up Appointments:    10/30/2024  with CAITY Rios at 11:30 in [place]  You will receive a phone call Tuesday, 9/17 from KATERINA Peterson to see how you are doing.    Call the clinic if:    You have increased pain or a large or growing hard lump around the site.  The site is red, swollen, hot or tender.  Blood or fluid is draining from the site.  You have chills or a fever greater than 101 F (38 C).  Your leg feels numb, cool or changes color.  Increased pain in the chest and/or groin.  Increased shortness of breath  Chest pain not relieved by Tylenol or Advil/Ibuprofen  New pain in the back or belly that you cannot control with Tylenol.  Recurrent irregular or fast heart rate (AFlutter) lasting over 2 hours.  Any questions or concerns.    Heart rhythms:    You may have some irregular heartbeats. These feel very strong. They may make you feel that the fast heart rhythm is going to start again.  Give it time. The irregular beats should occur less often.      [facility]:    [phone] ( 8am-4:30pm M-F)  Kristen Mcfarland or Loyda    [phone] Option 2  On Call Cardiologist (7 days a week)

## 2024-09-16 NOTE — PROGRESS NOTES
[facility] Discharge Nursing Note    Patient Information  Name: Juancarlos Ma  Age: 69 year old    Discharge Education:  Discharge instructions reviewed: Yes  Additional education/resources provided: none  Patient/patient representative verbalizes understanding: Yes  Patient discharging on new medications: Yes  Medication education completed: Yes    Discharge Plans:   Discharge location: home  Discharge ride contacted: Yes  Approximate discharge time: 1405    Discharge Criteria:  Discharge criteria met and vital signs stable: Yes  Pt right groin CDI with gauze and tegaderm   No bleeding no hematoma noted   CMS intact to right foot   Denies any pain   Pt been up to bathroom eating and drinking without difficulty     Patient Belongs:  Patient belongings returned to patient: Yes    Diogo Roque RN

## 2024-09-16 NOTE — PROGRESS NOTES
[facility] Admission Nursing Note    Patient Information  Name: Juancarlos Ma  Age: 69 year old  Reason for admission: Atrial Flutter Ablation   [facility] arrival time: 0630    Visitor Information  Name: Kitty      Patient Admission/Assessment   Pre-procedure assessment complete: Yes  If abnormal assessment/labs, provider notified: N/A  NPO: Yes  Medications held per instructions/orders: Yes  Consent: deferred  If applicable, pregnancy test status: deferred  Patient oriented to room: Yes  Education/questions answered: Yes  Plan/other:   Proceed with plan   Pt on Eliquis and took a dose this morning     Discharge Planning  Discharge name/phone number: Kitty  [phone]  Overnight post sedation caregiver: Kitty   Discharge location: home    Diogo Roque RN

## 2024-09-16 NOTE — PROGRESS NOTES
[facility] Post Procedure Note    Patient Information  Name: Juancarlos Ma  Age: 69 year old    Post Procedure  Time patient returned to [facility]: 0907  Concerns/abnormal assessment: none  If abnormal assessment, provider notified: N/A  Plan/Other:   Proceed to discharge home when patient meets criteria   Right groin CDI with gauze tegaderm and stopcock   No bleeding no hematoma noted  CMS intact to right foot  Denies any pain   4 hours bedrest     Diogo Roque RN

## 2024-09-17 ENCOUNTER — TELEPHONE (OUTPATIENT)
Dept: CARDIOLOGY | Facility: CLINIC | Age: 69
End: 2024-09-17
Payer: COMMERCIAL

## 2024-09-17 NOTE — TELEPHONE ENCOUNTER
Message left for patient in follow up to aflutter ablation on 9/16.  Waiting call back from patient.  KATERINA Lewis

## 2024-09-18 NOTE — TELEPHONE ENCOUNTER
Spoke to pt who is doing very well and has had not pain. States that groin area is bruised, but ok. Pt has been trying to take it easy, but did say that he watered his flower. Pt is aware of the ZP that will be coming and that he will wear for 14 days.  Pt has worn in the Past so knows how to keep it dry in the shower. Very happy with his cares in the hospital and with Dr Pugh. Sugar

## 2024-09-25 ENCOUNTER — TELEPHONE (OUTPATIENT)
Dept: CARDIOLOGY | Facility: CLINIC | Age: 69
End: 2024-09-25
Payer: COMMERCIAL

## 2024-09-25 NOTE — TELEPHONE ENCOUNTER
9/25/24 Pt called worried as he has not recd heart monitor in the mail yet. Reviewed with him that it is ordered for mail out on 9/27 and should arrive about 3 days later. Asked him to call back if it does not arrive by then  Pt voiced understanding and agreement with plan.   Casimiro 930 am

## 2024-09-27 ENCOUNTER — ORDERS ONLY (AUTO-RELEASED) (OUTPATIENT)
Dept: CARDIOLOGY | Facility: CLINIC | Age: 69
End: 2024-09-27
Payer: COMMERCIAL

## 2024-09-27 DIAGNOSIS — I48.3 TYPICAL ATRIAL FLUTTER (H): ICD-10-CM

## 2024-10-17 PROCEDURE — 93248 EXT ECG>7D<15D REV&INTERPJ: CPT | Performed by: INTERNAL MEDICINE

## 2024-10-26 NOTE — PROGRESS NOTES
"[facility]    I had the pleasure of seeing Juancarlos when he came for follow up of atrial flutter.  This 69 year old sees Dr. Pugh for his history of:    Atrial Flutter  -  discovered incidentally at PCP 2023. Underwent DCCV 10/2023, recurrent AFlutter now s/p CTI ablation 9/17/2024  HTN   Prostate Cancer  Alcohol Use  -  ~6 light beers/day      Last Visit & Interval History:  Dr. Pugh met him 9/2024 and reviewed the above. He was in AFlutter, rate controlled on CCB therapy and they discussed options for treatment given he was asx'c but remained on AC for CHADSVASc 2. Therefore, on 9/17/2024 underwent EPS with CTI ablation. Eliquis was to be continued x 1 month, and 2 week ZioPatch was to be worn ~2 weeks after ablation to assess for AFib.    ZioPatch 9/2024 showed no AFib or AFlutter.    Today's Visit:  Juancarlos feels great. Even in retrospect, doesn't think he was sx'c with AFlutter. No change in breathing, stamina, fatigue, etc since ablation.    Denies groin site issues post ablation.     No CP, SOB. No dizziness, lightheadedness.     VITALS:  Vitals: /68   Pulse 74   Ht 1.676 m (5' 6\")   Wt 88 kg (194 lb)   SpO2 95%   BMI 31.31 kg/m      Diagnostic Testing:  EKG today, which I overread, showed SR 71 bpm with normal intervals  ZioPatch 9/2024 - SR with 8 runs of SVT. No AFib or AFlutter. In SR, avg HR 62 (range  bpm). 8 episodes of SVT, longest episode 20 beats with avg  bpm. <1% PACs. <1% PVCs. Sxs correlated with SR. On Diltiazem 240 mg daily   Echocardiogram 8/2023 - LVEF 55% with nl WM. Mildly decreased RVSF. LA mod-sev dilated. JANELLE. Mild MAC with 1+MR. 1+TR. Nl aorta    Plan:  No EP follow-up required  Stop Eliquis  3.   Monitor HR daily and call if HR >100 bpm x >30 minutes or if any dizziness, lightheadedness, palpitations      Assessment/Plan:    Atrial Flutter  As above, underwent DCCV 10/2023, now s/p CTI ablation 9/17/2024  ZioPatch worn in follow-up 9/2024 showed " no AFib, AFlutter  Had been on Eliquis for CHADSVASc 2 (HTN, age).  To be continued x 1 m following ablation, then OK to stop given no arrhythmias seen on follow-up ZioPatch    PLAN:  Stop Eliquis  Reduce EtOH and marijuana use, both linked to increased risk of arrhythmias  Remains at high risk for AFib given h/o AFlutter. Asked him to monitor HR daily and call if HR >100 bpm x >30 minutes or if any dizziness, lightheadedness, palpitations  No EP follow-up required but happy to see him if any issues/concerns    HTN  Remains on olmesartan 20 and Diltiazem 240  BP today looked great    PLAN:  No changes needed      Steffany Mullen PA-C, MSPAS      Orders Placed This Encounter   Procedures    EKG 12-lead complete w/read - Clinics (performed today)     No orders of the defined types were placed in this encounter.    Medications Discontinued During This Encounter   Medication Reason    apixaban ANTICOAGULANT (ELIQUIS) 5 MG tablet Therapy completed (No AVS)         Encounter Diagnosis   Name Primary?    Typical atrial flutter (H) Yes       CURRENT MEDICATIONS:  Current Outpatient Medications   Medication Sig Dispense Refill    diltiazem ER (DILT-XR) 240 MG 24 hr ER beaded capsule Take 1 capsule (240 mg) by mouth daily 90 capsule 3    olmesartan (BENICAR) 20 MG tablet Take 1 tablet (20 mg) by mouth daily 90 tablet 3       ALLERGIES     Allergies   Allergen Reactions    Lisinopril      Cough with Lisinopril    Penicillin [Penicillins] Hives         Review of Systems:  Skin:  Negative     Eyes:  Negative    ENT:  Negative    Respiratory:  Positive for dyspnea on exertion;shortness of breath;cough  Cardiovascular:  Negative for;palpitations;chest pain;edema;fatigue;lightheadedness;dizziness    Gastroenterology: Negative for melena;hematochezia  Genitourinary:  Negative    Musculoskeletal:  Negative    Neurologic:  Negative    Psychiatric:  Negative    Heme/Lymph/Imm:  Negative    Endocrine:  Negative      Physical Exam:  Vitals:  "/68   Pulse 74   Ht 1.676 m (5' 6\")   Wt 88 kg (194 lb)   SpO2 95%   BMI 31.31 kg/m      Constitutional:  cooperative, alert and oriented, well developed, well nourished, in no acute distress        Skin:  warm and dry to the touch, no apparent skin lesions or masses noted        Head:  normocephalic, no masses or lesions        Eyes:  pupils equal and round;conjunctivae and lids unremarkable;sclera white;no xanthalasma        ENT:  no pallor or cyanosis, dentition good        Neck:  no carotid bruit;JVP normal        Chest:    prolonged expiration      Cardiac: regular rhythm                  Abdomen:  abdomen soft obese      Vascular: pulses full and equal                               right femoral bruit (-) no hematoma or tenderness of RVF access site    Extremities and Back:  no deformities, clubbing, cyanosis, erythema observed        Neurological:  no gross motor deficits            PAST MEDICAL HISTORY:  Past Medical History:   Diagnosis Date    Diverticulosis of colon 10/2/12    incidental sigmoid diverticulosis seen on routine colonoscopy, no h/o diverticulitis    Essential hypertension, benign     Fracture of fifth metacarpal bone of left hand 11/07/2017    intra-articular left 5th metacarpal base fracture with minimally dispalced left radial styloid fracture; no operative management    Prostate cancer (H) 2013    S/P partial colectomy 07/24/2019    s/p robotic assisted sigmoid colectomy for recurrent diverticulitis    Serrated adenoma of colon 10/2/12    2 sessile serrated adenomatous polyps removed at colonoscopy; 1 tubular adenoma       PAST SURGICAL HISTORY:  Past Surgical History:   Procedure Laterality Date    ANESTHESIA CARDIOVERSION N/A 10/9/2023    Procedure: cardioversion;  Surgeon: GENERIC ANESTHESIA PROVIDER;  Location:  OR    DAVINCI PROSTATECTOMY  6/10/2013    Procedure: DAVINCI PROSTATECTOMY;  ROBOTIC ASSIST PROSTATECTOMY;  Surgeon: Damon Jarrell MD;  Location:  OR    " NATALYA XI ASSISTED RESECTION RECTOSIGMOID N/A 2019    Procedure: ROBOTIC ASSISTED SIGMOID COLECTOMY, ROBOT ASSISTED SMALL BOWEL RESECTION, ROBOT ASSISTED MOBILIZATION OF THE SPLENIC FLEXURE;  Surgeon: Neel Grimes MD;  Location:  OR    EP ABLATION ATRIAL FLUTTER N/A 2024    Procedure: Ablation Atrial Flutter;  Surgeon: Mi Pugh MD;  Location:  HEART CARDIAC CATH LAB    HC TOOTH EXTRACTION W/FORCEP      4 wisdom teeth removed without complication       FAMILY HISTORY:  Family History   Problem Relation Age of Onset    Hypertension Father          of cancer     Cancer Father         father  lymphoma at age 68    Respiratory Mother          at age 72 from silicosis, COPD, smoking    Diabetes Brother         type II DM    Family History Negative Sister     Family History Negative Brother        SOCIAL HISTORY:  Social History     Socioeconomic History    Marital status: Single   Occupational History     Employer: [employer]   Tobacco Use    Smoking status: Former     Current packs/day: 0.00     Average packs/day: 0.5 packs/day for 40.0 years (20.0 ttl pk-yrs)     Types: Cigarettes     Start date: 1974     Quit date: 2014     Years since quittin.9    Smokeless tobacco: Never    Tobacco comments:     quit    Vaping Use    Vaping status: Never Used   Substance and Sexual Activity    Alcohol use: Yes     Alcohol/week: 16.7 standard drinks of alcohol     Types: 20 Cans of beer per week     Comment: daily    Drug use: Yes     Types: Marijuana    Sexual activity: Never     Social Drivers of Health     Financial Resource Strain: Low Risk  (8/15/2024)    Financial Resource Strain     Within the past 12 months, have you or your family members you live with been unable to get utilities (heat, electricity) when it was really needed?: No   Food Insecurity: Low Risk  (8/15/2024)    Food Insecurity     Within the past 12 months, did  you worry that your food would run out before you got money to buy more?: No     Within the past 12 months, did the food you bought just not last and you didn t have money to get more?: No   Transportation Needs: Low Risk  (8/15/2024)    Transportation Needs     Within the past 12 months, has lack of transportation kept you from medical appointments, getting your medicines, non-medical meetings or appointments, work, or from getting things that you need?: No   Physical Activity: Insufficiently Active (8/15/2024)    Exercise Vital Sign     Days of Exercise per Week: 3 days     Minutes of Exercise per Session: 20 min   Stress: No Stress Concern Present (8/15/2024)    [employer] - Occupational Stress Questionnaire     Feeling of Stress : Only a little   Social Connections: Unknown (8/15/2024)    Social Connection and Isolation Panel [NHANES]     Frequency of Social Gatherings with Friends and Family: More than three times a week   Interpersonal Safety: Low Risk  (9/16/2024)    Interpersonal Safety     Do you feel physically and emotionally safe where you currently live?: Yes     Within the past 12 months, have you been hit, slapped, kicked or otherwise physically hurt by someone?: No     Within the past 12 months, have you been humiliated or emotionally abused in other ways by your partner or ex-partner?: No   Housing Stability: Low Risk  (8/15/2024)    Housing Stability     Do you have housing? : Yes     Are you worried about losing your housing?: No

## 2024-10-30 ENCOUNTER — OFFICE VISIT (OUTPATIENT)
Dept: CARDIOLOGY | Facility: CLINIC | Age: 69
End: 2024-10-30
Payer: COMMERCIAL

## 2024-10-30 VITALS
DIASTOLIC BLOOD PRESSURE: 68 MMHG | HEIGHT: 66 IN | HEART RATE: 74 BPM | BODY MASS INDEX: 31.18 KG/M2 | WEIGHT: 194 LBS | OXYGEN SATURATION: 95 % | SYSTOLIC BLOOD PRESSURE: 124 MMHG

## 2024-10-30 DIAGNOSIS — I48.3 TYPICAL ATRIAL FLUTTER (H): Primary | ICD-10-CM

## 2024-10-30 PROCEDURE — 93000 ELECTROCARDIOGRAM COMPLETE: CPT | Performed by: PHYSICIAN ASSISTANT

## 2024-10-30 PROCEDURE — 99214 OFFICE O/P EST MOD 30 MIN: CPT | Performed by: PHYSICIAN ASSISTANT

## 2024-10-30 NOTE — PATIENT INSTRUCTIONS
Juancarlos - it was nice to see you  today!    Today we reviewed:    EKG today looked great!  Reviewed AFlutter ablation done 10/17 - this went well  Follow-up monitor showed NO AFib or recurrent AFlutter    MEDICATION CHANGES:    Ok to STOP Eliquis    RECOMMENDATIONS:    Check HR every day (pulse ox) and CALL if HR is >100 bpm for >30 minutes at rest ... May be a sign you're in AFib and will need to start anticoagulation    FOLLOW UP:    No EP follow-up required but CALL if palpitations/dizziness, lightheadedness or HR >100 bpm x >30 minutes    IMPORTANT PHONE NUMBERS FOR  [facility] [place] [facility] ([place]):  Arrhythmia nurses Bruna, Kristen, & Loyda: [phone]

## 2024-10-30 NOTE — LETTER
"10/30/2024    Wili Arevalo MD  [address] [facility] [address]    RE: Juancarlos CATALINA Ma       Dear Colleague,     I had the pleasure of seeing Juancarlos Ma in the [facility].  [facility]    I had the pleasure of seeing Juancarlos when he came for follow up of atrial flutter.  This 69 year old sees Dr. Pugh for his history of:    Atrial Flutter  -  discovered incidentally at PCP 2023. Underwent DCCV 10/2023, recurrent AFlutter now s/p CTI ablation 9/17/2024  HTN   Prostate Cancer  Alcohol Use  -  ~6 light beers/day      Last Visit & Interval History:  Dr. Pugh met him 9/2024 and reviewed the above. He was in AFlutter, rate controlled on CCB therapy and they discussed options for treatment given he was asx'c but remained on AC for CHADSVASc 2. Therefore, on 9/17/2024 underwent EPS with CTI ablation. Eliquis was to be continued x 1 month, and 2 week ZioPatch was to be worn ~2 weeks after ablation to assess for AFib.    ZioPatch 9/2024 showed no AFib or AFlutter.    Today's Visit:  Juancarlos feels great. Even in retrospect, doesn't think he was sx'c with AFlutter. No change in breathing, stamina, fatigue, etc since ablation.    Denies groin site issues post ablation.     No CP, SOB. No dizziness, lightheadedness.     VITALS:  Vitals: /68   Pulse 74   Ht 1.676 m (5' 6\")   Wt 88 kg (194 lb)   SpO2 95%   BMI 31.31 kg/m      Diagnostic Testing:  EKG today, which I overread, showed SR 71 bpm with normal intervals  ZioPatch 9/2024 - SR with 8 runs of SVT. No AFib or AFlutter. In SR, avg HR 62 (range  bpm). 8 episodes of SVT, longest episode 20 beats with avg  bpm. <1% PACs. <1% PVCs. Sxs correlated with SR. On Diltiazem 240 mg daily   Echocardiogram 8/2023 - LVEF 55% with nl WM. Mildly decreased RVSF. LA mod-sev dilated. JANELLE. Mild MAC with 1+MR. 1+TR. Nl aorta    Plan:  No EP follow-up required  Stop Eliquis  3.   Monitor HR daily and call if HR >100 bpm x >30 " minutes or if any dizziness, lightheadedness, palpitations      Assessment/Plan:    Atrial Flutter  As above, underwent DCCV 10/2023, now s/p CTI ablation 9/17/2024  ZioPatch worn in follow-up 9/2024 showed no AFib, AFlutter  Had been on Eliquis for CHADSVASc 2 (HTN, age).  To be continued x 1 m following ablation, then OK to stop given no arrhythmias seen on follow-up ZioPatch    PLAN:  Stop Eliquis  Reduce EtOH and marijuana use, both linked to increased risk of arrhythmias  Remains at high risk for AFib given h/o AFlutter. Asked him to monitor HR daily and call if HR >100 bpm x >30 minutes or if any dizziness, lightheadedness, palpitations  No EP follow-up required but happy to see him if any issues/concerns    HTN  Remains on olmesartan 20 and Diltiazem 240  BP today looked great    PLAN:  No changes needed      Steffany Mullen PA-C, MSPAS      Orders Placed This Encounter   Procedures     EKG 12-lead complete w/read - Clinics (performed today)     No orders of the defined types were placed in this encounter.    Medications Discontinued During This Encounter   Medication Reason     apixaban ANTICOAGULANT (ELIQUIS) 5 MG tablet Therapy completed (No AVS)         Encounter Diagnosis   Name Primary?     Typical atrial flutter (H) Yes       CURRENT MEDICATIONS:  Current Outpatient Medications   Medication Sig Dispense Refill     diltiazem ER (DILT-XR) 240 MG 24 hr ER beaded capsule Take 1 capsule (240 mg) by mouth daily 90 capsule 3     olmesartan (BENICAR) 20 MG tablet Take 1 tablet (20 mg) by mouth daily 90 tablet 3       ALLERGIES     Allergies   Allergen Reactions     Lisinopril      Cough with Lisinopril     Penicillin [Penicillins] Hives         Review of Systems:  Skin:  Negative     Eyes:  Negative    ENT:  Negative    Respiratory:  Positive for dyspnea on exertion;shortness of breath;cough  Cardiovascular:  Negative for;palpitations;chest pain;edema;fatigue;lightheadedness;dizziness    Gastroenterology: Negative  "for melena;hematochezia  Genitourinary:  Negative    Musculoskeletal:  Negative    Neurologic:  Negative    Psychiatric:  Negative    Heme/Lymph/Imm:  Negative    Endocrine:  Negative      Physical Exam:  Vitals: /68   Pulse 74   Ht 1.676 m (5' 6\")   Wt 88 kg (194 lb)   SpO2 95%   BMI 31.31 kg/m      Constitutional:  cooperative, alert and oriented, well developed, well nourished, in no acute distress        Skin:  warm and dry to the touch, no apparent skin lesions or masses noted        Head:  normocephalic, no masses or lesions        Eyes:  pupils equal and round;conjunctivae and lids unremarkable;sclera white;no xanthalasma        ENT:  no pallor or cyanosis, dentition good        Neck:  no carotid bruit;JVP normal        Chest:    prolonged expiration      Cardiac: regular rhythm                  Abdomen:  abdomen soft obese      Vascular: pulses full and equal                               right femoral bruit (-) no hematoma or tenderness of RVF access site    Extremities and Back:  no deformities, clubbing, cyanosis, erythema observed        Neurological:  no gross motor deficits            PAST MEDICAL HISTORY:  Past Medical History:   Diagnosis Date     Diverticulosis of colon 10/2/12    incidental sigmoid diverticulosis seen on routine colonoscopy, no h/o diverticulitis     Essential hypertension, benign      Fracture of fifth metacarpal bone of left hand 11/07/2017    intra-articular left 5th metacarpal base fracture with minimally dispalced left radial styloid fracture; no operative management     Prostate cancer (H) 2013     S/P partial colectomy 07/24/2019    s/p robotic assisted sigmoid colectomy for recurrent diverticulitis     Serrated adenoma of colon 10/2/12    2 sessile serrated adenomatous polyps removed at colonoscopy; 1 tubular adenoma       PAST SURGICAL HISTORY:  Past Surgical History:   Procedure Laterality Date     ANESTHESIA CARDIOVERSION N/A 10/9/2023    Procedure: " cardioversion;  Surgeon: GENERIC ANESTHESIA PROVIDER;  Location:  OR     DAVINCI PROSTATECTOMY  6/10/2013    Procedure: DAVINCI PROSTATECTOMY;  ROBOTIC ASSIST PROSTATECTOMY;  Surgeon: Damon Jarrell MD;  Location:  OR     DAVINCI XI ASSISTED RESECTION RECTOSIGMOID N/A 2019    Procedure: ROBOTIC ASSISTED SIGMOID COLECTOMY, ROBOT ASSISTED SMALL BOWEL RESECTION, ROBOT ASSISTED MOBILIZATION OF THE SPLENIC FLEXURE;  Surgeon: Neel Grimes MD;  Location:  OR     EP ABLATION ATRIAL FLUTTER N/A 2024    Procedure: Ablation Atrial Flutter;  Surgeon: Mi Pugh MD;  Location:  HEART CARDIAC CATH LAB     HC TOOTH EXTRACTION W/FORCE1974    4 wisdom teeth removed without complication       FAMILY HISTORY:  Family History   Problem Relation Age of Onset     Hypertension Father          of cancer      Cancer Father         father  lymphoma at age 68     Respiratory Mother          at age 72 from silicosis, COPD, smoking     Diabetes Brother         type II DM     Family History Negative Sister      Family History Negative Brother        SOCIAL HISTORY:  Social History     Socioeconomic History     Marital status: Single   Occupational History     Employer: [employer]   Tobacco Use     Smoking status: Former     Current packs/day: 0.00     Average packs/day: 0.5 packs/day for 40.0 years (20.0 ttl pk-yrs)     Types: Cigarettes     Start date: 1974     Quit date: 2014     Years since quittin.9     Smokeless tobacco: Never     Tobacco comments:     quit    Vaping Use     Vaping status: Never Used   Substance and Sexual Activity     Alcohol use: Yes     Alcohol/week: 16.7 standard drinks of alcohol     Types: 20 Cans of beer per week     Comment: daily     Drug use: Yes     Types: Marijuana     Sexual activity: Never     Social Drivers of Health     Financial Resource Strain: Low Risk  (8/15/2024)    Financial Resource Strain       Within the past 12 months, have you or your family members you live with been unable to get utilities (heat, electricity) when it was really needed?: No   Food Insecurity: Low Risk  (8/15/2024)    Food Insecurity      Within the past 12 months, did you worry that your food would run out before you got money to buy more?: No      Within the past 12 months, did the food you bought just not last and you didn t have money to get more?: No   Transportation Needs: Low Risk  (8/15/2024)    Transportation Needs      Within the past 12 months, has lack of transportation kept you from medical appointments, getting your medicines, non-medical meetings or appointments, work, or from getting things that you need?: No   Physical Activity: Insufficiently Active (8/15/2024)    Exercise Vital Sign      Days of Exercise per Week: 3 days      Minutes of Exercise per Session: 20 min   Stress: No Stress Concern Present (8/15/2024)    [employer] [place] - Occupational Stress Questionnaire      Feeling of Stress : Only a little   Social Connections: Unknown (8/15/2024)    Social Connection and Isolation Panel [NHANES]      Frequency of Social Gatherings with Friends and Family: More than three times a week   Interpersonal Safety: Low Risk  (9/16/2024)    Interpersonal Safety      Do you feel physically and emotionally safe where you currently live?: Yes      Within the past 12 months, have you been hit, slapped, kicked or otherwise physically hurt by someone?: No      Within the past 12 months, have you been humiliated or emotionally abused in other ways by your partner or ex-partner?: No   Housing Stability: Low Risk  (8/15/2024)    Housing Stability      Do you have housing? : Yes      Are you worried about losing your housing?: No           Thank you for allowing me to participate in the care of your patient.      Sincerely,     ABEL Venegas [facility]  [facility]  cc:   Wili Arevalo MD  [address] [place] [id number]

## 2025-04-14 ENCOUNTER — APPOINTMENT (OUTPATIENT)
Dept: URBAN - METROPOLITAN AREA CLINIC 256 | Age: 70
Setting detail: DERMATOLOGY
End: 2025-04-14

## 2025-04-14 VITALS — HEIGHT: 66 IN | WEIGHT: 185 LBS

## 2025-04-14 DIAGNOSIS — Z71.89 OTHER SPECIFIED COUNSELING: ICD-10-CM

## 2025-04-14 DIAGNOSIS — D22 MELANOCYTIC NEVI: ICD-10-CM

## 2025-04-14 DIAGNOSIS — L57.8 OTHER SKIN CHANGES DUE TO CHRONIC EXPOSURE TO NONIONIZING RADIATION: ICD-10-CM

## 2025-04-14 DIAGNOSIS — L29.89 OTHER PRURITUS: ICD-10-CM

## 2025-04-14 DIAGNOSIS — L82.0 INFLAMED SEBORRHEIC KERATOSIS: ICD-10-CM

## 2025-04-14 DIAGNOSIS — L82.1 OTHER SEBORRHEIC KERATOSIS: ICD-10-CM

## 2025-04-14 DIAGNOSIS — D18.0 HEMANGIOMA: ICD-10-CM

## 2025-04-14 PROBLEM — D18.01 HEMANGIOMA OF SKIN AND SUBCUTANEOUS TISSUE: Status: ACTIVE | Noted: 2025-04-14

## 2025-04-14 PROBLEM — D22.39 MELANOCYTIC NEVI OF OTHER PARTS OF FACE: Status: ACTIVE | Noted: 2025-04-14

## 2025-04-14 PROBLEM — D22.61 MELANOCYTIC NEVI OF RIGHT UPPER LIMB, INCLUDING SHOULDER: Status: ACTIVE | Noted: 2025-04-14

## 2025-04-14 PROBLEM — D22.62 MELANOCYTIC NEVI OF LEFT UPPER LIMB, INCLUDING SHOULDER: Status: ACTIVE | Noted: 2025-04-14

## 2025-04-14 PROBLEM — D22.5 MELANOCYTIC NEVI OF TRUNK: Status: ACTIVE | Noted: 2025-04-14

## 2025-04-14 PROCEDURE — OTHER MIPS QUALITY: OTHER

## 2025-04-14 PROCEDURE — 17110 DESTRUCT B9 LESION 1-14: CPT

## 2025-04-14 PROCEDURE — OTHER LIQUID NITROGEN: OTHER

## 2025-04-14 PROCEDURE — 99213 OFFICE O/P EST LOW 20 MIN: CPT | Mod: 25

## 2025-04-14 PROCEDURE — OTHER PATIENT SPECIFIC COUNSELING: OTHER

## 2025-04-14 PROCEDURE — OTHER COUNSELING: OTHER

## 2025-04-14 ASSESSMENT — LOCATION DETAILED DESCRIPTION DERM
LOCATION DETAILED: LEFT VENTRAL PROXIMAL FOREARM
LOCATION DETAILED: LEFT ANTERIOR SHOULDER
LOCATION DETAILED: RIGHT SUPERIOR UPPER BACK
LOCATION DETAILED: LEFT SUPERIOR ANTERIOR NECK
LOCATION DETAILED: SUPERIOR THORACIC SPINE
LOCATION DETAILED: RIGHT DISTAL DORSAL FOREARM
LOCATION DETAILED: RIGHT ANTERIOR SHOULDER
LOCATION DETAILED: LEFT POSTERIOR SHOULDER
LOCATION DETAILED: RIGHT VENTRAL PROXIMAL FOREARM
LOCATION DETAILED: LEFT INFERIOR CENTRAL MALAR CHEEK
LOCATION DETAILED: LEFT CENTRAL MALAR CHEEK
LOCATION DETAILED: LEFT INFERIOR LATERAL MALAR CHEEK
LOCATION DETAILED: LEFT SUPERIOR MEDIAL UPPER BACK
LOCATION DETAILED: LEFT ANTECUBITAL SKIN

## 2025-04-14 ASSESSMENT — LOCATION SIMPLE DESCRIPTION DERM
LOCATION SIMPLE: LEFT UPPER ARM
LOCATION SIMPLE: LEFT SHOULDER
LOCATION SIMPLE: LEFT ANTERIOR NECK
LOCATION SIMPLE: LEFT UPPER BACK
LOCATION SIMPLE: UPPER BACK
LOCATION SIMPLE: RIGHT SHOULDER
LOCATION SIMPLE: LEFT CHEEK
LOCATION SIMPLE: RIGHT UPPER BACK
LOCATION SIMPLE: RIGHT FOREARM
LOCATION SIMPLE: LEFT FOREARM

## 2025-04-14 ASSESSMENT — LOCATION ZONE DERM
LOCATION ZONE: FACE
LOCATION ZONE: NECK
LOCATION ZONE: ARM
LOCATION ZONE: TRUNK

## 2025-04-14 NOTE — HPI: ITCHING
Pt called, needs a referral for a Urologist.  Please call pt at [phone]
What Type Of Note Output Would You Prefer (Optional)?: Standard Output
On A Scale Of 0 To 10 How Would You Rate Your Itching?: 5
How Did Your Itching Occur?: sudden in onset (over a period of weeks to a few months)

## 2025-04-14 NOTE — PROCEDURE: PATIENT SPECIFIC COUNSELING
Discussed no eczema present at this time indicates a nerve itch. Assured no treatment is necessary.
Detail Level: Zone

## 2025-04-14 NOTE — PROCEDURE: LIQUID NITROGEN
Detail Level: Simple
Add 52 Modifier (Optional): no
Application Tool (Optional): Liquid Nitrogen Sprayer
Show Topical Anesthesia Variable?: Yes
Consent: The patient's verbal consent was obtained including but not limited to risks of crusting, scabbing, blistering, scarring, darker or lighter pigmentary change, recurrence, incomplete removal and infection.
Post-Care Instructions: I reviewed with the patient in detail post-care instructions. Patient is to wear sunprotection, and avoid picking at any of the treated lesions. Pt may apply Vaseline to crusted or scabbing areas.
Medical Necessity Information: It is in your best interest to select a reason for this procedure from the list below. All of these items fulfill various CMS LCD requirements except the new and changing color options.
Duration Of Freeze Thaw-Cycle (Seconds): 5-10
Medical Necessity Clause: This procedure was medically necessary because the lesions that were treated were:
Spray Paint Text: The liquid nitrogen was applied to the skin utilizing a spray paint frosting technique.
Number Of Freeze-Thaw Cycles: 1 freeze-thaw cycle

## 2025-07-16 ENCOUNTER — PATIENT OUTREACH (OUTPATIENT)
Dept: CARE COORDINATION | Facility: CLINIC | Age: 70
End: 2025-07-16
Payer: COMMERCIAL

## 2025-07-30 ENCOUNTER — PATIENT OUTREACH (OUTPATIENT)
Dept: CARE COORDINATION | Facility: CLINIC | Age: 70
End: 2025-07-30
Payer: COMMERCIAL

## (undated) DEVICE — GLOVE PROTEXIS W/NEU-THERA 7.5  2D73TE75

## (undated) DEVICE — DAVINCI XI DRAPE ARM 470015

## (undated) DEVICE — GOWN IMPERVIOUS SPECIALTY XL/XLONG 39049

## (undated) DEVICE — DAVINCI XI OBTURATOR BLADELESS 8MM 470359

## (undated) DEVICE — DAVINCI XI SEAL UNIVERSAL 5-8MM 470361

## (undated) DEVICE — Device

## (undated) DEVICE — CATH EP 6FR 2MM TIP 2-8-2 115C

## (undated) DEVICE — DAVINCI HOT SHEARS TIP COVER  400180

## (undated) DEVICE — SU STRATAFIX MONOCRYL 2-0 SPIRAL SH 20CM SXMP1B409

## (undated) DEVICE — DEFIB PRO-PADZ LVP LQD GEL ADULT 8900-2105-01

## (undated) DEVICE — LINEN TOWEL PACK X5 5464

## (undated) DEVICE — DAVINCI XI DRAPE COLUMN 470341

## (undated) DEVICE — CATH TRAY FOLEY COUDE SURESTEP 16FR W/URNE MTR STLK A304716A

## (undated) DEVICE — CATH BLAZER DX-20 DUO-DECAPOLA

## (undated) DEVICE — ENDO TROCAR CONMED AIRSEAL BLADELESS 08X120MM IAS8-120LP

## (undated) DEVICE — NDL INSUFFLATION 13GA 120MM C2201

## (undated) DEVICE — TAPE CLOTH ADHESIVE 3" LATEX FREE

## (undated) DEVICE — DAVINCI XI GRASPER FENESTRATED TIP UP 8MM 470347

## (undated) DEVICE — TUBE SET SMARKABLATE IRRIGATION

## (undated) DEVICE — DRAIN JACKSON PRATT CHANNEL 19FR ROUND HUBLESS SIL JP-2230

## (undated) DEVICE — DRAIN BLAKE 19FR ROUND SIL HUBLESS 2230

## (undated) DEVICE — CATH THERMOCOOL SMARTTOUCH SF FJ CURVE

## (undated) DEVICE — ESU HOLDER LAP INST DISP PURPLE LONG 330MM H-PRO-330

## (undated) DEVICE — DAVINCI ENDOWRIST STAPLER 45 SHEATH 410370

## (undated) DEVICE — SUCTION CANISTER MEDIVAC LINER 3000ML W/LID 65651-530

## (undated) DEVICE — ESU GROUND PAD UNIVERSAL W/O CORD

## (undated) DEVICE — DAVINCI XI NDL DRIVER LARGE 470006

## (undated) DEVICE — SU VICRYL 3-0 SH CR 8X18" J774

## (undated) DEVICE — TUBING CONMED AIRSEAL SMOKE EVAC INSUFFLATION ASM-EVAC

## (undated) DEVICE — SU MONOCRYL 4-0 PS-2 18" UND Y496G

## (undated) DEVICE — DRSG TEGADERM 2 1/2X 2 3/4"

## (undated) DEVICE — SOL WATER IRRIG 1000ML BOTTLE 2F7114

## (undated) DEVICE — SU VICRYL 3-0 SH 27" UND J416H

## (undated) DEVICE — DAVINCI SEAL CANNULA AND STPL 12MM 470380

## (undated) DEVICE — PATCH CARTO 3 EXTERNAL REFERENCE 3D MAPPING CREFP6

## (undated) DEVICE — DAVINCI XI MONOPOLAR SCISSORS HOT SHEARS 8MM 470179

## (undated) DEVICE — STPL CIRCULAR DST 28MM W/TILT TIP EEA28

## (undated) DEVICE — DAVINCI XI REDUCER 8-12MM 470381

## (undated) DEVICE — PACK EP SRG PROC LF DISP SAN32EPFSR

## (undated) DEVICE — TUBING SUCTION 12"X1/4" N612

## (undated) DEVICE — DRAPE BREAST/CHEST 29420

## (undated) DEVICE — GLOVE PROTEXIS MICRO 6.5  2D73PM65

## (undated) DEVICE — INTRO SHEATH 7FRX10CM PINNACLE RSS702

## (undated) DEVICE — SYR BULB IRRIG 50ML LATEX FREE 0035280

## (undated) DEVICE — SUREFORM 60 RELOAD

## (undated) DEVICE — RAD INTRODUCER KIT MICRO 5FRX10CM .018 NITINOL G/W

## (undated) DEVICE — SUCTION IRR STRYKERFLOW II W/TIP 250-070-520

## (undated) DEVICE — GLOVE PROTEXIS BLUE W/NEU-THERA 6.5  2D73EB65

## (undated) DEVICE — INTRO SHEALTH 8.5FRX63CM SRO 406853

## (undated) DEVICE — DAVINCI XI HANDPIECE ESU VESSEL SEALER 8MM EXT 480422

## (undated) DEVICE — SPONGE RAY-TEC 4X8" 7318

## (undated) DEVICE — SU PDS II 0 CTX 60" Z990G

## (undated) DEVICE — DEVICE SUTURE GRASPER TROCAR CLOSURE 14GA PMITCSG

## (undated) DEVICE — PACK DAVINCI UROLOGY SBA15UDFSG

## (undated) DEVICE — KIT PATIENT POSITIONING PIGAZZI LATEX FREE 40580

## (undated) DEVICE — INTRODUCER SHEATH GREEN 6.5FRX11CM .038IN PSI-6F-11-038ACT

## (undated) DEVICE — SOL NACL 0.9% INJ 1000ML BAG 2B1324X

## (undated) DEVICE — DRAPE POUCH IRR 1016

## (undated) DEVICE — DRAIN PENROSE 0.50"X18" LATEX FREE GR203

## (undated) DEVICE — SU VICRYL 0 TIE 12X18" J906G

## (undated) DEVICE — SOL NACL 0.9% IRRIG 1000ML BOTTLE 2F7124

## (undated) DEVICE — SU VICRYL 3-0 TIE 12X18" J904T

## (undated) DEVICE — SU PROLENE 2-0 SHDA 48" 8533H

## (undated) DEVICE — GLOVE PROTEXIS BLUE W/NEU-THERA 7.5  2D73EB75

## (undated) RX ORDER — KETOROLAC TROMETHAMINE 30 MG/ML
INJECTION, SOLUTION INTRAMUSCULAR; INTRAVENOUS
Status: DISPENSED
Start: 2024-09-16

## (undated) RX ORDER — ALBUMIN, HUMAN INJ 5% 5 %
SOLUTION INTRAVENOUS
Status: DISPENSED
Start: 2019-07-24

## (undated) RX ORDER — DEXAMETHASONE SODIUM PHOSPHATE 4 MG/ML
INJECTION, SOLUTION INTRA-ARTICULAR; INTRALESIONAL; INTRAMUSCULAR; INTRAVENOUS; SOFT TISSUE
Status: DISPENSED
Start: 2019-07-24

## (undated) RX ORDER — LIDOCAINE HYDROCHLORIDE 20 MG/ML
INJECTION, SOLUTION EPIDURAL; INFILTRATION; INTRACAUDAL; PERINEURAL
Status: DISPENSED
Start: 2019-07-24

## (undated) RX ORDER — ONDANSETRON 2 MG/ML
INJECTION INTRAMUSCULAR; INTRAVENOUS
Status: DISPENSED
Start: 2019-07-24

## (undated) RX ORDER — ALBUTEROL SULFATE 90 UG/1
AEROSOL, METERED RESPIRATORY (INHALATION)
Status: DISPENSED
Start: 2019-07-24

## (undated) RX ORDER — VECURONIUM BROMIDE 1 MG/ML
INJECTION, POWDER, LYOPHILIZED, FOR SOLUTION INTRAVENOUS
Status: DISPENSED
Start: 2019-07-24

## (undated) RX ORDER — PROPOFOL 10 MG/ML
INJECTION, EMULSION INTRAVENOUS
Status: DISPENSED
Start: 2019-07-24

## (undated) RX ORDER — HEPARIN SODIUM 200 [USP'U]/100ML
INJECTION, SOLUTION INTRAVENOUS
Status: DISPENSED
Start: 2024-09-16

## (undated) RX ORDER — FENTANYL CITRATE 50 UG/ML
INJECTION, SOLUTION INTRAMUSCULAR; INTRAVENOUS
Status: DISPENSED
Start: 2024-09-16

## (undated) RX ORDER — HEPARIN SODIUM 1000 [USP'U]/ML
INJECTION, SOLUTION INTRAVENOUS; SUBCUTANEOUS
Status: DISPENSED
Start: 2024-09-16

## (undated) RX ORDER — GLYCOPYRROLATE 0.2 MG/ML
INJECTION, SOLUTION INTRAMUSCULAR; INTRAVENOUS
Status: DISPENSED
Start: 2019-07-24

## (undated) RX ORDER — FENTANYL CITRATE 50 UG/ML
INJECTION, SOLUTION INTRAMUSCULAR; INTRAVENOUS
Status: DISPENSED
Start: 2019-07-24

## (undated) RX ORDER — CIPROFLOXACIN 2 MG/ML
INJECTION, SOLUTION INTRAVENOUS
Status: DISPENSED
Start: 2019-07-24

## (undated) RX ORDER — HEPARIN SODIUM 5000 [USP'U]/.5ML
INJECTION, SOLUTION INTRAVENOUS; SUBCUTANEOUS
Status: DISPENSED
Start: 2019-07-24

## (undated) RX ORDER — BUPIVACAINE HYDROCHLORIDE 5 MG/ML
INJECTION, SOLUTION EPIDURAL; INTRACAUDAL
Status: DISPENSED
Start: 2019-07-24

## (undated) RX ORDER — INDOCYANINE GREEN AND WATER 25 MG
KIT INJECTION
Status: DISPENSED
Start: 2019-07-24

## (undated) RX ORDER — HYDROMORPHONE HYDROCHLORIDE 1 MG/ML
INJECTION, SOLUTION INTRAMUSCULAR; INTRAVENOUS; SUBCUTANEOUS
Status: DISPENSED
Start: 2019-07-24

## (undated) RX ORDER — ACETAMINOPHEN 325 MG/1
TABLET ORAL
Status: DISPENSED
Start: 2019-07-24

## (undated) RX ORDER — ALVIMOPAN 12 MG/1
CAPSULE ORAL
Status: DISPENSED
Start: 2019-07-24

## (undated) RX ORDER — NEOSTIGMINE METHYLSULFATE 1 MG/ML
VIAL (ML) INJECTION
Status: DISPENSED
Start: 2019-07-24

## (undated) RX ORDER — CELECOXIB 200 MG/1
CAPSULE ORAL
Status: DISPENSED
Start: 2019-07-24